# Patient Record
Sex: FEMALE | Race: WHITE | Employment: OTHER | URBAN - METROPOLITAN AREA
[De-identification: names, ages, dates, MRNs, and addresses within clinical notes are randomized per-mention and may not be internally consistent; named-entity substitution may affect disease eponyms.]

---

## 2017-02-10 ENCOUNTER — TELEPHONE (OUTPATIENT)
Dept: INTERNAL MEDICINE | Age: 62
End: 2017-02-10

## 2017-02-10 RX ORDER — CYANOCOBALAMIN 1000 UG/ML
INJECTION INTRAMUSCULAR; SUBCUTANEOUS
Qty: 10 ML | Refills: 0 | Status: SHIPPED | OUTPATIENT
Start: 2017-02-10 | End: 2017-12-02 | Stop reason: SDUPTHER

## 2017-02-21 ENCOUNTER — TELEPHONE (OUTPATIENT)
Dept: INTERNAL MEDICINE | Age: 62
End: 2017-02-21

## 2017-02-22 ENCOUNTER — OFFICE VISIT (OUTPATIENT)
Dept: INTERNAL MEDICINE | Age: 62
End: 2017-02-22

## 2017-02-22 VITALS
DIASTOLIC BLOOD PRESSURE: 82 MMHG | SYSTOLIC BLOOD PRESSURE: 138 MMHG | HEIGHT: 62 IN | BODY MASS INDEX: 25.21 KG/M2 | WEIGHT: 137 LBS

## 2017-02-22 DIAGNOSIS — T14.8XXA PULLED MUSCLE: Primary | ICD-10-CM

## 2017-02-22 DIAGNOSIS — E53.8 B12 DEFICIENCY: ICD-10-CM

## 2017-02-22 DIAGNOSIS — Z23 NEED FOR SHINGLES VACCINE: ICD-10-CM

## 2017-02-22 DIAGNOSIS — Z90.81 H/O SPLENECTOMY: ICD-10-CM

## 2017-02-22 PROCEDURE — 90736 HZV VACCINE LIVE SUBQ: CPT | Performed by: INTERNAL MEDICINE

## 2017-02-22 PROCEDURE — 90471 IMMUNIZATION ADMIN: CPT | Performed by: INTERNAL MEDICINE

## 2017-02-22 PROCEDURE — 99213 OFFICE O/P EST LOW 20 MIN: CPT | Performed by: INTERNAL MEDICINE

## 2017-02-22 RX ORDER — DICLOFENAC SODIUM 75 MG/1
75 TABLET, DELAYED RELEASE ORAL 2 TIMES DAILY
Qty: 30 TABLET | Refills: 1 | Status: SHIPPED | OUTPATIENT
Start: 2017-02-22 | End: 2017-05-26 | Stop reason: CLARIF

## 2017-05-17 ENCOUNTER — TELEPHONE (OUTPATIENT)
Dept: INTERNAL MEDICINE | Age: 62
End: 2017-05-17

## 2017-05-17 DIAGNOSIS — E53.8 B12 DEFICIENCY: ICD-10-CM

## 2017-05-17 DIAGNOSIS — Z00.00 WELL ADULT ON ROUTINE HEALTH CHECK: Primary | ICD-10-CM

## 2017-05-17 DIAGNOSIS — E55.9 VITAMIN D DEFICIENCY: ICD-10-CM

## 2017-05-26 ENCOUNTER — HOSPITAL ENCOUNTER (OUTPATIENT)
Dept: OTHER | Age: 62
Discharge: HOME | End: 2017-05-26
Attending: INTERNAL MEDICINE | Admitting: INTERNAL MEDICINE

## 2017-05-26 ENCOUNTER — OFFICE VISIT (OUTPATIENT)
Dept: INTERNAL MEDICINE | Age: 62
End: 2017-05-26

## 2017-05-26 VITALS
HEIGHT: 62 IN | DIASTOLIC BLOOD PRESSURE: 80 MMHG | BODY MASS INDEX: 24.29 KG/M2 | WEIGHT: 132 LBS | SYSTOLIC BLOOD PRESSURE: 110 MMHG

## 2017-05-26 DIAGNOSIS — R63.4 WEIGHT LOSS: ICD-10-CM

## 2017-05-26 DIAGNOSIS — Z00.00 WELL ADULT EXAM: ICD-10-CM

## 2017-05-26 DIAGNOSIS — F17.200 SMOKING: ICD-10-CM

## 2017-05-26 DIAGNOSIS — E53.8 B12 DEFICIENCY: ICD-10-CM

## 2017-05-26 DIAGNOSIS — Z90.81 H/O SPLENECTOMY: ICD-10-CM

## 2017-05-26 DIAGNOSIS — I10 ESSENTIAL HYPERTENSION: ICD-10-CM

## 2017-05-26 DIAGNOSIS — Z00.00 WELL ADULT EXAM: Primary | ICD-10-CM

## 2017-05-26 DIAGNOSIS — E55.9 VITAMIN D DEFICIENCY: ICD-10-CM

## 2017-05-26 LAB
A/G RATIO: 1.2 (ref 1.1–2.2)
ALBUMIN SERPL-MCNC: 4.2 G/DL (ref 3.4–5)
ALP BLD-CCNC: 163 U/L (ref 40–129)
ALT SERPL-CCNC: 9 U/L (ref 10–40)
ANION GAP SERPL CALCULATED.3IONS-SCNC: 15 MMOL/L (ref 3–16)
AST SERPL-CCNC: 16 U/L (ref 15–37)
BASOPHILS ABSOLUTE: 0.1 K/UL (ref 0–0.2)
BASOPHILS RELATIVE PERCENT: 0.9 %
BILIRUB SERPL-MCNC: 0.4 MG/DL (ref 0–1)
BILIRUBIN URINE: NEGATIVE
BLOOD, URINE: NEGATIVE
BUN BLDV-MCNC: 14 MG/DL (ref 7–20)
CALCIUM SERPL-MCNC: 9.7 MG/DL (ref 8.3–10.6)
CHLORIDE BLD-SCNC: 102 MMOL/L (ref 99–110)
CHOLESTEROL, TOTAL: 190 MG/DL (ref 0–199)
CLARITY: CLEAR
CO2: 25 MMOL/L (ref 21–32)
COLOR: YELLOW
CREAT SERPL-MCNC: 0.8 MG/DL (ref 0.6–1.2)
EOSINOPHILS ABSOLUTE: 0.3 K/UL (ref 0–0.6)
EOSINOPHILS RELATIVE PERCENT: 2.2 %
EPITHELIAL CELLS, UA: 2 /HPF (ref 0–5)
GFR AFRICAN AMERICAN: >60
GFR NON-AFRICAN AMERICAN: >60
GLOBULIN: 3.5 G/DL
GLUCOSE BLD-MCNC: 97 MG/DL (ref 70–99)
GLUCOSE URINE: NEGATIVE MG/DL
HCT VFR BLD CALC: 39.6 % (ref 36–48)
HDLC SERPL-MCNC: 45 MG/DL (ref 40–60)
HEMOGLOBIN: 12.4 G/DL (ref 12–16)
HYALINE CASTS: 2 /LPF (ref 0–8)
KETONES, URINE: NEGATIVE MG/DL
LDL CHOLESTEROL CALCULATED: 118 MG/DL
LEUKOCYTE ESTERASE, URINE: ABNORMAL
LYMPHOCYTES ABSOLUTE: 4.2 K/UL (ref 1–5.1)
LYMPHOCYTES RELATIVE PERCENT: 34.9 %
MCH RBC QN AUTO: 25.3 PG (ref 26–34)
MCHC RBC AUTO-ENTMCNC: 31.2 G/DL (ref 31–36)
MCV RBC AUTO: 81 FL (ref 80–100)
MICROSCOPIC EXAMINATION: YES
MONOCYTES ABSOLUTE: 1.1 K/UL (ref 0–1.3)
MONOCYTES RELATIVE PERCENT: 8.8 %
NEUTROPHILS ABSOLUTE: 6.4 K/UL (ref 1.7–7.7)
NEUTROPHILS RELATIVE PERCENT: 53.2 %
NITRITE, URINE: NEGATIVE
PDW BLD-RTO: 16.4 % (ref 12.4–15.4)
PH UA: 6
PLATELET # BLD: 381 K/UL (ref 135–450)
PMV BLD AUTO: 9.6 FL (ref 5–10.5)
POTASSIUM SERPL-SCNC: 4.5 MMOL/L (ref 3.5–5.1)
PROTEIN UA: NEGATIVE MG/DL
RBC # BLD: 4.89 M/UL (ref 4–5.2)
RBC UA: 2 /HPF (ref 0–4)
SODIUM BLD-SCNC: 142 MMOL/L (ref 136–145)
SPECIFIC GRAVITY UA: 1.01
TOTAL PROTEIN: 7.7 G/DL (ref 6.4–8.2)
TRIGL SERPL-MCNC: 133 MG/DL (ref 0–150)
URINE TYPE: ABNORMAL
UROBILINOGEN, URINE: 0.2 E.U./DL
VLDLC SERPL CALC-MCNC: 27 MG/DL
WBC # BLD: 12 K/UL (ref 4–11)
WBC UA: 3 /HPF (ref 0–5)

## 2017-05-26 PROCEDURE — 99396 PREV VISIT EST AGE 40-64: CPT | Performed by: INTERNAL MEDICINE

## 2017-05-26 PROCEDURE — 93000 ELECTROCARDIOGRAM COMPLETE: CPT | Performed by: INTERNAL MEDICINE

## 2017-05-26 ASSESSMENT — PATIENT HEALTH QUESTIONNAIRE - PHQ9
2. FEELING DOWN, DEPRESSED OR HOPELESS: 0
SUM OF ALL RESPONSES TO PHQ9 QUESTIONS 1 & 2: 0
1. LITTLE INTEREST OR PLEASURE IN DOING THINGS: 0
SUM OF ALL RESPONSES TO PHQ QUESTIONS 1-9: 0

## 2017-05-27 LAB
TSH SERPL DL<=0.05 MIU/L-ACNC: 1.59 UIU/ML (ref 0.27–4.2)
VITAMIN B-12: 1265 PG/ML (ref 211–911)
VITAMIN D 25-HYDROXY: 36.8 NG/ML

## 2017-12-04 ENCOUNTER — TELEPHONE (OUTPATIENT)
Dept: INTERNAL MEDICINE | Age: 62
End: 2017-12-04

## 2017-12-04 RX ORDER — CYANOCOBALAMIN 1000 UG/ML
INJECTION INTRAMUSCULAR; SUBCUTANEOUS
Qty: 3 ML | Refills: 5 | Status: SHIPPED | OUTPATIENT
Start: 2017-12-04 | End: 2018-11-17 | Stop reason: SDUPTHER

## 2017-12-04 RX ORDER — AMOXICILLIN AND CLAVULANATE POTASSIUM 875; 125 MG/1; MG/1
1 TABLET, FILM COATED ORAL 2 TIMES DAILY
Qty: 14 TABLET | Refills: 0 | Status: SHIPPED | OUTPATIENT
Start: 2017-12-04 | End: 2017-12-11

## 2017-12-13 ENCOUNTER — TELEPHONE (OUTPATIENT)
Dept: INTERNAL MEDICINE | Age: 62
End: 2017-12-13

## 2018-01-22 ENCOUNTER — OFFICE VISIT (OUTPATIENT)
Dept: INTERNAL MEDICINE | Age: 63
End: 2018-01-22

## 2018-01-22 VITALS
SYSTOLIC BLOOD PRESSURE: 140 MMHG | DIASTOLIC BLOOD PRESSURE: 98 MMHG | WEIGHT: 132 LBS | HEIGHT: 62 IN | BODY MASS INDEX: 24.29 KG/M2

## 2018-01-22 DIAGNOSIS — F41.0 PANIC ATTACK: Primary | ICD-10-CM

## 2018-01-22 LAB
A/G RATIO: 1.3 (ref 1.1–2.2)
ALBUMIN SERPL-MCNC: 4.2 G/DL (ref 3.4–5)
ALP BLD-CCNC: 144 U/L (ref 40–129)
ALT SERPL-CCNC: 10 U/L (ref 10–40)
ANION GAP SERPL CALCULATED.3IONS-SCNC: 14 MMOL/L (ref 3–16)
AST SERPL-CCNC: 15 U/L (ref 15–37)
BASOPHILS ABSOLUTE: 0.1 K/UL (ref 0–0.2)
BASOPHILS RELATIVE PERCENT: 1.2 %
BILIRUB SERPL-MCNC: <0.2 MG/DL (ref 0–1)
BUN BLDV-MCNC: 14 MG/DL (ref 7–20)
CALCIUM SERPL-MCNC: 9.7 MG/DL (ref 8.3–10.6)
CHLORIDE BLD-SCNC: 100 MMOL/L (ref 99–110)
CO2: 27 MMOL/L (ref 21–32)
CREAT SERPL-MCNC: 0.7 MG/DL (ref 0.6–1.2)
EOSINOPHILS ABSOLUTE: 0.4 K/UL (ref 0–0.6)
EOSINOPHILS RELATIVE PERCENT: 3.1 %
GFR AFRICAN AMERICAN: >60
GFR NON-AFRICAN AMERICAN: >60
GLOBULIN: 3.3 G/DL
GLUCOSE BLD-MCNC: 94 MG/DL (ref 70–99)
HCT VFR BLD CALC: 39.5 % (ref 36–48)
HEMOGLOBIN: 12.5 G/DL (ref 12–16)
LYMPHOCYTES ABSOLUTE: 4.9 K/UL (ref 1–5.1)
LYMPHOCYTES RELATIVE PERCENT: 39 %
MCH RBC QN AUTO: 25.4 PG (ref 26–34)
MCHC RBC AUTO-ENTMCNC: 31.7 G/DL (ref 31–36)
MCV RBC AUTO: 80.2 FL (ref 80–100)
MONOCYTES ABSOLUTE: 1.1 K/UL (ref 0–1.3)
MONOCYTES RELATIVE PERCENT: 8.7 %
NEUTROPHILS ABSOLUTE: 6 K/UL (ref 1.7–7.7)
NEUTROPHILS RELATIVE PERCENT: 48 %
PDW BLD-RTO: 15.9 % (ref 12.4–15.4)
PLATELET # BLD: 406 K/UL (ref 135–450)
PMV BLD AUTO: 9.4 FL (ref 5–10.5)
POTASSIUM SERPL-SCNC: 4.7 MMOL/L (ref 3.5–5.1)
RBC # BLD: 4.93 M/UL (ref 4–5.2)
SEDIMENTATION RATE, ERYTHROCYTE: 21 MM/HR (ref 0–30)
SODIUM BLD-SCNC: 141 MMOL/L (ref 136–145)
TOTAL PROTEIN: 7.5 G/DL (ref 6.4–8.2)
TSH SERPL DL<=0.05 MIU/L-ACNC: 1.73 UIU/ML (ref 0.27–4.2)
WBC # BLD: 12.5 K/UL (ref 4–11)

## 2018-01-22 PROCEDURE — 99213 OFFICE O/P EST LOW 20 MIN: CPT | Performed by: INTERNAL MEDICINE

## 2018-01-22 RX ORDER — ALPRAZOLAM 0.5 MG/1
0.5 TABLET ORAL 3 TIMES DAILY PRN
Qty: 20 TABLET | Refills: 0 | Status: SHIPPED | OUTPATIENT
Start: 2018-01-22 | End: 2018-01-29

## 2018-01-22 ASSESSMENT — ENCOUNTER SYMPTOMS
COUGH: 0
HEARTBURN: 0
DIARRHEA: 0
ABDOMINAL PAIN: 0
VOMITING: 0
SPUTUM PRODUCTION: 0
CONSTIPATION: 0
NAUSEA: 0

## 2018-01-22 NOTE — PROGRESS NOTES
Follow Up Visit  Established Patient Visit    Patient:  Corrie Libman                                               : 1955  Age: 63 y.o. MRN: [id number]  Date : 2018      CHIEF COMPLAINT: Corrie Libman is a 63 y.o. female who presents for : panic attacks    Chief Complaint   Patient presents with    Panic Attack     Patient is a 62YOF with hx of tobacco abuse, splenectomy and HTN who presents with panic attacks. Pt states that these episodes began 2-3 months ago and are increasing in frequency, occurring once a week. Symptoms include R eye blurriness of vision, R sided head pressure, chest palpitations, not being able to move, and feeling of fear. Episodes last for a few minutes and tend to happen when she is in the car or about to go on a highway. She denies any new psychosocial stressors, changes in medication, or changes in caffeine intake. She denies any difficulty sleeping, depression, or anxiety outside of panic attacks. She currently smokes <1/2 PPD. She has had 20 lbs of weight loss since 2016, which the patient attributes to eating more fruits and vegetables and staying active. Pt states that she had similar panic attacks 15-20 years ago and took Xanax for many years. Review of Systems   Constitutional: Negative for chills, fever and malaise/fatigue. HENT: Negative. Respiratory: Negative for cough and sputum production. Cardiovascular: Negative for chest pain, palpitations and leg swelling. Gastrointestinal: Negative for abdominal pain, constipation, diarrhea, heartburn, nausea and vomiting. Genitourinary: Negative. Musculoskeletal: Negative. Skin: Negative. Neurological: Negative for dizziness, tremors, seizures, loss of consciousness and headaches. Psychiatric/Behavioral: Negative for depression, memory loss and suicidal ideas. The patient is not nervous/anxious and does not have insomnia.         Past Medical History:   Diagnosis Date    Asthma     Breast cyst 4/11/2014    H/O splenectomy 8/27/2012    Lumbar disc disease 4/11/2014    Other B-complex deficiencies     Pneumonia 11/15/2014    Thoracic or lumbosacral neuritis or radiculitis, unspecified     Vitamin D deficiency 4/28/2015       Past Surgical History:   Procedure Laterality Date    SPLENECTOMY      H/O    TYMPANOSTOMY TUBE PLACEMENT         Current Outpatient Prescriptions   Medication Sig Dispense Refill    ALPRAZolam (XANAX) 0.5 MG tablet Take 1 tablet by mouth 3 times daily as needed for Sleep or Anxiety for up to 7 days. 20 tablet 0    cyanocobalamin 1000 MCG/ML injection INJECT 1 ML INTO THE MUSCLE EVERY 30 DAYS 3 mL 5    aspirin 81 MG chewable tablet Take 1 tablet by mouth daily. 30 tablet 5    albuterol (PROVENTIL HFA) 108 (90 BASE) MCG/ACT inhaler Inhale 2 puffs into the lungs every 6 hours as needed for Wheezing. 1 Inhaler 3    Cholecalciferol (VITAMIN D) 2000 UNITS CAPS capsule Take 1 capsule by mouth daily. 30 capsule 0     No current facility-administered medications for this visit. Physical Exam   BP (!) 140/98   Ht 5' 2\" (1.575 m)   Wt 132 lb (59.9 kg)   BMI 24.14 kg/m²     Physical Exam   Constitutional: She is oriented to person, place, and time. She appears well-developed and well-nourished. No distress. HENT:   Head: Normocephalic and atraumatic. Eyes: Pupils are equal, round, and reactive to light. Neck: Normal range of motion. No thyromegaly present. Cardiovascular: Normal rate and regular rhythm. Exam reveals no gallop and no friction rub. No murmur heard. Pulmonary/Chest: Effort normal and breath sounds normal. No respiratory distress. She has no wheezes. Neurological: She is alert and oriented to person, place, and time. Skin: Skin is warm and dry. She is not diaphoretic. Psychiatric: She has a normal mood and affect. Her behavior is normal.       Assessment/ plan:     Mingo Fabry was seen today for panic attack.     Diagnoses and all orders

## 2018-08-24 ENCOUNTER — OFFICE VISIT (OUTPATIENT)
Dept: INTERNAL MEDICINE | Age: 63
End: 2018-08-24

## 2018-08-24 VITALS
WEIGHT: 130 LBS | SYSTOLIC BLOOD PRESSURE: 138 MMHG | BODY MASS INDEX: 23.92 KG/M2 | HEIGHT: 62 IN | DIASTOLIC BLOOD PRESSURE: 78 MMHG

## 2018-08-24 DIAGNOSIS — I10 ESSENTIAL HYPERTENSION: ICD-10-CM

## 2018-08-24 DIAGNOSIS — E53.8 B12 DEFICIENCY: ICD-10-CM

## 2018-08-24 DIAGNOSIS — Z00.00 PHYSICAL EXAM: Primary | ICD-10-CM

## 2018-08-24 DIAGNOSIS — M79.671 RIGHT FOOT PAIN: ICD-10-CM

## 2018-08-24 DIAGNOSIS — E55.9 VITAMIN D DEFICIENCY: ICD-10-CM

## 2018-08-24 DIAGNOSIS — Z90.81 H/O SPLENECTOMY: ICD-10-CM

## 2018-08-24 DIAGNOSIS — Z23 NEED FOR SHINGLES VACCINE: ICD-10-CM

## 2018-08-24 DIAGNOSIS — S93.401D SPRAIN OF RIGHT ANKLE, UNSPECIFIED LIGAMENT, SUBSEQUENT ENCOUNTER: ICD-10-CM

## 2018-08-24 PROCEDURE — 90750 HZV VACC RECOMBINANT IM: CPT | Performed by: INTERNAL MEDICINE

## 2018-08-24 PROCEDURE — 93000 ELECTROCARDIOGRAM COMPLETE: CPT | Performed by: INTERNAL MEDICINE

## 2018-08-24 PROCEDURE — 90471 IMMUNIZATION ADMIN: CPT | Performed by: INTERNAL MEDICINE

## 2018-08-24 PROCEDURE — 99396 PREV VISIT EST AGE 40-64: CPT | Performed by: INTERNAL MEDICINE

## 2018-08-24 RX ORDER — DICLOFENAC SODIUM 75 MG/1
75 TABLET, DELAYED RELEASE ORAL 2 TIMES DAILY
Qty: 30 TABLET | Refills: 1 | Status: SHIPPED | OUTPATIENT
Start: 2018-08-24 | End: 2019-03-26 | Stop reason: CLARIF

## 2018-08-24 ASSESSMENT — PATIENT HEALTH QUESTIONNAIRE - PHQ9
1. LITTLE INTEREST OR PLEASURE IN DOING THINGS: 0
SUM OF ALL RESPONSES TO PHQ QUESTIONS 1-9: 0
SUM OF ALL RESPONSES TO PHQ9 QUESTIONS 1 & 2: 0
SUM OF ALL RESPONSES TO PHQ QUESTIONS 1-9: 0
2. FEELING DOWN, DEPRESSED OR HOPELESS: 0

## 2018-08-24 NOTE — PROGRESS NOTES
Annual Wellness Visit     Patient:  Logan Sandoval                                               : 1955  Age: 66 y.o. MRN: [id number]  Date : 2018      CHIEF COMPLAINT: Logan Sandoval is a 66 y.o. female who presents for : Physical exam    1. Physical exam  Lis Trimble feels okay denies any chest pain shortness of breath or any other problems her major problem is that her right ankle and right foot are pending painful for 3 weeks after injuring it she went to a urgent care where x-ray was negative for fracture  - EKG 12 Lead  - diclofenac (VOLTAREN) 75 MG EC tablet; Take 1 tablet by mouth 2 times daily  Dispense: 30 tablet; Refill: 1  - CBC Auto Differential; Future  - Comprehensive Metabolic Panel; Future  - Lipid Panel; Future  - TSH without Reflex; Future  - Urinalysis; Future    2. H/O splenectomy  This problem is stable she's up-to-date on all her immunizations    3. B12 deficiency  No complaints  - VITAMIN B12; Future    4. Vitamin D deficiency    - VITAMIN D 25 HYDROXY; Future    5. Essential hypertension  No complaints blood pressures been controlled    6. Right foot pain  As above  - Aircast Air-Stirrup Ankle Splint    7.  Sprain of right ankle, unspecified ligament, subsequent encounter  As above  - Aircast Air-Stirrup Ankle Splint        Patient Active Problem List    Diagnosis Date Noted    Vitamin D deficiency 2015    Essential hypertension 2014    Lumbar disc disease 2014    Breast cyst 2014    H/O splenectomy 2012    B12 deficiency 2012    Vitamin D deficiency 2010       Constitutional:  Denies fever or chills   Eyes:  Denies change in visual acuity   HENT:  Denies nasal congestion or sore throat   Respiratory:  Denies cough or shortness of breath   Cardiovascular:  Denies chest pain or edema   GI:  Denies abdominal pain, nausea, vomiting, bloody stools or diarrhea   :  Denies dysuria   Musculoskeletal:  Denies back pain or joint pain Results   Component Value Date    ALT 10 01/22/2018    AST 15 01/22/2018    GGT 23 06/01/2012    ALKPHOS 144 (H) 01/22/2018    BILITOT <0.2 01/22/2018       Lipids:   Lab Results   Component Value Date    CHOL 190 05/26/2017    HDL 45 05/26/2017    LDLCALC 118 (H) 05/26/2017    TRIG 133 05/26/2017       INR: No results found for: INR, PROTIME    U/A:  Lab Results   Component Value Date    LABMICR YES 05/26/2017        No results found for: LABA1C     Lab Results   Component Value Date    CREATININE 0.7 01/22/2018       -----------------------------------------------------------------     Assessment/Plan:   1. Physical exam  Check screening labs shingles vaccine given she is also get a flu shot continue diet and exercise  - EKG 12 Lead  - diclofenac (VOLTAREN) 75 MG EC tablet; Take 1 tablet by mouth 2 times daily  Dispense: 30 tablet; Refill: 1  - CBC Auto Differential; Future  - Comprehensive Metabolic Panel; Future  - Lipid Panel; Future  - TSH without Reflex; Future  - Urinalysis; Future    2. H/O splenectomy  Problem is stable continue to monitor    3. B12 deficiency  Check above labs  - VITAMIN B12; Future    4. Vitamin D deficiency  Check above labs  - VITAMIN D 25 HYDROXY; Future    5. Essential hypertension  This problem is stable continue present meds    6. Right foot pain  Probably secondary to sprained ankle will get Aircast and Voltaren if this second better may need a orthopedic consultation  - Aircast Air-Stirrup Ankle Splint    7.  Sprain of right ankle, unspecified ligament, subsequent encounter  As above  - Aircast Air-Stirrup Ankle Splint

## 2018-11-17 ENCOUNTER — OFFICE VISIT (OUTPATIENT)
Dept: INTERNAL MEDICINE CLINIC | Age: 63
End: 2018-11-17
Payer: COMMERCIAL

## 2018-11-17 VITALS
SYSTOLIC BLOOD PRESSURE: 118 MMHG | WEIGHT: 133 LBS | DIASTOLIC BLOOD PRESSURE: 80 MMHG | HEIGHT: 62 IN | BODY MASS INDEX: 24.48 KG/M2

## 2018-11-17 DIAGNOSIS — E53.8 B12 DEFICIENCY: ICD-10-CM

## 2018-11-17 DIAGNOSIS — I10 ESSENTIAL HYPERTENSION: ICD-10-CM

## 2018-11-17 DIAGNOSIS — E55.9 VITAMIN D DEFICIENCY: ICD-10-CM

## 2018-11-17 DIAGNOSIS — H40.052 INCREASED PRESSURE IN THE EYE, LEFT: ICD-10-CM

## 2018-11-17 DIAGNOSIS — F17.200 SMOKER: ICD-10-CM

## 2018-11-17 DIAGNOSIS — G44.89 OTHER HEADACHE SYNDROME: ICD-10-CM

## 2018-11-17 DIAGNOSIS — R51.9 INCREASED SEVERITY OF HEADACHES: Primary | ICD-10-CM

## 2018-11-17 PROCEDURE — 99214 OFFICE O/P EST MOD 30 MIN: CPT | Performed by: INTERNAL MEDICINE

## 2018-11-17 RX ORDER — CYANOCOBALAMIN 1000 UG/ML
INJECTION INTRAMUSCULAR; SUBCUTANEOUS
Qty: 3 ML | Refills: 5 | Status: SHIPPED | OUTPATIENT
Start: 2018-11-17 | End: 2020-02-24 | Stop reason: SDUPTHER

## 2018-11-17 ASSESSMENT — ENCOUNTER SYMPTOMS
WHEEZING: 0
CHEST TIGHTNESS: 0
SHORTNESS OF BREATH: 0
BACK PAIN: 0
COLOR CHANGE: 0
EYE PAIN: 1
COUGH: 0
ABDOMINAL PAIN: 0

## 2018-11-17 ASSESSMENT — PATIENT HEALTH QUESTIONNAIRE - PHQ9
1. LITTLE INTEREST OR PLEASURE IN DOING THINGS: 0
2. FEELING DOWN, DEPRESSED OR HOPELESS: 0
SUM OF ALL RESPONSES TO PHQ QUESTIONS 1-9: 0
SUM OF ALL RESPONSES TO PHQ QUESTIONS 1-9: 0
SUM OF ALL RESPONSES TO PHQ9 QUESTIONS 1 & 2: 0

## 2018-11-21 ENCOUNTER — TELEPHONE (OUTPATIENT)
Dept: INTERNAL MEDICINE CLINIC | Age: 63
End: 2018-11-21

## 2018-11-21 DIAGNOSIS — G44.89 OTHER HEADACHE SYNDROME: ICD-10-CM

## 2018-11-21 DIAGNOSIS — G89.29 CHRONIC INTRACTABLE HEADACHE, UNSPECIFIED HEADACHE TYPE: ICD-10-CM

## 2018-11-21 DIAGNOSIS — H40.052 INCREASED PRESSURE IN THE EYE, LEFT: Primary | ICD-10-CM

## 2018-11-21 DIAGNOSIS — R51.9 CHRONIC INTRACTABLE HEADACHE, UNSPECIFIED HEADACHE TYPE: ICD-10-CM

## 2018-11-30 ENCOUNTER — HOSPITAL ENCOUNTER (OUTPATIENT)
Dept: MRI IMAGING | Age: 63
Discharge: HOME | End: 2018-11-30
Payer: COMMERCIAL

## 2018-11-30 DIAGNOSIS — G89.29 CHRONIC INTRACTABLE HEADACHE, UNSPECIFIED HEADACHE TYPE: ICD-10-CM

## 2018-11-30 DIAGNOSIS — G44.89 OTHER HEADACHE SYNDROME: ICD-10-CM

## 2018-11-30 DIAGNOSIS — R51.9 CHRONIC INTRACTABLE HEADACHE, UNSPECIFIED HEADACHE TYPE: ICD-10-CM

## 2018-11-30 DIAGNOSIS — H40.052 INCREASED PRESSURE IN THE EYE, LEFT: ICD-10-CM

## 2018-11-30 PROCEDURE — 70551 MRI BRAIN STEM W/O DYE: CPT

## 2018-12-04 ENCOUNTER — TELEPHONE (OUTPATIENT)
Dept: INTERNAL MEDICINE CLINIC | Age: 63
End: 2018-12-04

## 2018-12-04 DIAGNOSIS — R90.89 ABNORMAL FINDING ON MRI OF BRAIN: Primary | ICD-10-CM

## 2018-12-04 DIAGNOSIS — H40.051 INCREASED PRESSURE IN THE EYE, RIGHT: ICD-10-CM

## 2018-12-04 PROBLEM — H40.052 INCREASED PRESSURE IN THE EYE, LEFT: Status: RESOLVED | Noted: 2018-11-17 | Resolved: 2018-12-04

## 2018-12-04 NOTE — TELEPHONE ENCOUNTER
Discussed w pt-pain is increasing- needs to get in to see Dr. Fauzia Tariq or one of his colleagues asap and I need to get a message to him or his PA that pt has swelling of the optic nerve sheaths on MRI w empty sella and new onset of pain behind right eye which is escalating and some visual defects  Do they want us to give her some medrol?????   Viki let me know

## 2018-12-04 NOTE — TELEPHONE ENCOUNTER
Call placed to [address] [place] [address], registered patient. Faxed information. [phone] fax #    Urgent message will be sent to [address] [place] [address] for him to decide where to put patient and also I gave 's cell number to his office to give to him to decide if patient should be started on Medrol.

## 2018-12-05 ENCOUNTER — TELEPHONE (OUTPATIENT)
Dept: INTERNAL MEDICINE CLINIC | Age: 63
End: 2018-12-05

## 2018-12-06 RX ORDER — ACETAZOLAMIDE 250 MG/1
500 TABLET ORAL 2 TIMES DAILY
Qty: 40 TABLET | Refills: 0 | Status: SHIPPED | OUTPATIENT
Start: 2018-12-06 | End: 2019-03-26 | Stop reason: CLARIF

## 2019-01-08 DIAGNOSIS — E53.8 B12 DEFICIENCY: ICD-10-CM

## 2019-01-08 DIAGNOSIS — E55.9 VITAMIN D DEFICIENCY: ICD-10-CM

## 2019-01-08 DIAGNOSIS — Z00.00 PHYSICAL EXAM: ICD-10-CM

## 2019-01-08 DIAGNOSIS — R74.9 ABNORMAL SERUM ENZYME LEVEL: Primary | ICD-10-CM

## 2019-01-08 LAB
A/G RATIO: 1.5 (ref 1.1–2.2)
ALBUMIN SERPL-MCNC: 4.4 G/DL (ref 3.4–5)
ALP BLD-CCNC: 212 U/L (ref 40–129)
ALT SERPL-CCNC: 10 U/L (ref 10–40)
ANION GAP SERPL CALCULATED.3IONS-SCNC: 12 MMOL/L (ref 3–16)
AST SERPL-CCNC: 15 U/L (ref 15–37)
BASOPHILS ABSOLUTE: 0.1 K/UL (ref 0–0.2)
BASOPHILS RELATIVE PERCENT: 0.8 %
BILIRUB SERPL-MCNC: 0.3 MG/DL (ref 0–1)
BILIRUBIN URINE: ABNORMAL
BLOOD, URINE: NEGATIVE
BUN BLDV-MCNC: 12 MG/DL (ref 7–20)
CALCIUM SERPL-MCNC: 9.6 MG/DL (ref 8.3–10.6)
CHLORIDE BLD-SCNC: 104 MMOL/L (ref 99–110)
CHOLESTEROL, TOTAL: 195 MG/DL (ref 0–199)
CLARITY: ABNORMAL
CO2: 27 MMOL/L (ref 21–32)
COLOR: ABNORMAL
COMMENT UA: ABNORMAL
CREAT SERPL-MCNC: 0.7 MG/DL (ref 0.6–1.2)
CRYSTALS, UA: ABNORMAL
EOSINOPHILS ABSOLUTE: 0.6 K/UL (ref 0–0.6)
EOSINOPHILS RELATIVE PERCENT: 6.2 %
EPITHELIAL CELLS, UA: 15 /HPF (ref 0–5)
GFR AFRICAN AMERICAN: >60
GFR NON-AFRICAN AMERICAN: >60
GLOBULIN: 2.9 G/DL
GLUCOSE BLD-MCNC: 105 MG/DL (ref 70–99)
GLUCOSE URINE: NEGATIVE MG/DL
HCT VFR BLD CALC: 38.8 % (ref 36–48)
HDLC SERPL-MCNC: 47 MG/DL (ref 40–60)
HEMOGLOBIN: 12.5 G/DL (ref 12–16)
HYALINE CASTS: 9 /LPF (ref 0–8)
KETONES, URINE: NEGATIVE MG/DL
LDL CHOLESTEROL CALCULATED: 135 MG/DL
LEUKOCYTE ESTERASE, URINE: NEGATIVE
LYMPHOCYTES ABSOLUTE: 3.8 K/UL (ref 1–5.1)
LYMPHOCYTES RELATIVE PERCENT: 36.7 %
MCH RBC QN AUTO: 25.8 PG (ref 26–34)
MCHC RBC AUTO-ENTMCNC: 32.1 G/DL (ref 31–36)
MCV RBC AUTO: 80.1 FL (ref 80–100)
MICROSCOPIC EXAMINATION: YES
MONOCYTES ABSOLUTE: 1 K/UL (ref 0–1.3)
MONOCYTES RELATIVE PERCENT: 9.3 %
NEUTROPHILS ABSOLUTE: 4.8 K/UL (ref 1.7–7.7)
NEUTROPHILS RELATIVE PERCENT: 47 %
NITRITE, URINE: NEGATIVE
PDW BLD-RTO: 16.7 % (ref 12.4–15.4)
PH UA: 5.5
PLATELET # BLD: 360 K/UL (ref 135–450)
PMV BLD AUTO: 9.4 FL (ref 5–10.5)
POTASSIUM SERPL-SCNC: 4.6 MMOL/L (ref 3.5–5.1)
PROTEIN UA: NEGATIVE MG/DL
RBC # BLD: 4.85 M/UL (ref 4–5.2)
SODIUM BLD-SCNC: 143 MMOL/L (ref 136–145)
SPECIFIC GRAVITY UA: 1.02
TOTAL PROTEIN: 7.3 G/DL (ref 6.4–8.2)
TRIGL SERPL-MCNC: 64 MG/DL (ref 0–150)
TSH SERPL DL<=0.05 MIU/L-ACNC: 2.92 UIU/ML (ref 0.27–4.2)
URINE TYPE: ABNORMAL
UROBILINOGEN, URINE: 1 E.U./DL
VITAMIN B-12: 972 PG/ML (ref 211–911)
VITAMIN D 25-HYDROXY: 29.3 NG/ML
VLDLC SERPL CALC-MCNC: 13 MG/DL
WBC # BLD: 10.2 K/UL (ref 4–11)
WBC UA: 11 /HPF (ref 0–5)

## 2019-01-18 ENCOUNTER — TELEPHONE (OUTPATIENT)
Dept: INTERNAL MEDICINE CLINIC | Age: 64
End: 2019-01-18

## 2019-03-19 ENCOUNTER — OFFICE VISIT (OUTPATIENT)
Dept: INTERNAL MEDICINE CLINIC | Age: 64
End: 2019-03-19
Payer: COMMERCIAL

## 2019-03-19 VITALS
HEART RATE: 92 BPM | BODY MASS INDEX: 25.42 KG/M2 | WEIGHT: 139 LBS | SYSTOLIC BLOOD PRESSURE: 126 MMHG | OXYGEN SATURATION: 97 % | DIASTOLIC BLOOD PRESSURE: 78 MMHG | TEMPERATURE: 99.7 F

## 2019-03-19 DIAGNOSIS — J10.1 INFLUENZA A: ICD-10-CM

## 2019-03-19 PROCEDURE — 99213 OFFICE O/P EST LOW 20 MIN: CPT | Performed by: NURSE PRACTITIONER

## 2019-03-19 RX ORDER — OSELTAMIVIR PHOSPHATE 75 MG/1
75 CAPSULE ORAL 2 TIMES DAILY
Qty: 10 CAPSULE | Refills: 0 | Status: SHIPPED | OUTPATIENT
Start: 2019-03-19 | End: 2019-03-24

## 2019-03-19 ASSESSMENT — PATIENT HEALTH QUESTIONNAIRE - PHQ9
SUM OF ALL RESPONSES TO PHQ QUESTIONS 1-9: 0
SUM OF ALL RESPONSES TO PHQ9 QUESTIONS 1 & 2: 0
2. FEELING DOWN, DEPRESSED OR HOPELESS: 0
1. LITTLE INTEREST OR PLEASURE IN DOING THINGS: 0
SUM OF ALL RESPONSES TO PHQ QUESTIONS 1-9: 0

## 2019-03-19 ASSESSMENT — ENCOUNTER SYMPTOMS
SHORTNESS OF BREATH: 0
NAUSEA: 0
DIARRHEA: 0
SINUS PAIN: 1
COUGH: 1
RHINORRHEA: 0
SINUS PRESSURE: 1
VOMITING: 0
WHEEZING: 0
SORE THROAT: 1

## 2019-03-20 ENCOUNTER — HOSPITAL ENCOUNTER (EMERGENCY)
Age: 64
Discharge: HOME | End: 2019-03-20
Attending: EMERGENCY MEDICINE
Payer: COMMERCIAL

## 2019-03-20 ENCOUNTER — APPOINTMENT (OUTPATIENT)
Dept: GENERAL RADIOLOGY | Age: 64
End: 2019-03-20
Payer: COMMERCIAL

## 2019-03-20 VITALS
HEART RATE: 70 BPM | OXYGEN SATURATION: 99 % | SYSTOLIC BLOOD PRESSURE: 121 MMHG | DIASTOLIC BLOOD PRESSURE: 64 MMHG | HEIGHT: 63 IN | TEMPERATURE: 100.2 F | WEIGHT: 135 LBS | RESPIRATION RATE: 18 BRPM | BODY MASS INDEX: 23.92 KG/M2

## 2019-03-20 DIAGNOSIS — J11.1 INFLUENZA: Primary | ICD-10-CM

## 2019-03-20 PROCEDURE — 71046 X-RAY EXAM CHEST 2 VIEWS: CPT

## 2019-03-20 PROCEDURE — 99283 EMERGENCY DEPT VISIT LOW MDM: CPT

## 2019-03-20 RX ORDER — GUAIFENESIN, PSEUDOEPHEDRINE HYDROCHLORIDE 600; 60 MG/1; MG/1
1 TABLET, EXTENDED RELEASE ORAL EVERY 12 HOURS PRN
Qty: 20 TABLET | Refills: 0 | Status: SHIPPED | OUTPATIENT
Start: 2019-03-20 | End: 2020-02-24

## 2019-03-20 ASSESSMENT — PAIN DESCRIPTION - FREQUENCY: FREQUENCY: INTERMITTENT

## 2019-03-20 ASSESSMENT — PAIN DESCRIPTION - DESCRIPTORS: DESCRIPTORS: ACHING

## 2019-03-20 ASSESSMENT — PAIN DESCRIPTION - PAIN TYPE: TYPE: ACUTE PAIN

## 2019-03-21 ASSESSMENT — ENCOUNTER SYMPTOMS
EYES NEGATIVE: 1
TROUBLE SWALLOWING: 0
COUGH: 1
GASTROINTESTINAL NEGATIVE: 1
SORE THROAT: 1

## 2019-03-26 ENCOUNTER — OFFICE VISIT (OUTPATIENT)
Dept: INTERNAL MEDICINE CLINIC | Age: 64
End: 2019-03-26
Payer: COMMERCIAL

## 2019-03-26 VITALS
DIASTOLIC BLOOD PRESSURE: 80 MMHG | SYSTOLIC BLOOD PRESSURE: 128 MMHG | BODY MASS INDEX: 24.27 KG/M2 | HEIGHT: 63 IN | WEIGHT: 137 LBS

## 2019-03-26 DIAGNOSIS — Z09 HOSPITAL DISCHARGE FOLLOW-UP: ICD-10-CM

## 2019-03-26 DIAGNOSIS — Z09 HOSPITAL DISCHARGE FOLLOW-UP: Primary | ICD-10-CM

## 2019-03-26 DIAGNOSIS — Z12.31 SCREENING MAMMOGRAM, ENCOUNTER FOR: ICD-10-CM

## 2019-03-26 DIAGNOSIS — J10.1 INFLUENZA A: ICD-10-CM

## 2019-03-26 LAB
A/G RATIO: 1 (ref 1.1–2.2)
ALBUMIN SERPL-MCNC: 3.8 G/DL (ref 3.4–5)
ALP BLD-CCNC: 167 U/L (ref 40–129)
ALT SERPL-CCNC: 11 U/L (ref 10–40)
ANION GAP SERPL CALCULATED.3IONS-SCNC: 12 MMOL/L (ref 3–16)
AST SERPL-CCNC: 18 U/L (ref 15–37)
BASOPHILS ABSOLUTE: 0.1 K/UL (ref 0–0.2)
BASOPHILS RELATIVE PERCENT: 0.6 %
BILIRUB SERPL-MCNC: 0.3 MG/DL (ref 0–1)
BUN BLDV-MCNC: 12 MG/DL (ref 7–20)
CALCIUM SERPL-MCNC: 9.6 MG/DL (ref 8.3–10.6)
CHLORIDE BLD-SCNC: 106 MMOL/L (ref 99–110)
CO2: 26 MMOL/L (ref 21–32)
CREAT SERPL-MCNC: 0.7 MG/DL (ref 0.6–1.2)
EOSINOPHILS ABSOLUTE: 0.3 K/UL (ref 0–0.6)
EOSINOPHILS RELATIVE PERCENT: 2.5 %
GFR AFRICAN AMERICAN: >60
GFR NON-AFRICAN AMERICAN: >60
GLOBULIN: 3.8 G/DL
GLUCOSE BLD-MCNC: 107 MG/DL (ref 70–99)
HCT VFR BLD CALC: 38.2 % (ref 36–48)
HEMOGLOBIN: 11.9 G/DL (ref 12–16)
LYMPHOCYTES ABSOLUTE: 4 K/UL (ref 1–5.1)
LYMPHOCYTES RELATIVE PERCENT: 34.6 %
MCH RBC QN AUTO: 24.8 PG (ref 26–34)
MCHC RBC AUTO-ENTMCNC: 31.1 G/DL (ref 31–36)
MCV RBC AUTO: 79.5 FL (ref 80–100)
MONOCYTES ABSOLUTE: 0.9 K/UL (ref 0–1.3)
MONOCYTES RELATIVE PERCENT: 7.4 %
NEUTROPHILS ABSOLUTE: 6.3 K/UL (ref 1.7–7.7)
NEUTROPHILS RELATIVE PERCENT: 54.9 %
PDW BLD-RTO: 16.1 % (ref 12.4–15.4)
PLATELET # BLD: 358 K/UL (ref 135–450)
PMV BLD AUTO: 10.3 FL (ref 5–10.5)
POTASSIUM SERPL-SCNC: 4.5 MMOL/L (ref 3.5–5.1)
RBC # BLD: 4.8 M/UL (ref 4–5.2)
SODIUM BLD-SCNC: 144 MMOL/L (ref 136–145)
TOTAL PROTEIN: 7.6 G/DL (ref 6.4–8.2)
WBC # BLD: 11.5 K/UL (ref 4–11)

## 2019-03-26 PROCEDURE — 1111F DSCHRG MED/CURRENT MED MERGE: CPT | Performed by: INTERNAL MEDICINE

## 2019-03-26 PROCEDURE — 99213 OFFICE O/P EST LOW 20 MIN: CPT | Performed by: INTERNAL MEDICINE

## 2019-03-26 ASSESSMENT — ENCOUNTER SYMPTOMS
EYES NEGATIVE: 1
ANAL BLEEDING: 0
WHEEZING: 0
DIARRHEA: 1
BLOOD IN STOOL: 0
ABDOMINAL DISTENTION: 1
SHORTNESS OF BREATH: 0
VOMITING: 0
SINUS PRESSURE: 0
CONSTIPATION: 0
RHINORRHEA: 0
ABDOMINAL PAIN: 0
RESPIRATORY NEGATIVE: 1
SINUS PAIN: 0
NAUSEA: 0
CHEST TIGHTNESS: 0

## 2019-03-27 ENCOUNTER — TELEPHONE (OUTPATIENT)
Dept: INTERNAL MEDICINE CLINIC | Age: 64
End: 2019-03-27

## 2019-03-27 DIAGNOSIS — D64.9 ANEMIA, UNSPECIFIED TYPE: Primary | ICD-10-CM

## 2019-03-27 DIAGNOSIS — D64.9 ANEMIA, UNSPECIFIED TYPE: ICD-10-CM

## 2019-03-27 LAB
IRON SATURATION: 12 % (ref 15–50)
IRON: 40 UG/DL (ref 37–145)
TOTAL IRON BINDING CAPACITY: 342 UG/DL (ref 260–445)

## 2019-04-18 PROBLEM — J10.1 INFLUENZA A: Status: RESOLVED | Noted: 2019-03-19 | Resolved: 2019-04-18

## 2019-04-22 ENCOUNTER — TELEPHONE (OUTPATIENT)
Dept: INTERNAL MEDICINE CLINIC | Age: 64
End: 2019-04-22

## 2019-04-23 NOTE — TELEPHONE ENCOUNTER
Medical Records request received  Sent to [facility]     All documents scanned into chart    04/23/19

## 2019-04-25 ENCOUNTER — OFFICE VISIT (OUTPATIENT)
Dept: INTERNAL MEDICINE CLINIC | Age: 64
End: 2019-04-25
Payer: COMMERCIAL

## 2019-04-25 VITALS
SYSTOLIC BLOOD PRESSURE: 138 MMHG | HEIGHT: 63 IN | DIASTOLIC BLOOD PRESSURE: 70 MMHG | WEIGHT: 133 LBS | BODY MASS INDEX: 23.57 KG/M2

## 2019-04-25 DIAGNOSIS — R10.13 DYSPEPSIA: Primary | ICD-10-CM

## 2019-04-25 PROCEDURE — 99213 OFFICE O/P EST LOW 20 MIN: CPT | Performed by: INTERNAL MEDICINE

## 2019-04-25 RX ORDER — OMEPRAZOLE 40 MG/1
40 CAPSULE, DELAYED RELEASE ORAL DAILY
Qty: 30 CAPSULE | Refills: 3 | Status: ON HOLD | OUTPATIENT
Start: 2019-04-25 | End: 2020-06-16 | Stop reason: CLARIF

## 2019-04-25 NOTE — PROGRESS NOTES
Follow Up Visit  Established Patient Visit    Patient:  Mitchell Darnell                                               : 1955  Age: 60 y.o. MRN: [id number]  Date : 2019      CHIEF COMPLAINT: Mitchell Darnell is a 60 y.o. female who presents for :  Follow up    59 yo F with PMH of multiple abd surgeries (appendectomy, splenectomy) who was recently admitted in hospital for small bowl obstruction, resolved with conservative management. Also recently treated for flu now resolved. She has been having poor appetite since the bowl obstruction, but after the flu, the appetite seems to be improving, however, she has been feeling full all the time. She felt like she has eaten a 10 course meal all the time even though she has not been eating much at all. She lost 4 lbs since last visit in March and her dyspepsia symptom has not improved at all. She just saw Dr. Glen Man, Colorectal surgeon, who ordered an abd xray which showed some colonic stool stasis. He was given miralax, but she said that has not helped. She said she has been having regular BM about 3-4 times per week. She does not feel constipated and there was no diarrhea. She is otherwise feeling well except for feeling full all the time. She said her last EGD was years ago, she could not remember exactly which year.      Patient Active Problem List    Diagnosis Date Noted    Increased pressure in the eye, right 2018    Smoker 2018    Other headache syndrome 2018    Essential hypertension 2014    Lumbar disc disease 2014    Breast cyst 2014    H/O splenectomy 2012    B12 deficiency 2012    Vitamin D deficiency 2010       Constitutional:  Denies fever or chills   Eyes:  Denies change in visual acuity   HENT:  Denies nasal congestion or sore throat   Respiratory:  Denies cough or shortness of breath   Cardiovascular:  Denies chest pain or edema   GI:  Denies abdominal pain, nausea, vomiting, Cardiovascular:  Normal rate, normal rhythm, no murmurs, no gallops, no rubs   GI:  Soft, nondistended, normal bowel sounds, nontender, no organomegaly, no mass, no rebound, no guarding   :  No costovertebral angle tenderness   Musculoskeletal:  No edema, no tenderness, no deformities. Back- no tenderness  Integument:  Well hydrated, no rash   Lymphatic:  No lymphadenopathy noted   Neurologic:  Alert & oriented x 3, CN 2-12 normal, normal motor function, normal sensory function, no focal deficits noted   Psychiatric:  Speech and behavior appropriate       Vitals: /70 (Site: Right Upper Arm)   Ht 5' 3\" (1.6 m)   Wt 133 lb (60.3 kg)   BMI 23.56 kg/m²     Body mass index is 23.56 kg/m².   Wt Readings from Last 3 Encounters:   04/25/19 133 lb (60.3 kg)   03/26/19 137 lb (62.1 kg)   03/20/19 135 lb (61.2 kg)         LABS:    CBC:   Lab Results   Component Value Date    WBC 11.5 (H) 03/26/2019    HGB 11.9 (L) 03/26/2019    HCT 38.2 03/26/2019    MCV 79.5 (L) 03/26/2019     03/26/2019           Lab Results   Component Value Date    IRON 40 03/26/2019    TIBC 342 03/26/2019    FOLATE 9.53 06/01/2012    VVASNYMR19 972 (H) 01/08/2019                                                             BMP:    Lab Results   Component Value Date     03/26/2019    K 4.5 03/26/2019     03/26/2019    CO2 26 03/26/2019       LFT's:   Lab Results   Component Value Date    ALT 11 03/26/2019    AST 18 03/26/2019    GGT 23 06/01/2012    ALKPHOS 167 (H) 03/26/2019    BILITOT 0.3 03/26/2019       Lipids:   Lab Results   Component Value Date    CHOL 195 01/08/2019    HDL 47 01/08/2019    LDLCALC 135 (H) 01/08/2019    TRIG 64 01/08/2019       INR: No results found for: INR, PROTIME    U/A:  Lab Results   Component Value Date    LABMICR YES 01/08/2019        No results found for: LABA1C     Lab Results   Component Value Date    CREATININE 0.7 03/26/2019 -----------------------------------------------------------------       Assessment/Plan:   1. Dyspepsia  - call if abd swell up, stool stopped moving, or having abd pain  - trial of PPI, if not better will consider EGD or obstruction work up  - omeprazole (PRILOSEC) 40 MG delayed release capsule; Take 1 capsule by mouth daily  Dispense: 30 capsule;  Refill: 3

## 2019-07-22 ENCOUNTER — TELEPHONE (OUTPATIENT)
Dept: INTERNAL MEDICINE CLINIC | Age: 64
End: 2019-07-22

## 2019-09-20 ENCOUNTER — HOSPITAL ENCOUNTER (OUTPATIENT)
Dept: WOMENS IMAGING | Age: 64
Discharge: HOME | End: 2019-09-20
Payer: COMMERCIAL

## 2019-09-20 DIAGNOSIS — Z12.31 SCREENING MAMMOGRAM, ENCOUNTER FOR: ICD-10-CM

## 2019-09-20 PROCEDURE — 77063 BREAST TOMOSYNTHESIS BI: CPT

## 2019-11-05 ENCOUNTER — OFFICE VISIT (OUTPATIENT)
Dept: INTERNAL MEDICINE CLINIC | Age: 64
End: 2019-11-05
Payer: COMMERCIAL

## 2019-11-05 VITALS
DIASTOLIC BLOOD PRESSURE: 76 MMHG | WEIGHT: 143 LBS | HEIGHT: 63 IN | SYSTOLIC BLOOD PRESSURE: 138 MMHG | BODY MASS INDEX: 25.34 KG/M2

## 2019-11-05 DIAGNOSIS — H40.051 INCREASED PRESSURE IN THE EYE, RIGHT: ICD-10-CM

## 2019-11-05 DIAGNOSIS — I10 ESSENTIAL HYPERTENSION: ICD-10-CM

## 2019-11-05 DIAGNOSIS — E53.8 B12 DEFICIENCY: ICD-10-CM

## 2019-11-05 DIAGNOSIS — Z90.81 H/O SPLENECTOMY: ICD-10-CM

## 2019-11-05 DIAGNOSIS — Z00.00 PE (PHYSICAL EXAM), ANNUAL: Primary | ICD-10-CM

## 2019-11-05 DIAGNOSIS — Z12.11 SCREENING FOR COLON CANCER: ICD-10-CM

## 2019-11-05 DIAGNOSIS — Z23 NEED FOR INFLUENZA VACCINATION: ICD-10-CM

## 2019-11-05 PROCEDURE — 90471 IMMUNIZATION ADMIN: CPT | Performed by: INTERNAL MEDICINE

## 2019-11-05 PROCEDURE — 90686 IIV4 VACC NO PRSV 0.5 ML IM: CPT | Performed by: INTERNAL MEDICINE

## 2019-11-05 PROCEDURE — 93000 ELECTROCARDIOGRAM COMPLETE: CPT | Performed by: INTERNAL MEDICINE

## 2019-11-05 PROCEDURE — 99396 PREV VISIT EST AGE 40-64: CPT | Performed by: INTERNAL MEDICINE

## 2019-11-18 DIAGNOSIS — E53.8 B12 DEFICIENCY: ICD-10-CM

## 2019-11-18 DIAGNOSIS — Z00.00 PE (PHYSICAL EXAM), ANNUAL: ICD-10-CM

## 2019-11-18 LAB
A/G RATIO: 1.7 (ref 1.1–2.2)
ALBUMIN SERPL-MCNC: 4.5 G/DL (ref 3.4–5)
ALP BLD-CCNC: 152 U/L (ref 40–129)
ALT SERPL-CCNC: 7 U/L (ref 10–40)
ANION GAP SERPL CALCULATED.3IONS-SCNC: 10 MMOL/L (ref 3–16)
AST SERPL-CCNC: 14 U/L (ref 15–37)
BACTERIA: ABNORMAL /HPF
BASOPHILS ABSOLUTE: 0.1 K/UL (ref 0–0.2)
BASOPHILS RELATIVE PERCENT: 1.3 %
BILIRUB SERPL-MCNC: 0.3 MG/DL (ref 0–1)
BILIRUBIN URINE: NEGATIVE
BLOOD, URINE: NEGATIVE
BUN BLDV-MCNC: 17 MG/DL (ref 7–20)
CALCIUM SERPL-MCNC: 9.4 MG/DL (ref 8.3–10.6)
CHLORIDE BLD-SCNC: 106 MMOL/L (ref 99–110)
CHOLESTEROL, TOTAL: 173 MG/DL (ref 0–199)
CLARITY: ABNORMAL
CO2: 24 MMOL/L (ref 21–32)
COLOR: YELLOW
CREAT SERPL-MCNC: 0.8 MG/DL (ref 0.6–1.2)
CRYSTALS, UA: ABNORMAL /HPF
EOSINOPHILS ABSOLUTE: 0.5 K/UL (ref 0–0.6)
EOSINOPHILS RELATIVE PERCENT: 4.6 %
EPITHELIAL CELLS, UA: ABNORMAL /HPF
GFR AFRICAN AMERICAN: >60
GFR NON-AFRICAN AMERICAN: >60
GLOBULIN: 2.7 G/DL
GLUCOSE BLD-MCNC: 93 MG/DL (ref 70–99)
GLUCOSE URINE: NEGATIVE MG/DL
HCT VFR BLD CALC: 38.4 % (ref 36–48)
HDLC SERPL-MCNC: 42 MG/DL (ref 40–60)
HEMOGLOBIN: 12.3 G/DL (ref 12–16)
KETONES, URINE: NEGATIVE MG/DL
LDL CHOLESTEROL CALCULATED: 113 MG/DL
LEUKOCYTE ESTERASE, URINE: NEGATIVE
LYMPHOCYTES ABSOLUTE: 3.4 K/UL (ref 1–5.1)
LYMPHOCYTES RELATIVE PERCENT: 33.9 %
MCH RBC QN AUTO: 25.3 PG (ref 26–34)
MCHC RBC AUTO-ENTMCNC: 32 G/DL (ref 31–36)
MCV RBC AUTO: 79.3 FL (ref 80–100)
MICROSCOPIC EXAMINATION: YES
MONOCYTES ABSOLUTE: 0.9 K/UL (ref 0–1.3)
MONOCYTES RELATIVE PERCENT: 9.2 %
MUCUS: ABNORMAL /LPF
NEUTROPHILS ABSOLUTE: 5 K/UL (ref 1.7–7.7)
NEUTROPHILS RELATIVE PERCENT: 51 %
NITRITE, URINE: NEGATIVE
PDW BLD-RTO: 16.3 % (ref 12.4–15.4)
PH UA: 5.5 (ref 5–8)
PLATELET # BLD: 370 K/UL (ref 135–450)
PMV BLD AUTO: 9.7 FL (ref 5–10.5)
POTASSIUM SERPL-SCNC: 4.7 MMOL/L (ref 3.5–5.1)
PROTEIN UA: 30 MG/DL
RBC # BLD: 4.84 M/UL (ref 4–5.2)
SODIUM BLD-SCNC: 140 MMOL/L (ref 136–145)
SPECIFIC GRAVITY UA: >=1.03 (ref 1–1.03)
TOTAL PROTEIN: 7.2 G/DL (ref 6.4–8.2)
TRIGL SERPL-MCNC: 90 MG/DL (ref 0–150)
TSH SERPL DL<=0.05 MIU/L-ACNC: 2.15 UIU/ML (ref 0.27–4.2)
URINE TYPE: ABNORMAL
UROBILINOGEN, URINE: 0.2 E.U./DL
VITAMIN B-12: 516 PG/ML (ref 211–911)
VLDLC SERPL CALC-MCNC: 18 MG/DL
WBC # BLD: 9.9 K/UL (ref 4–11)
WBC UA: ABNORMAL /HPF (ref 0–5)

## 2020-02-24 ENCOUNTER — OFFICE VISIT (OUTPATIENT)
Dept: INTERNAL MEDICINE CLINIC | Age: 65
End: 2020-02-24
Payer: COMMERCIAL

## 2020-02-24 VITALS
BODY MASS INDEX: 26.05 KG/M2 | SYSTOLIC BLOOD PRESSURE: 138 MMHG | OXYGEN SATURATION: 100 % | RESPIRATION RATE: 14 BRPM | DIASTOLIC BLOOD PRESSURE: 82 MMHG | TEMPERATURE: 98.2 F | HEART RATE: 75 BPM | WEIGHT: 147 LBS | HEIGHT: 63 IN

## 2020-02-24 PROCEDURE — 99213 OFFICE O/P EST LOW 20 MIN: CPT | Performed by: INTERNAL MEDICINE

## 2020-02-24 RX ORDER — SYRINGE W-NEEDLE,DISPOSAB,3 ML 25GX5/8"
1 SYRINGE, EMPTY DISPOSABLE MISCELLANEOUS
Qty: 12 EACH | Refills: 0 | Status: SHIPPED | OUTPATIENT
Start: 2020-02-24 | End: 2021-12-20 | Stop reason: CLARIF

## 2020-02-24 RX ORDER — CYANOCOBALAMIN 1000 UG/ML
INJECTION INTRAMUSCULAR; SUBCUTANEOUS
Qty: 10 ML | Refills: 1 | Status: SHIPPED | OUTPATIENT
Start: 2020-02-24 | End: 2021-06-16

## 2020-02-24 RX ORDER — PREDNISONE 20 MG/1
40 TABLET ORAL DAILY
Qty: 10 TABLET | Refills: 0 | Status: SHIPPED | OUTPATIENT
Start: 2020-02-24 | End: 2020-02-29

## 2020-02-24 RX ORDER — AMOXICILLIN AND CLAVULANATE POTASSIUM 875; 125 MG/1; MG/1
1 TABLET, FILM COATED ORAL 2 TIMES DAILY
COMMUNITY
Start: 2020-02-22 | End: 2020-03-04

## 2020-02-24 RX ORDER — PROMETHAZINE HYDROCHLORIDE AND CODEINE PHOSPHATE 6.25; 1 MG/5ML; MG/5ML
5 SYRUP ORAL EVERY 4 HOURS PRN
Qty: 180 ML | Refills: 0 | Status: SHIPPED | OUTPATIENT
Start: 2020-02-24 | End: 2020-02-27

## 2020-02-24 SDOH — ECONOMIC STABILITY: FOOD INSECURITY: WITHIN THE PAST 12 MONTHS, YOU WORRIED THAT YOUR FOOD WOULD RUN OUT BEFORE YOU GOT MONEY TO BUY MORE.: NEVER TRUE

## 2020-02-24 SDOH — ECONOMIC STABILITY: TRANSPORTATION INSECURITY
IN THE PAST 12 MONTHS, HAS THE LACK OF TRANSPORTATION KEPT YOU FROM MEDICAL APPOINTMENTS OR FROM GETTING MEDICATIONS?: NO

## 2020-02-24 SDOH — ECONOMIC STABILITY: INCOME INSECURITY: HOW HARD IS IT FOR YOU TO PAY FOR THE VERY BASICS LIKE FOOD, HOUSING, MEDICAL CARE, AND HEATING?: NOT HARD AT ALL

## 2020-02-24 SDOH — ECONOMIC STABILITY: FOOD INSECURITY: WITHIN THE PAST 12 MONTHS, THE FOOD YOU BOUGHT JUST DIDN'T LAST AND YOU DIDN'T HAVE MONEY TO GET MORE.: NEVER TRUE

## 2020-02-24 SDOH — ECONOMIC STABILITY: TRANSPORTATION INSECURITY
IN THE PAST 12 MONTHS, HAS LACK OF TRANSPORTATION KEPT YOU FROM MEETINGS, WORK, OR FROM GETTING THINGS NEEDED FOR DAILY LIVING?: NO

## 2020-02-24 ASSESSMENT — ENCOUNTER SYMPTOMS
BACK PAIN: 0
NAUSEA: 0
WHEEZING: 0
CHEST TIGHTNESS: 0
COUGH: 1
SHORTNESS OF BREATH: 0
SINUS PRESSURE: 1
EYE REDNESS: 0
SORE THROAT: 1
ABDOMINAL PAIN: 0

## 2020-02-24 ASSESSMENT — PATIENT HEALTH QUESTIONNAIRE - PHQ9
SUM OF ALL RESPONSES TO PHQ QUESTIONS 1-9: 0
SUM OF ALL RESPONSES TO PHQ QUESTIONS 1-9: 0
1. LITTLE INTEREST OR PLEASURE IN DOING THINGS: 0
2. FEELING DOWN, DEPRESSED OR HOPELESS: 0
SUM OF ALL RESPONSES TO PHQ9 QUESTIONS 1 & 2: 0

## 2020-02-24 NOTE — PROGRESS NOTES
Subjective:      Patient ID: Dedra Singh is a 63 y.o. female    Chief Complaint   Patient presents with    Otalgia     started 2/22/20    Pharyngitis    Cough       Cough   This is a new problem. The current episode started in the past 7 days. The cough is productive of sputum. Associated symptoms include ear congestion, ear pain, a fever, nasal congestion and a sore throat. Pertinent negatives include no chest pain, chills, eye redness, headaches, rash, shortness of breath, weight loss or wheezing. The symptoms are aggravated by cold air. Treatments tried: She had a negative strep test and flu test and was started on Augmentin by Urgent Care. The treatment provided mild relief. Her past medical history is significant for asthma and pneumonia. There is no history of COPD. Current Outpatient Medications on File Prior to Visit   Medication Sig Dispense Refill    amoxicillin-clavulanate (AUGMENTIN) 875-125 MG per tablet Take 1 tablet by mouth 2 times daily      omeprazole (PRILOSEC) 40 MG delayed release capsule Take 1 capsule by mouth daily 30 capsule 3    aspirin 81 MG chewable tablet Take 1 tablet by mouth daily. 30 tablet 5    Cholecalciferol (VITAMIN D) 2000 UNITS CAPS capsule Take 1 capsule by mouth daily. 30 capsule 0     No current facility-administered medications on file prior to visit. Allergies   Allergen Reactions    Erythromycin     Sulfa Antibiotics        Review of Systems   Constitutional: Positive for fever. Negative for chills, fatigue, unexpected weight change and weight loss. HENT: Positive for ear pain, sinus pressure and sore throat. Negative for congestion and hearing loss. Eyes: Negative for redness and visual disturbance. Respiratory: Positive for cough. Negative for chest tightness, shortness of breath and wheezing. Cardiovascular: Negative for chest pain and leg swelling. Gastrointestinal: Negative for abdominal pain and nausea.    Endocrine: Negative for cold intolerance, heat intolerance, polydipsia and polyuria. Genitourinary: Negative for dysuria and frequency. Musculoskeletal: Negative for arthralgias, back pain and neck pain. Skin: Negative for rash and wound. Neurological: Negative for dizziness, weakness and headaches. Hematological: Negative for adenopathy. Does not bruise/bleed easily. Psychiatric/Behavioral: Negative for sleep disturbance. The patient is not nervous/anxious. Objective:   Physical Exam  Constitutional:       Appearance: Normal appearance. HENT:      Right Ear: Tympanic membrane normal.      Left Ear: Tympanic membrane normal.      Nose: Congestion and rhinorrhea present. Mouth/Throat:      Pharynx: Posterior oropharyngeal erythema present. Eyes:      Conjunctiva/sclera: Conjunctivae normal.      Pupils: Pupils are equal, round, and reactive to light. Cardiovascular:      Rate and Rhythm: Normal rate and regular rhythm. Pulmonary:      Effort: Pulmonary effort is normal.      Breath sounds: Normal breath sounds. No wheezing or rales. Lymphadenopathy:      Cervical: No cervical adenopathy. Neurological:      Mental Status: She is alert. Assessment and plan       1. URI, acute  URI. Probably viral.  Continue Augmentin. Add cough medicine and steroid. - promethazine-codeine (PHENERGAN WITH CODEINE) 6.25-10 MG/5ML syrup; Take 5 mLs by mouth every 4 hours as needed for Cough for up to 3 days. Dispense: 180 mL; Refill: 0  Prednisone 20 mg tablets 2 tablets daily for 5 days. 2. B12 deficiency  Stable. Refill B12 and syringes. - cyanocobalamin 1000 MCG/ML injection; INJECT 1 ML INTO THE MUSCLE EVERY 30 DAYS  Dispense: 10 mL; Refill: 1  - Syringe/Needle, Disp, (SYRINGE 3CC/25GX1\") 25G X 1\" 3 ML MISC; 1 each by Does not apply route every 30 days  Dispense: 12 each;  Refill: 0

## 2020-02-26 ENCOUNTER — TELEPHONE (OUTPATIENT)
Dept: INTERNAL MEDICINE CLINIC | Age: 65
End: 2020-02-26

## 2020-02-26 RX ORDER — FLUCONAZOLE 100 MG/1
100 TABLET ORAL DAILY
Qty: 7 TABLET | Refills: 0 | Status: SHIPPED | OUTPATIENT
Start: 2020-02-26 | End: 2020-03-04

## 2020-02-26 NOTE — TELEPHONE ENCOUNTER
Pt states she seen Dr. Zach Hanna on Monday. Dr. Zach Hanna prescribed her medication and now she has a yeast infection. Pt requesting medication to be called in for treatment

## 2020-02-26 NOTE — TELEPHONE ENCOUNTER
Medicine sent to her [employer].       Orders Placed This Encounter   Medications    fluconazole (DIFLUCAN) 100 MG tablet     Sig: Take 1 tablet by mouth daily for 7 days     Dispense:  7 tablet     Refill:  0

## 2020-03-13 ENCOUNTER — TELEPHONE (OUTPATIENT)
Dept: INTERNAL MEDICINE CLINIC | Age: 65
End: 2020-03-13

## 2020-03-24 ENCOUNTER — TELEPHONE (OUTPATIENT)
Dept: INTERNAL MEDICINE CLINIC | Age: 65
End: 2020-03-24

## 2020-03-24 NOTE — TELEPHONE ENCOUNTER
The patient is currently off till 4/1 and she is checking to see if the  wants her to be off longer due to her health conditions. She is a  at [employer].  Please advise

## 2020-06-16 ENCOUNTER — HOSPITAL ENCOUNTER (INPATIENT)
Age: 65
LOS: 2 days | Discharge: HOME | DRG: 690 | End: 2020-06-18
Attending: EMERGENCY MEDICINE | Admitting: INTERNAL MEDICINE
Payer: COMMERCIAL

## 2020-06-16 ENCOUNTER — APPOINTMENT (OUTPATIENT)
Dept: CT IMAGING | Age: 65
DRG: 690 | End: 2020-06-16
Payer: COMMERCIAL

## 2020-06-16 PROBLEM — N30.90 CYSTITIS: Status: ACTIVE | Noted: 2020-06-16

## 2020-06-16 PROBLEM — N39.0 UTI (URINARY TRACT INFECTION): Status: ACTIVE | Noted: 2020-06-16

## 2020-06-16 LAB
ALBUMIN SERPL-MCNC: 4.3 G/DL (ref 3.4–5)
ALP BLD-CCNC: 181 U/L (ref 40–129)
ALT SERPL-CCNC: 11 U/L (ref 10–40)
AMORPHOUS: ABNORMAL /HPF
ANION GAP SERPL CALCULATED.3IONS-SCNC: 12 MMOL/L (ref 3–16)
AST SERPL-CCNC: 21 U/L (ref 15–37)
BACTERIA: ABNORMAL /HPF
BASOPHILS ABSOLUTE: 0.1 K/UL (ref 0–0.2)
BASOPHILS RELATIVE PERCENT: 0.7 %
BILIRUB SERPL-MCNC: 0.3 MG/DL (ref 0–1)
BILIRUBIN DIRECT: <0.2 MG/DL (ref 0–0.3)
BILIRUBIN URINE: NEGATIVE
BILIRUBIN, INDIRECT: ABNORMAL MG/DL (ref 0–1)
BLOOD, URINE: ABNORMAL
BUN BLDV-MCNC: 10 MG/DL (ref 7–20)
CALCIUM SERPL-MCNC: 9.9 MG/DL (ref 8.3–10.6)
CHLORIDE BLD-SCNC: 104 MMOL/L (ref 99–110)
CLARITY: CLEAR
CO2: 24 MMOL/L (ref 21–32)
COLOR: YELLOW
CREAT SERPL-MCNC: 0.7 MG/DL (ref 0.6–1.2)
EOSINOPHILS ABSOLUTE: 0.1 K/UL (ref 0–0.6)
EOSINOPHILS RELATIVE PERCENT: 0.6 %
EPITHELIAL CELLS, UA: ABNORMAL /HPF (ref 0–5)
GFR AFRICAN AMERICAN: >60
GFR NON-AFRICAN AMERICAN: >60
GLUCOSE BLD-MCNC: 128 MG/DL (ref 70–99)
GLUCOSE URINE: NEGATIVE MG/DL
HCT VFR BLD CALC: 40.8 % (ref 36–48)
HEMOGLOBIN: 12.6 G/DL (ref 12–16)
KETONES, URINE: NEGATIVE MG/DL
LEUKOCYTE ESTERASE, URINE: ABNORMAL
LIPASE: 39 U/L (ref 13–60)
LYMPHOCYTES ABSOLUTE: 3.4 K/UL (ref 1–5.1)
LYMPHOCYTES RELATIVE PERCENT: 17.2 %
MCH RBC QN AUTO: 25.5 PG (ref 26–34)
MCHC RBC AUTO-ENTMCNC: 30.8 G/DL (ref 31–36)
MCV RBC AUTO: 82.9 FL (ref 80–100)
MICROSCOPIC EXAMINATION: YES
MONOCYTES ABSOLUTE: 1.7 K/UL (ref 0–1.3)
MONOCYTES RELATIVE PERCENT: 8.4 %
NEUTROPHILS ABSOLUTE: 14.6 K/UL (ref 1.7–7.7)
NEUTROPHILS RELATIVE PERCENT: 73.1 %
NITRITE, URINE: NEGATIVE
PDW BLD-RTO: 16 % (ref 12.4–15.4)
PH UA: 6 (ref 5–8)
PLATELET # BLD: 353 K/UL (ref 135–450)
PMV BLD AUTO: 8.8 FL (ref 5–10.5)
POTASSIUM REFLEX MAGNESIUM: 3.9 MMOL/L (ref 3.5–5.1)
PROTEIN UA: 100 MG/DL
RBC # BLD: 4.92 M/UL (ref 4–5.2)
RBC UA: ABNORMAL /HPF (ref 0–4)
REASON FOR REJECTION: NORMAL
REJECTED TEST: NORMAL
SODIUM BLD-SCNC: 140 MMOL/L (ref 136–145)
SPECIFIC GRAVITY UA: >=1.03 (ref 1–1.03)
TOTAL PROTEIN: 8.4 G/DL (ref 6.4–8.2)
URINE TYPE: ABNORMAL
UROBILINOGEN, URINE: 0.2 E.U./DL
WBC # BLD: 20 K/UL (ref 4–11)
WBC UA: ABNORMAL /HPF (ref 0–5)

## 2020-06-16 PROCEDURE — 2580000003 HC RX 258: Performed by: STUDENT IN AN ORGANIZED HEALTH CARE EDUCATION/TRAINING PROGRAM

## 2020-06-16 PROCEDURE — 6360000002 HC RX W HCPCS: Performed by: STUDENT IN AN ORGANIZED HEALTH CARE EDUCATION/TRAINING PROGRAM

## 2020-06-16 PROCEDURE — 81001 URINALYSIS AUTO W/SCOPE: CPT

## 2020-06-16 PROCEDURE — 87086 URINE CULTURE/COLONY COUNT: CPT

## 2020-06-16 PROCEDURE — 6370000000 HC RX 637 (ALT 250 FOR IP): Performed by: STUDENT IN AN ORGANIZED HEALTH CARE EDUCATION/TRAINING PROGRAM

## 2020-06-16 PROCEDURE — 80076 HEPATIC FUNCTION PANEL: CPT

## 2020-06-16 PROCEDURE — 96376 TX/PRO/DX INJ SAME DRUG ADON: CPT

## 2020-06-16 PROCEDURE — 85025 COMPLETE CBC W/AUTO DIFF WBC: CPT

## 2020-06-16 PROCEDURE — 96375 TX/PRO/DX INJ NEW DRUG ADDON: CPT

## 2020-06-16 PROCEDURE — 6360000004 HC RX CONTRAST MEDICATION: Performed by: EMERGENCY MEDICINE

## 2020-06-16 PROCEDURE — 99285 EMERGENCY DEPT VISIT HI MDM: CPT

## 2020-06-16 PROCEDURE — 83690 ASSAY OF LIPASE: CPT

## 2020-06-16 PROCEDURE — 96365 THER/PROPH/DIAG IV INF INIT: CPT

## 2020-06-16 PROCEDURE — 80048 BASIC METABOLIC PNL TOTAL CA: CPT

## 2020-06-16 PROCEDURE — 74177 CT ABD & PELVIS W/CONTRAST: CPT

## 2020-06-16 PROCEDURE — 36415 COLL VENOUS BLD VENIPUNCTURE: CPT

## 2020-06-16 PROCEDURE — 1200000000 HC SEMI PRIVATE

## 2020-06-16 RX ORDER — ONDANSETRON 2 MG/ML
4 INJECTION INTRAMUSCULAR; INTRAVENOUS ONCE
Status: COMPLETED | OUTPATIENT
Start: 2020-06-16 | End: 2020-06-16

## 2020-06-16 RX ORDER — ACETAMINOPHEN 650 MG/1
650 SUPPOSITORY RECTAL EVERY 6 HOURS PRN
Status: DISCONTINUED | OUTPATIENT
Start: 2020-06-16 | End: 2020-06-18 | Stop reason: HOSPADM

## 2020-06-16 RX ORDER — SODIUM CHLORIDE 0.9 % (FLUSH) 0.9 %
10 SYRINGE (ML) INJECTION PRN
Status: CANCELLED | OUTPATIENT
Start: 2020-06-16

## 2020-06-16 RX ORDER — ACETAMINOPHEN 325 MG/1
650 TABLET ORAL EVERY 6 HOURS PRN
Status: CANCELLED | OUTPATIENT
Start: 2020-06-16

## 2020-06-16 RX ORDER — PROMETHAZINE HYDROCHLORIDE 12.5 MG/1
12.5 TABLET ORAL EVERY 6 HOURS PRN
Status: DISCONTINUED | OUTPATIENT
Start: 2020-06-16 | End: 2020-06-18 | Stop reason: HOSPADM

## 2020-06-16 RX ORDER — PROMETHAZINE HYDROCHLORIDE 25 MG/1
12.5 TABLET ORAL EVERY 6 HOURS PRN
Status: CANCELLED | OUTPATIENT
Start: 2020-06-16

## 2020-06-16 RX ORDER — ONDANSETRON 2 MG/ML
4 INJECTION INTRAMUSCULAR; INTRAVENOUS EVERY 6 HOURS PRN
Status: DISCONTINUED | OUTPATIENT
Start: 2020-06-16 | End: 2020-06-18 | Stop reason: HOSPADM

## 2020-06-16 RX ORDER — SODIUM CHLORIDE 0.9 % (FLUSH) 0.9 %
10 SYRINGE (ML) INJECTION EVERY 12 HOURS SCHEDULED
Status: CANCELLED | OUTPATIENT
Start: 2020-06-16

## 2020-06-16 RX ORDER — 0.9 % SODIUM CHLORIDE 0.9 %
1000 INTRAVENOUS SOLUTION INTRAVENOUS ONCE
Status: COMPLETED | OUTPATIENT
Start: 2020-06-16 | End: 2020-06-16

## 2020-06-16 RX ORDER — SODIUM CHLORIDE 0.9 % (FLUSH) 0.9 %
10 SYRINGE (ML) INJECTION EVERY 12 HOURS SCHEDULED
Status: DISCONTINUED | OUTPATIENT
Start: 2020-06-16 | End: 2020-06-18 | Stop reason: HOSPADM

## 2020-06-16 RX ORDER — POLYETHYLENE GLYCOL 3350 17 G/17G
17 POWDER, FOR SOLUTION ORAL DAILY PRN
Status: DISCONTINUED | OUTPATIENT
Start: 2020-06-16 | End: 2020-06-18 | Stop reason: HOSPADM

## 2020-06-16 RX ORDER — OXYCODONE HYDROCHLORIDE AND ACETAMINOPHEN 5; 325 MG/1; MG/1
1 TABLET ORAL EVERY 4 HOURS PRN
Status: DISCONTINUED | OUTPATIENT
Start: 2020-06-16 | End: 2020-06-17

## 2020-06-16 RX ORDER — PANTOPRAZOLE SODIUM 40 MG/1
40 TABLET, DELAYED RELEASE ORAL
Status: CANCELLED | OUTPATIENT
Start: 2020-06-17

## 2020-06-16 RX ORDER — POLYETHYLENE GLYCOL 3350 17 G/17G
17 POWDER, FOR SOLUTION ORAL DAILY PRN
Status: CANCELLED | OUTPATIENT
Start: 2020-06-16

## 2020-06-16 RX ORDER — ACETAMINOPHEN 325 MG/1
650 TABLET ORAL EVERY 6 HOURS PRN
Status: DISCONTINUED | OUTPATIENT
Start: 2020-06-16 | End: 2020-06-18 | Stop reason: HOSPADM

## 2020-06-16 RX ORDER — MORPHINE SULFATE 4 MG/ML
4 INJECTION, SOLUTION INTRAMUSCULAR; INTRAVENOUS ONCE
Status: COMPLETED | OUTPATIENT
Start: 2020-06-16 | End: 2020-06-16

## 2020-06-16 RX ORDER — MULTIVIT-MIN/IRON/FOLIC ACID/K 18-600-40
1 CAPSULE ORAL DAILY
Status: CANCELLED | OUTPATIENT
Start: 2020-06-16

## 2020-06-16 RX ORDER — MORPHINE SULFATE 2 MG/ML
2 INJECTION, SOLUTION INTRAMUSCULAR; INTRAVENOUS EVERY 4 HOURS PRN
Status: CANCELLED | OUTPATIENT
Start: 2020-06-16

## 2020-06-16 RX ORDER — VITAMIN B COMPLEX
2000 TABLET ORAL DAILY
Status: DISCONTINUED | OUTPATIENT
Start: 2020-06-17 | End: 2020-06-18 | Stop reason: HOSPADM

## 2020-06-16 RX ORDER — SODIUM CHLORIDE 0.9 % (FLUSH) 0.9 %
10 SYRINGE (ML) INJECTION PRN
Status: DISCONTINUED | OUTPATIENT
Start: 2020-06-16 | End: 2020-06-18 | Stop reason: HOSPADM

## 2020-06-16 RX ORDER — SODIUM CHLORIDE 9 MG/ML
INJECTION, SOLUTION INTRAVENOUS CONTINUOUS
Status: DISCONTINUED | OUTPATIENT
Start: 2020-06-16 | End: 2020-06-18 | Stop reason: HOSPADM

## 2020-06-16 RX ORDER — SODIUM CHLORIDE 9 MG/ML
INJECTION, SOLUTION INTRAVENOUS CONTINUOUS
Status: CANCELLED | OUTPATIENT
Start: 2020-06-16

## 2020-06-16 RX ORDER — ONDANSETRON 2 MG/ML
4 INJECTION INTRAMUSCULAR; INTRAVENOUS EVERY 6 HOURS PRN
Status: CANCELLED | OUTPATIENT
Start: 2020-06-16

## 2020-06-16 RX ORDER — ASPIRIN 81 MG/1
81 TABLET, CHEWABLE ORAL DAILY
Status: CANCELLED | OUTPATIENT
Start: 2020-06-16

## 2020-06-16 RX ORDER — ACETAMINOPHEN 650 MG/1
650 SUPPOSITORY RECTAL EVERY 6 HOURS PRN
Status: CANCELLED | OUTPATIENT
Start: 2020-06-16

## 2020-06-16 RX ADMIN — CEFTRIAXONE 1 G: 1 INJECTION, POWDER, FOR SOLUTION INTRAMUSCULAR; INTRAVENOUS at 18:54

## 2020-06-16 RX ADMIN — OXYCODONE HYDROCHLORIDE AND ACETAMINOPHEN 1 TABLET: 5; 325 TABLET ORAL at 22:05

## 2020-06-16 RX ADMIN — ONDANSETRON 4 MG: 2 INJECTION INTRAMUSCULAR; INTRAVENOUS at 17:12

## 2020-06-16 RX ADMIN — Medication 10 ML: at 22:10

## 2020-06-16 RX ADMIN — SODIUM CHLORIDE 1000 ML: 9 INJECTION, SOLUTION INTRAVENOUS at 17:12

## 2020-06-16 RX ADMIN — IOPAMIDOL 80 ML: 755 INJECTION, SOLUTION INTRAVENOUS at 18:20

## 2020-06-16 RX ADMIN — SODIUM CHLORIDE: 9 INJECTION, SOLUTION INTRAVENOUS at 22:09

## 2020-06-16 RX ADMIN — MORPHINE SULFATE 4 MG: 4 INJECTION INTRAVENOUS at 17:12

## 2020-06-16 RX ADMIN — MORPHINE SULFATE 4 MG: 4 INJECTION INTRAVENOUS at 18:54

## 2020-06-16 ASSESSMENT — PAIN SCALES - GENERAL
PAINLEVEL_OUTOF10: 5
PAINLEVEL_OUTOF10: 7
PAINLEVEL_OUTOF10: 2
PAINLEVEL_OUTOF10: 10
PAINLEVEL_OUTOF10: 5
PAINLEVEL_OUTOF10: 10

## 2020-06-16 ASSESSMENT — PAIN - FUNCTIONAL ASSESSMENT: PAIN_FUNCTIONAL_ASSESSMENT: ACTIVITIES ARE NOT PREVENTED

## 2020-06-16 ASSESSMENT — ENCOUNTER SYMPTOMS
SINUS PRESSURE: 0
PHOTOPHOBIA: 0
BACK PAIN: 1
VOMITING: 0
NAUSEA: 1
ABDOMINAL PAIN: 0
ABDOMINAL DISTENTION: 1
COUGH: 0
NAUSEA: 0
CONSTIPATION: 0
SINUS PAIN: 0
RHINORRHEA: 0
SORE THROAT: 0
SHORTNESS OF BREATH: 0
COLOR CHANGE: 0
ABDOMINAL PAIN: 1
CHEST TIGHTNESS: 0
BLOOD IN STOOL: 0
DIARRHEA: 0

## 2020-06-16 ASSESSMENT — PAIN DESCRIPTION - ORIENTATION: ORIENTATION: LOWER

## 2020-06-16 ASSESSMENT — PAIN DESCRIPTION - ONSET: ONSET: ON-GOING

## 2020-06-16 ASSESSMENT — PAIN DESCRIPTION - DESCRIPTORS: DESCRIPTORS: DULL

## 2020-06-16 ASSESSMENT — PAIN DESCRIPTION - PAIN TYPE
TYPE: ACUTE PAIN
TYPE: ACUTE PAIN

## 2020-06-16 ASSESSMENT — PAIN DESCRIPTION - PROGRESSION
CLINICAL_PROGRESSION: GRADUALLY IMPROVING
CLINICAL_PROGRESSION: GRADUALLY WORSENING

## 2020-06-16 ASSESSMENT — PAIN DESCRIPTION - FREQUENCY: FREQUENCY: CONTINUOUS

## 2020-06-16 ASSESSMENT — PAIN DESCRIPTION - LOCATION
LOCATION: ABDOMEN
LOCATION: ABDOMEN;FLANK;BACK

## 2020-06-16 NOTE — ED PROVIDER NOTES
[address] [facility] [address] ENCOUNTER          PHYSICIAN ASSISTANT NOTE     Date of evaluation: 6/16/2020    ADDENDUM:      Care of this patient was assumed from my colleague, Dr. Jermain Coy. The patient was seen for Abdominal Pain; Flank Pain; Back Pain; and Urinary Frequency  . The patient's initial evaluation and plan have been discussed with the prior provider who initially evaluated the patient. Nursing Notes, Past Medical Hx, Past Surgical Hx, Social Hx, Allergies, and Family Hx were all reviewed. Diagnostic Results     EKG   Please see the previous provider's note for any ECG interpretation. RADIOLOGY:  CT ABDOMEN PELVIS WO CONTRAST Additional Contrast? Radiologist Recommendation   Final Result   1. No significant change from the study of the prior day. 2. Contrast noted within the bilateral renal collecting systems, ureters and bladder. 3. No discrete nephrolithiasis, although the presence of contrast in the collecting system is a limiting factor. See the prior days report for those findings. CT ABDOMEN PELVIS W IV CONTRAST Additional Contrast? None   Final Result   1. There is abnormal enhancement of the right ureter. There is abnormal enhancement of the left mid ureter and left distal ureter. There is some abnormal urinary bladder wall enhancement. Findings can be seen with a cystitis-ureteritis. LABS:   Results for orders placed or performed during the hospital encounter of 06/16/20   Culture, Blood 1   Result Value Ref Range    Blood Culture, Routine No Growth after 4 days of incubation. Culture, Blood 2   Result Value Ref Range    Culture, Blood 2 No Growth after 4 days of incubation.     Culture, Urine   Result Value Ref Range    Urine Culture, Routine No growth at 18 to 36 hours    Basic Metabolic Panel w/ Reflex to MG   Result Value Ref Range    Sodium 140 136 - 145 mmol/L    Potassium reflex Magnesium 3.9 3.5 - 5.1 mmol/L    Chloride 104 99 - 110 mmol/L    CO2 24 21 - 32 mmol/L    Anion Gap 12 3 - 16    Glucose 128 (H) 70 - 99 mg/dL    BUN 10 7 - 20 mg/dL    CREATININE 0.7 0.6 - 1.2 mg/dL    GFR Non-African American >60 >60    GFR African American >60 >60    Calcium 9.9 8.3 - 10.6 mg/dL   Hepatic Function Panel   Result Value Ref Range    Total Protein 8.4 (H) 6.4 - 8.2 g/dL    Alb 4.3 3.4 - 5.0 g/dL    Alkaline Phosphatase 181 (H) 40 - 129 U/L    ALT 11 10 - 40 U/L    AST 21 15 - 37 U/L    Total Bilirubin 0.3 0.0 - 1.0 mg/dL    Bilirubin, Direct <0.2 0.0 - 0.3 mg/dL    Bilirubin, Indirect see below 0.0 - 1.0 mg/dL   Lipase   Result Value Ref Range    Lipase 39.0 13.0 - 60.0 U/L   Urinalysis, reflex to microscopic   Result Value Ref Range    Color, UA Yellow Straw/Yellow    Clarity, UA Clear Clear    Glucose, Ur Negative Negative mg/dL    Bilirubin Urine Negative Negative    Ketones, Urine Negative Negative mg/dL    Specific Gravity, UA >=1.030 1.005 - 1.030    Blood, Urine LARGE (A) Negative    pH, UA 6.0 5.0 - 8.0    Protein,  (A) Negative mg/dL    Urobilinogen, Urine 0.2 <2.0 E.U./dL    Nitrite, Urine Negative Negative    Leukocyte Esterase, Urine MODERATE (A) Negative    Microscopic Examination YES     Urine Type NotGiven    Microscopic Urinalysis   Result Value Ref Range    WBC, UA 21-50 (A) 0 - 5 /HPF    RBC, UA 21-50 (A) 0 - 4 /HPF    Epithelial Cells, UA 0-1 0 - 5 /HPF    Bacteria, UA 1+ (A) None Seen /HPF    Amorphous, UA Rare /HPF   SPECIMEN REJECTION   Result Value Ref Range    Rejected Test cbcwd     Reason for Rejection see below    CBC Auto Differential   Result Value Ref Range    WBC 20.0 (H) 4.0 - 11.0 K/uL    RBC 4.92 4.00 - 5.20 M/uL    Hemoglobin 12.6 12.0 - 16.0 g/dL    Hematocrit 40.8 36.0 - 48.0 %    MCV 82.9 80.0 - 100.0 fL    MCH 25.5 (L) 26.0 - 34.0 pg    MCHC 30.8 (L) 31.0 - 36.0 g/dL    RDW 16.0 (H) 12.4 - 15.4 %    Platelets 594 763 - 874 K/uL    MPV 8.8 5.0 - 10.5 fL    Neutrophils % 73.1 %    Lymphocytes % 17.2 %

## 2020-06-16 NOTE — ED PROVIDER NOTES
ED Attending Attestation Note     Date of evaluation: 6/16/2020    This patient was seen by the resident. I have seen and examined the patient, agree with the workup, evaluation, management and diagnosis. The care plan has been discussed. My assessment reveals a 81-year-old female presenting today with right-sided abdominal pain, right flank pain, and urinary frequency and dysuria. She was seen by her gynecologist, who recommended presentation to the emergency department to rule out an infected kidney stone. On exam, she is tachycardic but otherwise hemodynamically stable. She has abdominal bloating but no peritoneal signs, rebound, or guarding. She does have tenderness to palpation in the right side of her abdomen as well as right CVA tenderness. Heart is tachycardic but otherwise regular rhythm. Lungs are clear to auscultation bilaterally. At this time, we initially started with basic lab work. We proceeded with CT abdomen pelvis with IV contrast to evaluate for kidney stone, kidney abscess, appendicitis, TOA, etc.  She was given pain control. Patient was in agreement this plan and all questions were answered.      Celsa Crawford MD  06/16/20 1800

## 2020-06-16 NOTE — ED TRIAGE NOTES
Pt arrives from Dr. Toni Conner office due to possible kidney stone or kidney infection. Patient is c/o right sided abdominal pain, flank pain, and back pain. Patient states that her urine is a light pink color when she wipes.

## 2020-06-17 ENCOUNTER — APPOINTMENT (OUTPATIENT)
Dept: CT IMAGING | Age: 65
DRG: 690 | End: 2020-06-17
Payer: COMMERCIAL

## 2020-06-17 LAB
ANION GAP SERPL CALCULATED.3IONS-SCNC: 10 MMOL/L (ref 3–16)
BASOPHILS ABSOLUTE: 0.2 K/UL (ref 0–0.2)
BASOPHILS RELATIVE PERCENT: 1.2 %
BUN BLDV-MCNC: 8 MG/DL (ref 7–20)
CALCIUM SERPL-MCNC: 8.6 MG/DL (ref 8.3–10.6)
CHLORIDE BLD-SCNC: 110 MMOL/L (ref 99–110)
CO2: 23 MMOL/L (ref 21–32)
CREAT SERPL-MCNC: 0.6 MG/DL (ref 0.6–1.2)
EOSINOPHILS ABSOLUTE: 0.3 K/UL (ref 0–0.6)
EOSINOPHILS RELATIVE PERCENT: 1.8 %
GFR AFRICAN AMERICAN: >60
GFR NON-AFRICAN AMERICAN: >60
GLUCOSE BLD-MCNC: 137 MG/DL (ref 70–99)
HCT VFR BLD CALC: 34.3 % (ref 36–48)
HEMOGLOBIN: 10.7 G/DL (ref 12–16)
LYMPHOCYTES ABSOLUTE: 4.5 K/UL (ref 1–5.1)
LYMPHOCYTES RELATIVE PERCENT: 30.7 %
MCH RBC QN AUTO: 25.5 PG (ref 26–34)
MCHC RBC AUTO-ENTMCNC: 31.3 G/DL (ref 31–36)
MCV RBC AUTO: 81.5 FL (ref 80–100)
MONOCYTES ABSOLUTE: 1.4 K/UL (ref 0–1.3)
MONOCYTES RELATIVE PERCENT: 9.7 %
NEUTROPHILS ABSOLUTE: 8.3 K/UL (ref 1.7–7.7)
NEUTROPHILS RELATIVE PERCENT: 56.6 %
PDW BLD-RTO: 15.8 % (ref 12.4–15.4)
PLATELET # BLD: 310 K/UL (ref 135–450)
PMV BLD AUTO: 9.1 FL (ref 5–10.5)
POTASSIUM REFLEX MAGNESIUM: 3.8 MMOL/L (ref 3.5–5.1)
RBC # BLD: 4.21 M/UL (ref 4–5.2)
SODIUM BLD-SCNC: 143 MMOL/L (ref 136–145)
URINE CULTURE, ROUTINE: NORMAL
WBC # BLD: 14.7 K/UL (ref 4–11)

## 2020-06-17 PROCEDURE — 51798 US URINE CAPACITY MEASURE: CPT

## 2020-06-17 PROCEDURE — 6360000002 HC RX W HCPCS: Performed by: STUDENT IN AN ORGANIZED HEALTH CARE EDUCATION/TRAINING PROGRAM

## 2020-06-17 PROCEDURE — 80048 BASIC METABOLIC PNL TOTAL CA: CPT

## 2020-06-17 PROCEDURE — 85025 COMPLETE CBC W/AUTO DIFF WBC: CPT

## 2020-06-17 PROCEDURE — 99222 1ST HOSP IP/OBS MODERATE 55: CPT | Performed by: INTERNAL MEDICINE

## 2020-06-17 PROCEDURE — 36415 COLL VENOUS BLD VENIPUNCTURE: CPT

## 2020-06-17 PROCEDURE — 74176 CT ABD & PELVIS W/O CONTRAST: CPT

## 2020-06-17 PROCEDURE — 87040 BLOOD CULTURE FOR BACTERIA: CPT

## 2020-06-17 PROCEDURE — 6370000000 HC RX 637 (ALT 250 FOR IP): Performed by: STUDENT IN AN ORGANIZED HEALTH CARE EDUCATION/TRAINING PROGRAM

## 2020-06-17 PROCEDURE — 2580000003 HC RX 258: Performed by: STUDENT IN AN ORGANIZED HEALTH CARE EDUCATION/TRAINING PROGRAM

## 2020-06-17 PROCEDURE — 1200000000 HC SEMI PRIVATE

## 2020-06-17 RX ORDER — OXYCODONE HYDROCHLORIDE 5 MG/1
2.5 TABLET ORAL EVERY 6 HOURS PRN
Status: DISCONTINUED | OUTPATIENT
Start: 2020-06-17 | End: 2020-06-18

## 2020-06-17 RX ORDER — OXYCODONE HYDROCHLORIDE 5 MG/1
5 TABLET ORAL EVERY 6 HOURS PRN
Status: DISCONTINUED | OUTPATIENT
Start: 2020-06-17 | End: 2020-06-18

## 2020-06-17 RX ADMIN — SODIUM CHLORIDE: 9 INJECTION, SOLUTION INTRAVENOUS at 18:48

## 2020-06-17 RX ADMIN — CEFTRIAXONE 1 G: 1 INJECTION, POWDER, FOR SOLUTION INTRAMUSCULAR; INTRAVENOUS at 18:47

## 2020-06-17 RX ADMIN — OXYCODONE 5 MG: 5 TABLET ORAL at 19:50

## 2020-06-17 RX ADMIN — OXYCODONE HYDROCHLORIDE AND ACETAMINOPHEN 1 TABLET: 5; 325 TABLET ORAL at 08:47

## 2020-06-17 RX ADMIN — SODIUM CHLORIDE: 9 INJECTION, SOLUTION INTRAVENOUS at 08:52

## 2020-06-17 RX ADMIN — PROMETHAZINE HYDROCHLORIDE 12.5 MG: 12.5 TABLET ORAL at 08:48

## 2020-06-17 RX ADMIN — MELATONIN 2000 UNITS: at 08:48

## 2020-06-17 RX ADMIN — ONDANSETRON 4 MG: 2 INJECTION INTRAMUSCULAR; INTRAVENOUS at 01:50

## 2020-06-17 RX ADMIN — ENOXAPARIN SODIUM 40 MG: 40 INJECTION SUBCUTANEOUS at 08:48

## 2020-06-17 RX ADMIN — PROMETHAZINE HYDROCHLORIDE 12.5 MG: 12.5 TABLET ORAL at 19:50

## 2020-06-17 ASSESSMENT — PAIN DESCRIPTION - FREQUENCY
FREQUENCY: CONTINUOUS

## 2020-06-17 ASSESSMENT — PAIN - FUNCTIONAL ASSESSMENT
PAIN_FUNCTIONAL_ASSESSMENT: ACTIVITIES ARE NOT PREVENTED

## 2020-06-17 ASSESSMENT — PAIN DESCRIPTION - ORIENTATION
ORIENTATION: LOWER
ORIENTATION: RIGHT
ORIENTATION: LOWER
ORIENTATION: LOWER

## 2020-06-17 ASSESSMENT — PAIN DESCRIPTION - PROGRESSION
CLINICAL_PROGRESSION: NOT CHANGED
CLINICAL_PROGRESSION: GRADUALLY IMPROVING
CLINICAL_PROGRESSION: NOT CHANGED
CLINICAL_PROGRESSION: NOT CHANGED

## 2020-06-17 ASSESSMENT — PAIN SCALES - GENERAL
PAINLEVEL_OUTOF10: 4
PAINLEVEL_OUTOF10: 5
PAINLEVEL_OUTOF10: 0
PAINLEVEL_OUTOF10: 7
PAINLEVEL_OUTOF10: 3
PAINLEVEL_OUTOF10: 0
PAINLEVEL_OUTOF10: 0

## 2020-06-17 ASSESSMENT — PAIN DESCRIPTION - DESCRIPTORS
DESCRIPTORS: ACHING

## 2020-06-17 ASSESSMENT — PAIN DESCRIPTION - DIRECTION: RADIATING_TOWARDS: MID LOWER ABDOMEN

## 2020-06-17 ASSESSMENT — PAIN DESCRIPTION - ONSET
ONSET: ON-GOING
ONSET: GRADUAL
ONSET: ON-GOING
ONSET: GRADUAL

## 2020-06-17 ASSESSMENT — PAIN DESCRIPTION - PAIN TYPE
TYPE: ACUTE PAIN

## 2020-06-17 ASSESSMENT — PAIN DESCRIPTION - LOCATION
LOCATION: ABDOMEN
LOCATION: FLANK

## 2020-06-17 NOTE — PROGRESS NOTES
Received from ED per stretcher. Awake and alert. Ambulated to bed with steady gait with supervision. VSS. Abd soft. Oriented to room. Will continue to monitor.

## 2020-06-17 NOTE — PROGRESS NOTES
Pt A&Ox 4. Pt ambulates with a steady gait independently in room. Pt complains of pain in the abdomen. PRN medication administered per order. Pt instructed to call for assistance. Will continue to monitor.

## 2020-06-17 NOTE — CONSULTS
LABGLOM >60 06/17/2020     PT/INR:  No results found for: PROTIME, INR  PTT:  No results found for: APTT[APTT    Urinalysis: Lg blood, mod leuks, 21-50 WBC    Urine Culture: pending         Antibiotic Therapy:  Rocephin. Imaging: CT abd/pelvis with contrast 6/16/20  1. There is abnormal enhancement of the right ureter. There is abnormal enhancement of the left mid ureter and left distal ureter. There is some abnormal urinary bladder wall enhancement. Findings can be seen with a cystitis-ureteritis. Impression/Plan: 64 yo F with h/o stones, admitted with UTI and ureteritis  -afebrile  -Cr WNL  -WBC @ 14.7 from 20  -continue abx  -NCCT today to r/o stones  -no plans for cysto today.  May consider after infection clears   -okay for diet today    KHARI Cazares - CNP

## 2020-06-17 NOTE — ED NOTES
Report called to CHERYL Vanegas for ED12 going to [phone]. All questions answered. Patient will be transported on ED stretcher with ED staff escort. Patient room is being cleaned at the time of report, CHERYL Vanegas will call when room is ready.  Tuyet Butler RN  06/16/20 2022

## 2020-06-17 NOTE — PROGRESS NOTES
Recent Labs     06/16/20  1807 06/17/20  0334   WBC 20.0* 14.7*   HGB 12.6 10.7*   HCT 40.8 34.3*    310   MCV 82.9 81.5     Renal:    Recent Labs     06/16/20  1706 06/17/20  0334    143   K 3.9 3.8    110   CO2 24 23   BUN 10 8   CREATININE 0.7 0.6   GLUCOSE 128* 137*   CALCIUM 9.9 8.6   ANIONGAP 12 10     Hepatic:   Recent Labs     06/16/20  1706   AST 21   ALT 11   BILITOT 0.3   BILIDIR <0.2   PROT 8.4*   LABALBU 4.3   ALKPHOS 181*     Troponin: No results for input(s): TROPONINI in the last 72 hours. BNP: No results for input(s): BNP in the last 72 hours. Lipids: No results for input(s): CHOL, HDL in the last 72 hours. Invalid input(s): LDLCALCU, TRIGLYCERIDE  ABGs:  No results for input(s): PHART, OKY0XVL, PO2ART, CMC1NTL, BEART, THGBART, F1ASTEUX, UXG1WMQ in the last 72 hours. INR: No results for input(s): INR in the last 72 hours. Lactate: No results for input(s): LACTATE in the last 72 hours. Cultures:  -----------------------------------------------------------------  RAD:   CT ABDOMEN PELVIS W IV CONTRAST Additional Contrast? None   Final Result   1. There is abnormal enhancement of the right ureter. There is abnormal enhancement of the left mid ureter and left distal ureter. There is some abnormal urinary bladder wall enhancement. Findings can be seen with a cystitis-ureteritis. Assessment/Plan:     63 F with prior history of uncomplicated UTIs presents with dysuria, nausea and low back pain, admitted for treatment of cystitis and right ureteritis. Currently hemodynamically stable, afebrile    Cystitis and right ureteritis  Has leukocytosis and UA suggestive of UTI.  Has lower back pain, will treat as pyelonephritis  - Continue ceftriaxone (day 1), f/u cx and sensitivities  - If she spikes fevers, may need to consider broadening coverage to vancomycin and meropenem and/or repeat imaging based on clinical exam  - PRN phenergan for nausea, will restart diet  -

## 2020-06-17 NOTE — H&P
mouth daily. 1/4/13   Melani Rendon MD       Allergies:  Erythromycin and Sulfa antibiotics    Social History:   TOBACCO:   reports that she has been smoking cigarettes. She has a 15.00 pack-year smoking history. She has never used smokeless tobacco.     ETOH:   reports no history of alcohol use. Patient currently lives at home. Family History:       Problem Relation Age of Onset    Stroke Father     Heart Attack Father 76    Cancer Mother         lymphoma    Heart Attack Mother 61    Stroke Maternal Grandmother        ROS:   Review of Systems   Constitutional: Positive for appetite change and chills. Negative for fever. HENT: Negative for congestion, rhinorrhea, sinus pressure, sinus pain, sneezing and sore throat. Eyes: Negative for photophobia and visual disturbance. Respiratory: Negative for cough, chest tightness and shortness of breath. Cardiovascular: Negative for chest pain, palpitations and leg swelling. Gastrointestinal: Positive for abdominal distention, abdominal pain and nausea. Negative for blood in stool, constipation, diarrhea and vomiting. Genitourinary: Positive for dysuria, flank pain, frequency and pelvic pain. Negative for hematuria and urgency. Musculoskeletal: Positive for back pain. Negative for neck pain and neck stiffness. Skin: Negative for color change, pallor and rash. Neurological: Negative for dizziness, light-headedness, numbness and headaches. All other systems reviewed and are negative. PHYSICAL EXAM:  /68   Pulse 89   Temp 98.5 °F (36.9 °C) (Oral)   Resp 18   SpO2 100%   No results for input(s): POCGLU in the last 72 hours. Physical Exam  Constitutional:       General: She is not in acute distress. Appearance: She is normal weight. She is not ill-appearing, toxic-appearing or diaphoretic. HENT:      Head: Normocephalic and atraumatic. Eyes:      General: No scleral icterus. Right eye: No discharge.          Left eye: No seen with a cystitis-ureteritis. Assessment & Plan:      Acute Cystitis and Ureteritis - UA mod LE, 21-50 WBC, 1+ bacteria. CT abd/pelvis with enhancement of right and left ureters and bladder wall enhancement. Afebrile, leukocytosis of 20. No CVA tenderness but very TTP along right flank and suprapubic areas. - Rocephin  -  mL/hr  - follow up urine cultures, blood culture  - urology consulted, appreciate recs  - percocet PRN for pain    Vitamin B12 and Vit D Deficiency   - continue home meds    Code Status: FULL CODE  F/E/N: General Diet,  mL/hr   GI / DVT Prophylaxis:  Lovenox   Disposition:  GMF      I will discuss the patient with Dr. Jessica Nunez.     Holly Mcmullen MD,   Internal Medicine Resident, PGY-2

## 2020-06-17 NOTE — CARE COORDINATION
Case Management Assessment           Initial Evaluation                Date / Time of Evaluation: 6/17/2020 2:26 PM                 Assessment Completed by: Jacqueline Dan    Patient Name: Nicolette Corley     YOB: 1955  Diagnosis: Cystitis [N30.90]  Cystitis [N30.90]  UTI (urinary tract infection) [N39.0]     Date / Time: 6/16/2020  4:45 PM    Patient Admission Status: Inpatient    If patient is discharged prior to next notation, then this note serves as note for discharge by case management. Current PCP: Maciej Sunshine MD  Clinic Patient: No    Chart Reviewed: Yes  Patient/ Family Interviewed: Yes    Initial assessment completed at bedside with: patient    Hospitalization in the last 30 days: No    Emergency Contacts:  Extended Emergency Contact Information  Primary Emergency Contact: Fletcher Beasley [address] [place] [address] Phone: [phone]  Relation: Child  Secondary Emergency Contact: Tuyet Wise  [place] Phone: [phone]  Relation: Child    Advance Directives:   Code Status: Full [address]: No      Copy present: No     In paper Chart: No    Scanned into EMR No    Financial  Payor: Suad Fuchs / Plan: Idris Dumont PPO / Product Type: *No Product type* /     Pre-cert required for SNF: Yes    Pharmacy    [address] - [place], [address] [place] [address] [address] [place] [address]  [address]  Phone: [phone] Fax: [phone]      Potential assistance Purchasing Medications: Potential Assistance Purchasing Medications: No  Does Patient want to participate in local refill/ meds to beds program?: No    Meds To Beds General Rules:  1. Can ONLY be done Monday- Friday between 8:30am-5pm  2. Prescription(s) must be in pharmacy by 3pm to be filled same day  3. Copy of patient's insurance/ prescription drug card and patient face sheet must be sent along with the prescription(s)  4.  Cost of Rx Indicated    Freedom of choice list was provided with basic dialogue that supports the patient's individualized plan of care/goals and shares the quality data associated with the providers. Not Indicated    Care Transition patient:  Yes    Irvin Morrell RN  [facility]  Case Management Department  Ph: [phone]

## 2020-06-17 NOTE — PLAN OF CARE
Problem: Falls - Risk of:  Goal: Will remain free from falls  Description: Will remain free from falls  Outcome: Ongoing  Note: At medium risk for falls per [facility] FOR [facility] JITENDRA scale. Oriented x 4. Ambulates to bathroom with steady gait. Needed items in reach. Bed in low locked position. Will continue to monitor and plan of care. Problem: Pain:  Goal: Control of acute pain  Description: Control of acute pain  Outcome: Ongoing  Note: C/o dull pain in lower abd over pelvis. Requested pain pill and given Percocet last evening with satisfactory reduction of pain per pt. Will continue to monitor and plan of care. Problem: Urinary Elimination:  Goal: Signs and symptoms of infection will decrease  Description: Signs and symptoms of infection will decrease  Outcome: Ongoing  Note: C/o lower abd pain over pelvis and  R flank pain. Also c/o pain on urination. Voided clear yellow urine. Urine culture obtained and sent to lab. IVFs as ordered. Urology to see in AM. Made NPO at 2400. Will continue to monitor and plan of care.

## 2020-06-18 VITALS
DIASTOLIC BLOOD PRESSURE: 72 MMHG | OXYGEN SATURATION: 93 % | RESPIRATION RATE: 15 BRPM | WEIGHT: 142.9 LBS | HEIGHT: 63 IN | BODY MASS INDEX: 25.32 KG/M2 | TEMPERATURE: 97.8 F | SYSTOLIC BLOOD PRESSURE: 143 MMHG | HEART RATE: 70 BPM

## 2020-06-18 LAB
ANION GAP SERPL CALCULATED.3IONS-SCNC: 9 MMOL/L (ref 3–16)
BASOPHILS ABSOLUTE: 0.1 K/UL (ref 0–0.2)
BASOPHILS RELATIVE PERCENT: 1.1 %
BUN BLDV-MCNC: 7 MG/DL (ref 7–20)
CALCIUM SERPL-MCNC: 9.1 MG/DL (ref 8.3–10.6)
CHLORIDE BLD-SCNC: 108 MMOL/L (ref 99–110)
CO2: 24 MMOL/L (ref 21–32)
CREAT SERPL-MCNC: 0.7 MG/DL (ref 0.6–1.2)
EOSINOPHILS ABSOLUTE: 0.4 K/UL (ref 0–0.6)
EOSINOPHILS RELATIVE PERCENT: 3.5 %
GFR AFRICAN AMERICAN: >60
GFR NON-AFRICAN AMERICAN: >60
GLUCOSE BLD-MCNC: 111 MG/DL (ref 70–99)
HCT VFR BLD CALC: 33.7 % (ref 36–48)
HEMOGLOBIN: 10.6 G/DL (ref 12–16)
LYMPHOCYTES ABSOLUTE: 3.5 K/UL (ref 1–5.1)
LYMPHOCYTES RELATIVE PERCENT: 31.1 %
MCH RBC QN AUTO: 25.5 PG (ref 26–34)
MCHC RBC AUTO-ENTMCNC: 31.4 G/DL (ref 31–36)
MCV RBC AUTO: 81.2 FL (ref 80–100)
MONOCYTES ABSOLUTE: 1.1 K/UL (ref 0–1.3)
MONOCYTES RELATIVE PERCENT: 9.7 %
NEUTROPHILS ABSOLUTE: 6.1 K/UL (ref 1.7–7.7)
NEUTROPHILS RELATIVE PERCENT: 54.6 %
PDW BLD-RTO: 15.5 % (ref 12.4–15.4)
PLATELET # BLD: 324 K/UL (ref 135–450)
PMV BLD AUTO: 9.2 FL (ref 5–10.5)
POTASSIUM REFLEX MAGNESIUM: 4 MMOL/L (ref 3.5–5.1)
RBC # BLD: 4.16 M/UL (ref 4–5.2)
REASON FOR REJECTION: NORMAL
REJECTED TEST: NORMAL
SODIUM BLD-SCNC: 141 MMOL/L (ref 136–145)
WBC # BLD: 11.2 K/UL (ref 4–11)

## 2020-06-18 PROCEDURE — 6370000000 HC RX 637 (ALT 250 FOR IP): Performed by: STUDENT IN AN ORGANIZED HEALTH CARE EDUCATION/TRAINING PROGRAM

## 2020-06-18 PROCEDURE — 2580000003 HC RX 258: Performed by: STUDENT IN AN ORGANIZED HEALTH CARE EDUCATION/TRAINING PROGRAM

## 2020-06-18 PROCEDURE — 85025 COMPLETE CBC W/AUTO DIFF WBC: CPT

## 2020-06-18 PROCEDURE — 6360000002 HC RX W HCPCS: Performed by: STUDENT IN AN ORGANIZED HEALTH CARE EDUCATION/TRAINING PROGRAM

## 2020-06-18 PROCEDURE — 99238 HOSP IP/OBS DSCHRG MGMT 30/<: CPT | Performed by: INTERNAL MEDICINE

## 2020-06-18 PROCEDURE — 36415 COLL VENOUS BLD VENIPUNCTURE: CPT

## 2020-06-18 PROCEDURE — 80048 BASIC METABOLIC PNL TOTAL CA: CPT

## 2020-06-18 RX ORDER — OXYCODONE HYDROCHLORIDE AND ACETAMINOPHEN 5; 325 MG/1; MG/1
1 TABLET ORAL EVERY 6 HOURS PRN
Qty: 28 TABLET | Refills: 0 | Status: SHIPPED | OUTPATIENT
Start: 2020-06-18 | End: 2020-06-25

## 2020-06-18 RX ORDER — CIPROFLOXACIN 500 MG/1
500 TABLET, FILM COATED ORAL 2 TIMES DAILY
Qty: 18 TABLET | Refills: 0 | Status: SHIPPED | OUTPATIENT
Start: 2020-06-18 | End: 2020-06-27

## 2020-06-18 RX ORDER — OXYCODONE HYDROCHLORIDE AND ACETAMINOPHEN 5; 325 MG/1; MG/1
1 TABLET ORAL EVERY 6 HOURS PRN
Status: DISCONTINUED | OUTPATIENT
Start: 2020-06-18 | End: 2020-06-18 | Stop reason: HOSPADM

## 2020-06-18 RX ADMIN — PROMETHAZINE HYDROCHLORIDE 12.5 MG: 12.5 TABLET ORAL at 08:03

## 2020-06-18 RX ADMIN — ACETAMINOPHEN 650 MG: 325 TABLET ORAL at 10:49

## 2020-06-18 RX ADMIN — MELATONIN 2000 UNITS: at 08:03

## 2020-06-18 RX ADMIN — OXYCODONE 5 MG: 5 TABLET ORAL at 08:03

## 2020-06-18 RX ADMIN — ENOXAPARIN SODIUM 40 MG: 40 INJECTION SUBCUTANEOUS at 08:03

## 2020-06-18 RX ADMIN — SODIUM CHLORIDE: 9 INJECTION, SOLUTION INTRAVENOUS at 06:07

## 2020-06-18 ASSESSMENT — PAIN DESCRIPTION - PROGRESSION
CLINICAL_PROGRESSION: NOT CHANGED

## 2020-06-18 ASSESSMENT — PAIN DESCRIPTION - ONSET
ONSET: ON-GOING

## 2020-06-18 ASSESSMENT — PAIN DESCRIPTION - DESCRIPTORS
DESCRIPTORS: ACHING

## 2020-06-18 ASSESSMENT — PAIN - FUNCTIONAL ASSESSMENT
PAIN_FUNCTIONAL_ASSESSMENT: ACTIVITIES ARE NOT PREVENTED

## 2020-06-18 ASSESSMENT — PAIN SCALES - GENERAL
PAINLEVEL_OUTOF10: 3
PAINLEVEL_OUTOF10: 2
PAINLEVEL_OUTOF10: 8
PAINLEVEL_OUTOF10: 3
PAINLEVEL_OUTOF10: 3

## 2020-06-18 ASSESSMENT — PAIN DESCRIPTION - FREQUENCY
FREQUENCY: CONTINUOUS

## 2020-06-18 ASSESSMENT — PAIN DESCRIPTION - PAIN TYPE
TYPE: ACUTE PAIN

## 2020-06-18 ASSESSMENT — PAIN DESCRIPTION - ORIENTATION
ORIENTATION: LOWER

## 2020-06-18 ASSESSMENT — PAIN DESCRIPTION - LOCATION
LOCATION: ABDOMEN

## 2020-06-18 NOTE — DISCHARGE SUMMARY
respiratory distress. Breath sounds: Normal breath sounds. No wheezing, rhonchi or rales. Abdominal:      General: Bowel sounds are normal. There is no distension. Palpations: Abdomen is soft. Tenderness: There is abdominal tenderness. There is no guarding or rebound. Comments: No CVA tenderness noted  Has right lower back and suprapubic tenderness   Musculoskeletal:      Right lower leg: No edema. Left lower leg: No edema. Consults: urology  Significant Diagnostic Studies:    CT abdomen pelvis w IV contrast 6/16/20  There is abnormal enhancement of the right ureter. There is abnormal enhancement of the left mid ureter and left distal ureter. There is some abnormal urinary bladder wall enhancement. Findings can be seen with a cystitis-ureteritis. CT abdomen pelvis wo contrast 6/17/20  1.  No significant change from the study of the prior day. 2. Contrast noted within the bilateral renal collecting systems, ureters and bladder. 3. No discrete nephrolithiasis, although the presence of contrast in the collecting system is a limiting factor. See the prior days report for those findings.      Treatments: IV hydration, antibiotics  Disposition: home  Discharged Condition: Stable  Follow Up: Primary Care Physician in one week    DISCHARGE MEDICATION:     Medication List      ASK your doctor about these medications    cyanocobalamin 1000 MCG/ML injection  INJECT 1 ML INTO THE MUSCLE EVERY 30 DAYS     SYRINGE 3CC/25GX1\" 25G X 1\" 3 ML Misc  1 each by Does not apply route every 30 days     vitamin D 50 MCG (2000 UT) Caps capsule          Activity: activity as tolerated  Diet: regular diet    Time Spent on discharge is more than 30 minutes    Signed:  Jaleesa Charles MD, PGY-1  6/18/2020   Patient had pyelonephritis without sepsis

## 2020-06-18 NOTE — PLAN OF CARE
Problem: Falls - Risk of:  Goal: Will remain free from falls  Description: Will remain free from falls  6/18/2020 0206 by Loly Ohara RN  Outcome: Ongoing  Note: Pt up ad laverne and steady on feet. Witnessed up and steady by this RN. Pt instructed to call this RN for needs. Pt verbalized understanding of all instructions. Will continue to monitor safety. Problem: Pain:  Goal: Pain level will decrease  Description: Pain level will decrease  6/17/2020 1337 by Allie Phoenix RN  Outcome: Ongoing  Note: Pt c/o pain in the suprapubic area. PRN Percocet administered per MAR. Will continue to monitor. Problem: Pain:  Goal: Control of acute pain  Description: Control of acute pain  Outcome: Ongoing  Note: Pt given oxycodone per orders for R flank pain. Pt sleeping afterward. No complaints since. Pt requested phenergan with pain medication d/t causes nausea. Pt instructed to call for needs and discomfort. Pt verbalized understanding of all instructions. Will continue to monitor comfort.

## 2020-06-18 NOTE — DISCHARGE INSTR - COC
Continuity of Care Form    Patient Name: Brad Lamb   :  1955  MRN:  [id number]    Admit date:  2020  Discharge date:  ***    Code Status Order: Full Code   Advance Directives:   [facility] Documentation     Date/Time Healthcare Directive Type of Healthcare Directive Copy in [facility] [address] Agent's Name Healthcare Agent's Phone Number    20 2114  No, patient does not have an advance directive for healthcare treatment -- -- -- -- --          Admitting Physician:  Conor Dickson MD  PCP: Conor Dickson MD    Discharging Nurse: [facility] Unit/Room#: 6397/8839-73  Discharging Unit Phone Number: ***    Emergency Contact:   Extended Emergency Contact Information  Primary Emergency Contact: Fletcher Cunningham [address] [place] [address] Phone: [phone]  Relation: Child  Secondary Emergency Contact: Tuyet Wise  [place] Phone: [phone]  Relation: Child    Past Surgical History:  Past Surgical History:   Procedure Laterality Date    SPLENECTOMY      H/O    TYMPANOSTOMY TUBE PLACEMENT         Immunization History:   Immunization History   Administered Date(s) Administered    Influenza Vaccine, unspecified formulation 10/22/2017    Influenza Whole 10/10/2008    Influenza, Quadv, IM, PF (6 mo and older Fluzone, Flulaval, Fluarix, and 3 yrs and older Afluria) 2019    Pneumococcal Conjugate 13-valent (Uoimxbe46) 2016    Pneumococcal Polysaccharide (Loopfobif83) 2015    Zoster Live (Zostavax) 2017    Zoster Recombinant (Shingrix) 2018       Active Problems:  Patient Active Problem List   Diagnosis Code    Vitamin D deficiency E55.9    B12 deficiency E53.8    H/O splenectomy Z90.81    Lumbar disc disease M51.9    Breast cyst N60.09    Essential hypertension I10    Smoker F17.200    Other headache syndrome G44.89    Increased pressure in the eye, right H40.051    Cystitis N30.90    UTI (urinary tract infection) N39.0    Nephrolithiasis N20.0       Isolation/Infection:   Isolation          No Isolation        Patient Infection Status     None to display          Nurse Assessment:  Last Vital Signs: BP (!) 143/72   Pulse 70   Temp 97.8 °F (36.6 °C) (Oral)   Resp 15   Ht 5' 3\" (1.6 m)   Wt 142 lb 14.4 oz (64.8 kg)   SpO2 93%   BMI 25.31 kg/m²     Last documented pain score (0-10 scale): Pain Level: 3  Last Weight:   Wt Readings from Last 1 Encounters:   20 142 lb 14.4 oz (64.8 kg)     Mental Status:  {IP PT MENTAL STATUS:}    IV Access:  { YAMILE IV ACCESS:471683053}    Nursing Mobility/ADLs:  Walking   {P DME EYVD:337282928}  Transfer  {P DME VTAV:044107919}  Bathing  {P DME YRXA:307080119}  Dressing  {P DME ROL}  Toileting  {P DME ZACO:175859276}  Feeding  {[facility] DME ZYNP:987388267}  Med Admin  {[facility] DME EAKU:720984803}  Med Delivery   { YAMILE MED Delivery:456934307}    Wound Care Documentation and Therapy:        Elimination:  Continence:   · Bowel: {YES / PW:29320}  · Bladder: {YES / BK:87439}  Urinary Catheter: {Urinary Catheter:556557853}   Colostomy/Ileostomy/Ileal Conduit: {YES / PM:73318}       Date of Last BM: ***    Intake/Output Summary (Last 24 hours) at 2020 1514  Last data filed at 2020 1400  Gross per 24 hour   Intake 2810 ml   Output 3175 ml   Net -365 ml     I/O last 3 completed shifts: In: 3170 [P.O.:1020;  I.V.:2100; IV Piggyback:50]  Out: 9191 [Urine:3175]    Safety Concerns:     [address] Safety Concerns:474004646}    Impairments/Disabilities:      [address] Impairments/Disabilities:449982720}    Nutrition Therapy:  Current Nutrition Therapy:   [address] [employer] Diet List:670562746}    Routes of Feeding: {CHP DME Other Feedings:817380387}  Liquids: {Slp liquid thickness:65808}  Daily Fluid Restriction: {CHP DME Yes amt example:148018137}  Last Modified Barium Swallow with Video (Video Swallowing Test): {Done Not Done

## 2020-06-18 NOTE — CARE COORDINATION
Case Management Assessment            Discharge Note                    Date / Time of Note: 6/18/2020 12:58 PM                  Discharge Note Completed by: Jaylen Edge    Patient Name: Emilie Rudolph   YOB: 1955  Diagnosis: Cystitis [N30.90]  Cystitis [N30.90]  UTI (urinary tract infection) [N39.0]   Date / Time: 6/16/2020  4:45 PM    Current PCP: Cr Gamble MD  Clinic patient: No    Hospitalization in the last 30 days: No    Advance Directives:  Code Status: Full Code  [place] DNR form completed and on chart: No    Financial:  Payor: Shannon Garcia / Plan: Willeen Dancer PPO / Product Type: *No Product type* /      Pharmacy:    [facility] [id number] - [place], [address] [place] [address] [address] [place] [address]  [address]  Phone: [phone] Fax: [phone]      Assistance purchasing medications?: Potential Assistance Purchasing Medications: No  Assistance provided by Case Management: None at this time    Does patient want to participate in local refill/ meds to beds program?: No    Meds To Beds General Rules:  1. Can ONLY be done Monday- Friday between 8:30am-5pm  2. Prescription(s) must be in pharmacy by 3pm to be filled same day  3. Copy of patient's insurance/ prescription drug card and patient face sheet must be sent along with the prescription(s)  4. Cost of Rx cannot be added to hospital bill. If financial assistance is needed, please contact unit  or ;  or  CANNOT provide pharmacy voucher for patients co-pays  5.  Patients can then  the prescription on their way out of the hospital at discharge, or pharmacy can deliver to the bedside if staff is available. (payment due at time of pick-up or delivery - cash, check, or card accepted)     Able to afford home medications/ co-pay costs: Yes    ADLS:  Current PT AM-PAC Score:   /24  Current OT AM-PAC Score:   /24      DISCHARGE Disposition: Home- No Services Needed    LOC at discharge: Not Applicable  YAMILE Completed: No    Notification completed in HENS/PAS?:  Not Applicable    IMM Completed:   Not Indicated    Transportation:  Transportation PLAN for discharge: family   Mode of Transport: Private Car  Reason for medical transport: Not Applicable  Name of [facility]: Not Applicable  Time of Transport: when family available    Transport form completed: No    Home Care:  [facility] ordered at discharge: No  [address]: Not Applicable  Orders faxed: No    Durable Medical Equipment:  DME Provider: NA  Equipment obtained during hospitalization: NA    Home Oxygen and Respiratory Equipment:  Oxygen needed at discharge?: No    Dialysis:  Dialysis patient: No    Dialysis Center:  Not Applicable    Hospice Services:  Location: Not Applicable  Agency: Not Applicable    Consents signed: No    Referrals [facility] at [facility] for outpatient continued care:  Not Applicable    Additional CM Notes:   CM confirmed  D/c home w/  PO atbx  And  Out patient follow up w/ PCP and  Urology : possible cysto out pt  After  Round of  atbx  Completed  :    Rx;    these medications from any pharmacy with your printed prescription   1 ciprofloxacin   2 oxyCODONE-acetaminophen      The Plan for Transition of Care is related to the following treatment goals of Cystitis [N30.90]  Cystitis [N30.90]  UTI (urinary tract infection) [N39.0]    The Patient and/or patient representative Antonino Mccartney and her family were provided with a choice of provider and agrees with the discharge plan Yes    Freedom of choice list was provided with basic dialogue that supports the patient's individualized plan of care/goals and shares the quality data associated with the providers.  Yes    Care Transitions patient: No    Alex Martin RN  [email or web address]  Case Management Department  Ph: [phone]

## 2020-06-18 NOTE — PROGRESS NOTES
Urology Attending Progress Note      Subjective: c/o bladder pain. Vitals:  BP (!) 158/78   Pulse 70   Temp 98.2 °F (36.8 °C) (Oral)   Resp 16   Ht 5' 3\" (1.6 m)   Wt 142 lb 14.4 oz (64.8 kg)   SpO2 95%   BMI 25.31 kg/m²   Temp  Av.8 °F (36.6 °C)  Min: 97.1 °F (36.2 °C)  Max: 98.4 °F (36.9 °C)    Intake/Output Summary (Last 24 hours) at 2020 1010  Last data filed at 2020 0755  Gross per 24 hour   Intake 2750 ml   Output 2775 ml   Net -25 ml       Exam: abd soft. Voiding clear urine     Labs:  WBC:    Lab Results   Component Value Date    WBC 14.7 2020     Hemoglobin/Hematocrit:    Lab Results   Component Value Date    HGB 10.7 2020    HCT 34.3 2020     BMP:    Lab Results   Component Value Date     2020    K 4.0 2020     2020    CO2 24 2020    BUN 7 2020    LABALBU 4.3 2020    CREATININE 0.7 2020    CALCIUM 9.1 2020    GFRAA >60 2020    GFRAA >60 2013    LABGLOM >60 2020     PT/INR:  No results found for: PROTIME, INR  PTT:  No results found for: APTT[APTT        Urine Culture:  NGTD    Blood Culture:  NGTD    Antibiotic Therapy:  Rocephin. Imaging: CT abd/pelvis w/o 20  1.  No significant change from the study of the prior day. 2. Contrast noted within the bilateral renal collecting systems, ureters and bladder. 3. No discrete nephrolithiasis, although the presence of contrast in the collecting system is a limiting factor. See the prior days report for those findings. CT abd/pelvis w/ 20  CT abd/pelvis with contrast 20  1. There is abnormal enhancement of the right ureter. There is abnormal enhancement of the left mid ureter and left distal ureter. There is some abnormal urinary bladder wall enhancement.  Findings can be seen with a cystitis-ureteritis.         Impression/Plan: 64 yo F with h/o stones, admitted with UTI and possible pyelo  -afebrile  -Cr WNL  -continue abx  -may consider cysto, bilateral retrogrades as outpatient after a course of abx     Iliana Neves, APRN - CNP

## 2020-06-21 LAB
BLOOD CULTURE, ROUTINE: NORMAL
CULTURE, BLOOD 2: NORMAL

## 2020-06-22 ENCOUNTER — TELEPHONE (OUTPATIENT)
Dept: INTERNAL MEDICINE CLINIC | Age: 65
End: 2020-06-22

## 2020-06-23 ENCOUNTER — OFFICE VISIT (OUTPATIENT)
Dept: INTERNAL MEDICINE CLINIC | Age: 65
End: 2020-06-23
Payer: COMMERCIAL

## 2020-06-23 VITALS
BODY MASS INDEX: 24.63 KG/M2 | TEMPERATURE: 98 F | SYSTOLIC BLOOD PRESSURE: 150 MMHG | HEIGHT: 63 IN | WEIGHT: 139 LBS | DIASTOLIC BLOOD PRESSURE: 89 MMHG

## 2020-06-23 PROBLEM — A04.72 C. DIFFICILE DIARRHEA: Status: RESOLVED | Noted: 2020-06-23 | Resolved: 2020-06-23

## 2020-06-23 PROBLEM — A04.72 C. DIFFICILE DIARRHEA: Status: ACTIVE | Noted: 2020-06-23

## 2020-06-23 LAB
BILIRUBIN, POC: ABNORMAL
BLOOD URINE, POC: ABNORMAL
CLARITY, POC: CLEAR
COLOR, POC: YELLOW
GLUCOSE URINE, POC: ABNORMAL
KETONES, POC: ABNORMAL
LEUKOCYTE EST, POC: ABNORMAL
NITRITE, POC: ABNORMAL
PH, POC: 5
PROTEIN, POC: ABNORMAL
SPECIFIC GRAVITY, POC: 1.03
UROBILINOGEN, POC: 2

## 2020-06-23 PROCEDURE — 81002 URINALYSIS NONAUTO W/O SCOPE: CPT | Performed by: INTERNAL MEDICINE

## 2020-06-23 PROCEDURE — 99213 OFFICE O/P EST LOW 20 MIN: CPT | Performed by: INTERNAL MEDICINE

## 2020-06-23 PROCEDURE — 1111F DSCHRG MED/CURRENT MED MERGE: CPT | Performed by: INTERNAL MEDICINE

## 2020-06-23 RX ORDER — DICLOFENAC SODIUM 75 MG/1
75 TABLET, DELAYED RELEASE ORAL 2 TIMES DAILY
Qty: 30 TABLET | Refills: 0 | Status: SHIPPED | OUTPATIENT
Start: 2020-06-23 | End: 2020-11-24

## 2020-06-29 ENCOUNTER — TELEPHONE (OUTPATIENT)
Dept: INTERNAL MEDICINE CLINIC | Age: 65
End: 2020-06-29

## 2020-06-29 RX ORDER — PHENAZOPYRIDINE HYDROCHLORIDE 200 MG/1
200 TABLET, FILM COATED ORAL 3 TIMES DAILY PRN
Qty: 21 TABLET | Refills: 0 | Status: SHIPPED | OUTPATIENT
Start: 2020-06-29 | End: 2020-07-06

## 2020-06-29 NOTE — TELEPHONE ENCOUNTER
Patient states she is still having pelvic pain and would like to speak with Dr. Cristina Moreno as soon as possible. Please advise.

## 2020-07-16 PROBLEM — N39.0 UTI (URINARY TRACT INFECTION): Status: RESOLVED | Noted: 2020-06-16 | Resolved: 2020-07-16

## 2020-11-24 ENCOUNTER — OFFICE VISIT (OUTPATIENT)
Dept: INTERNAL MEDICINE CLINIC | Age: 65
End: 2020-11-24
Payer: COMMERCIAL

## 2020-11-24 VITALS
BODY MASS INDEX: 24.45 KG/M2 | HEIGHT: 63 IN | TEMPERATURE: 97.3 F | SYSTOLIC BLOOD PRESSURE: 140 MMHG | DIASTOLIC BLOOD PRESSURE: 80 MMHG | WEIGHT: 138 LBS

## 2020-11-24 PROCEDURE — 90715 TDAP VACCINE 7 YRS/> IM: CPT | Performed by: INTERNAL MEDICINE

## 2020-11-24 PROCEDURE — 90471 IMMUNIZATION ADMIN: CPT | Performed by: INTERNAL MEDICINE

## 2020-11-24 PROCEDURE — 99396 PREV VISIT EST AGE 40-64: CPT | Performed by: INTERNAL MEDICINE

## 2020-11-24 ASSESSMENT — PATIENT HEALTH QUESTIONNAIRE - PHQ9
1. LITTLE INTEREST OR PLEASURE IN DOING THINGS: 0
2. FEELING DOWN, DEPRESSED OR HOPELESS: 0
SUM OF ALL RESPONSES TO PHQ QUESTIONS 1-9: 0
SUM OF ALL RESPONSES TO PHQ9 QUESTIONS 1 & 2: 0
SUM OF ALL RESPONSES TO PHQ QUESTIONS 1-9: 0
SUM OF ALL RESPONSES TO PHQ QUESTIONS 1-9: 0

## 2020-11-24 NOTE — PROGRESS NOTES
Diagnosis Date    Asthma     Breast cyst 2014    H/O splenectomy 2012    Lumbar disc disease 2014    Other B-complex deficiencies     Pneumonia 11/15/2014    Thoracic or lumbosacral neuritis or radiculitis, unspecified     Vitamin D deficiency 2015       Past Surgical History:        Procedure Laterality Date    SPLENECTOMY      H/O    TYMPANOSTOMY TUBE PLACEMENT         Family History:  Family History   Problem Relation Age of Onset    Stroke Father     Heart Attack Father 76    Cancer Mother         lymphoma    Heart Attack Mother 61    Stroke Maternal Grandmother        Social History:  Social History     Socioeconomic History    Marital status:      Spouse name: None    Number of children: 3    Years of education: None    Highest education level: None   Occupational History    None   Social Needs    Financial resource strain: Not hard at all   [place] insecurity     Worry: Never true     Inability: Never true    Transportation needs     Medical: No     Non-medical: No   Tobacco Use    Smoking status: Current Every Day Smoker     Packs/day: 0.50     Years: 30.00     Pack years: 15.00     Types: Cigarettes     Last attempt to quit: 2016     Years since quittin.9    Smokeless tobacco: Never Used   Substance and Sexual Activity    Alcohol use: No     Alcohol/week: 0.0 standard drinks    Drug use: No    Sexual activity: None   Lifestyle    Physical activity     Days per week: None     Minutes per session: None    Stress: None   Relationships    Social connections     Talks on phone: None     Gets together: None     Attends [employer]ism service: None     Active member of club or organization: None     Attends meetings of clubs or organizations: None     Relationship status: None    Intimate partner violence     Fear of current or ex partner: None     Emotionally abused: None     Physically abused: None     Forced sexual activity: None   Other Topics Concern    None   Social History Narrative    None         Allergies:  Erythromycin and Sulfa antibiotics    Current Medications:    Prior to Admission medications    Medication Sig Start Date End Date Taking? Authorizing Provider   cyanocobalamin 1000 MCG/ML injection INJECT 1 ML INTO THE MUSCLE EVERY 30 DAYS 2/24/20  Yes Krishan Hurtado MD   Cholecalciferol (VITAMIN D) 2000 UNITS CAPS capsule Take 1 capsule by mouth daily. 1/4/13  Yes Rubens Bloom MD   Syringe/Needle, Disp, (SYRINGE 3CC/25GX1\") 25G X 1\" 3 ML MISC 1 each by Does not apply route every 30 days 2/24/20   Krishan Hurtado MD           Physical Exam:      Constitutional:  Well developed, well nourished, no acute distress, non-toxic appearance   Eyes:  PERRL, conjunctiva normal   HENT:  Atraumatic, external ears normal, nose normal, oropharynx moist, no pharyngeal exudates. Neck- normal range of motion, no tenderness, supple   Respiratory:  No respiratory distress, normal breath sounds, no rales, no wheezing   Cardiovascular:  Normal rate, normal rhythm, no murmurs, no gallops, no rubs   GI:  Soft, nondistended, normal bowel sounds, nontender, no organomegaly, no mass, no rebound, no guarding   :  No costovertebral angle tenderness   Musculoskeletal:  No edema, no tenderness, no deformities. Back- no tenderness  Integument:  Well hydrated, no rash   Lymphatic:  No lymphadenopathy noted   Neurologic:  Alert & oriented x 3, CN 2-12 normal, normal motor function, normal sensory function, no focal deficits noted   Psychiatric:  Speech and behavior appropriate       Vitals: BP (!) 140/80   Temp 97.3 °F (36.3 °C) (Temporal)   Ht 5' 3\" (1.6 m)   Wt 138 lb (62.6 kg)   BMI 24.45 kg/m²     Body mass index is 24.45 kg/m².   Wt Readings from Last 3 Encounters:   11/24/20 138 lb (62.6 kg)   06/23/20 139 lb (63 kg)   06/17/20 142 lb 14.4 oz (64.8 kg)         LABS:    CBC:   Lab Results   Component Value Date    WBC 11.2 (H) 06/18/2020    HGB 10.6 (L) 06/18/2020    HCT 33.7 (L) 06/18/2020    MCV 81.2 06/18/2020     06/18/2020           Lab Results   Component Value Date    IRON 40 03/26/2019    TIBC 342 03/26/2019    FOLATE 9.53 06/01/2012    HLWABDUQ87 516 11/18/2019                                                             BMP:    Lab Results   Component Value Date     06/18/2020    K 4.0 06/18/2020     06/18/2020    CO2 24 06/18/2020       LFT's:   Lab Results   Component Value Date    ALT 11 06/16/2020    AST 21 06/16/2020    GGT 23 06/01/2012    ALKPHOS 181 (H) 06/16/2020    BILITOT 0.3 06/16/2020       Lipids:   Lab Results   Component Value Date    CHOL 173 11/18/2019    HDL 42 11/18/2019    LDLCALC 113 (H) 11/18/2019    TRIG 90 11/18/2019       INR: No results found for: INR, PROTIME    U/A:  Lab Results   Component Value Date    LABMICR YES 06/16/2020        No results found for: LABA1C     Lab Results   Component Value Date    CREATININE 0.7 06/18/2020       -----------------------------------------------------------------     Assessment/Plan:   1. PE (physical exam), annual  Check screening labs Tdap given up-to-date on all her other immunizations continue diet and exercise  - CBC Auto Differential; Future  - Comprehensive Metabolic Panel; Future  - Lipid Panel; Future  - TSH without Reflex; Future  - Urinalysis; Future  - VITAMIN D 25 HYDROXY; Future    2. Vitamin D deficiency  Check above labs    3. Essential hypertension  Pressure borderline elevated she is check blood pressure 3 times at home if it remains elevated will place on a diuretic    4. B12 deficiency  Above labs    5. H/O splenectomy  This problem is stable continue to monitor    6.  Nephrolithiasis  Problem is stable continue increase fluid uptake

## 2021-06-12 DIAGNOSIS — E53.8 B12 DEFICIENCY: ICD-10-CM

## 2021-06-16 RX ORDER — CYANOCOBALAMIN 1000 UG/ML
INJECTION INTRAMUSCULAR; SUBCUTANEOUS
Qty: 10 ML | Refills: 3 | Status: SHIPPED | OUTPATIENT
Start: 2021-06-16

## 2021-09-17 ENCOUNTER — TELEPHONE (OUTPATIENT)
Dept: INTERNAL MEDICINE CLINIC | Age: 66
End: 2021-09-17

## 2021-09-17 RX ORDER — DOXYCYCLINE HYCLATE 100 MG
100 TABLET ORAL 2 TIMES DAILY
Qty: 14 TABLET | Refills: 0 | Status: SHIPPED | OUTPATIENT
Start: 2021-09-17 | End: 2021-09-24

## 2021-09-17 NOTE — TELEPHONE ENCOUNTER
----- Message from Dayanna Merida sent at 9/16/2021  9:50 AM EDT -----  Subject: Appointment Request    Reason for Call: Urgent Cough Cold    QUESTIONS  Type of Appointment? Established Patient  Reason for appointment request? No appointments available during search  Additional Information for Provider? Patient is calling with   cough/cold/chills symptoms. No appointments available. Please call Guilherme Ano  ---------------------------------------------------------------------------  --------------  Giselle Meebo INFO  What is the best way for the office to contact you? OK to leave message on   voicemail  Preferred Call Back Phone Number? [phone]  ---------------------------------------------------------------------------  --------------  SCRIPT ANSWERS  Relationship to Patient? Self  Are you currently unable to finish sentences due to any difficulty   breathing? No  Are you unable to swallow liquids? No  Are you having fevers (100.4 or greater), chills, or sweats? Yes   Have you been diagnosed with, awaiting test results for, or told that you   are suspected of having COVID-19 (Coronavirus)? (If patient has tested   negative or was tested as a requirement for work, school, or travel and   not based on symptoms, answer no)? No  Within the past two weeks have you developed any of the following symptoms   (answer no if symptoms have been present longer than 2 weeks or began   more than 2 weeks ago)? Fever or Chills, Cough, Shortness of breath or   difficulty breathing, Loss of taste or smell, Sore throat, Nasal   congestion, Sneezing or runny nose, Fatigue or generalized body aches   (answer no if pain is specific to a body part e.g. back pain), Diarrhea,   Headache?  Yes

## 2021-10-19 ENCOUNTER — NURSE TRIAGE (OUTPATIENT)
Dept: OTHER | Facility: CLINIC | Age: 66
End: 2021-10-19

## 2021-10-19 NOTE — TELEPHONE ENCOUNTER
Received call from Eh Osullivan at [facility] with Red Flag Complaint. Brief description of triage:  Patient calling for concerns for ongoing chest pain since Wednesday. Triage indicates for patient to go to [address] [place], [place] [address]r [facility] now (or to Office with PCP Approval):  2nd level triage completed with Dr Leonela Grande via Ed Hussein MA and Charo Wilkes; provider recommends patient be seen in office tomorrow. Writer provided warm transfer to [facility] for appointment scheduling. Attention Provider: Thank you for allowing me to participate in the care of your patient. The patient was connected to triage in response to information provided to the ECC/PSC. Please do not respond through this encounter as the response is not directed to a shared pool. Reason for Disposition   Chest pain lasting longer than 5 minutes and occurred in last 3 days (72 hours) (Exception: feels exactly the same as previously diagnosed heartburn and has accompanying sour taste in mouth)    Answer Assessment - Initial Assessment Questions  1. LOCATION: \"Where does it hurt? \"        Above left breast    2. RADIATION: \"Does the pain go anywhere else? \" (e.g., into neck, jaw, arms, back)      Over into right side and across back    3. ONSET: \"When did the chest pain begin? \" (Minutes, hours or days)       Last Wednesday    4. PATTERN \"Does the pain come and go, or has it been constant since it started? \"  \"Does it get worse with exertion? \"       Comes and goes with intensity, not worse with exertion, hurts when laying down towards the end of the day    5. DURATION: \"How long does it last\" (e.g., seconds, minutes, hours)      Ongoing    6. SEVERITY: \"How bad is the pain? \"  (e.g., Scale 1-10; mild, moderate, or severe)     - MILD (1-3): doesn't interfere with normal activities      - MODERATE (4-7): interferes with normal activities or awakens from sleep     - SEVERE (8-10): excruciating pain, unable to do any normal activities        Yes, 7/10,

## 2021-10-20 ENCOUNTER — OFFICE VISIT (OUTPATIENT)
Dept: INTERNAL MEDICINE CLINIC | Age: 66
End: 2021-10-20
Payer: COMMERCIAL

## 2021-10-20 VITALS
TEMPERATURE: 98.1 F | SYSTOLIC BLOOD PRESSURE: 140 MMHG | HEART RATE: 79 BPM | OXYGEN SATURATION: 97 % | BODY MASS INDEX: 24.13 KG/M2 | HEIGHT: 63 IN | WEIGHT: 136.2 LBS | DIASTOLIC BLOOD PRESSURE: 78 MMHG

## 2021-10-20 DIAGNOSIS — R07.9 CHEST PAIN, UNSPECIFIED TYPE: Primary | ICD-10-CM

## 2021-10-20 PROCEDURE — 93000 ELECTROCARDIOGRAM COMPLETE: CPT | Performed by: HOSPITALIST

## 2021-10-20 PROCEDURE — 99214 OFFICE O/P EST MOD 30 MIN: CPT | Performed by: HOSPITALIST

## 2021-10-20 RX ORDER — DOBUTAMINE HYDROCHLORIDE 200 MG/100ML
5 INJECTION INTRAVENOUS CONTINUOUS
Status: CANCELLED | OUTPATIENT
Start: 2021-10-20

## 2021-10-20 SDOH — ECONOMIC STABILITY: FOOD INSECURITY: WITHIN THE PAST 12 MONTHS, THE FOOD YOU BOUGHT JUST DIDN'T LAST AND YOU DIDN'T HAVE MONEY TO GET MORE.: NEVER TRUE

## 2021-10-20 SDOH — ECONOMIC STABILITY: FOOD INSECURITY: WITHIN THE PAST 12 MONTHS, YOU WORRIED THAT YOUR FOOD WOULD RUN OUT BEFORE YOU GOT MONEY TO BUY MORE.: NEVER TRUE

## 2021-10-20 ASSESSMENT — PATIENT HEALTH QUESTIONNAIRE - PHQ9
1. LITTLE INTEREST OR PLEASURE IN DOING THINGS: 0
SUM OF ALL RESPONSES TO PHQ QUESTIONS 1-9: 0
SUM OF ALL RESPONSES TO PHQ QUESTIONS 1-9: 0
2. FEELING DOWN, DEPRESSED OR HOPELESS: 0
SUM OF ALL RESPONSES TO PHQ9 QUESTIONS 1 & 2: 0
SUM OF ALL RESPONSES TO PHQ QUESTIONS 1-9: 0

## 2021-10-20 ASSESSMENT — ENCOUNTER SYMPTOMS
SHORTNESS OF BREATH: 0
TROUBLE SWALLOWING: 0
VOMITING: 0
WHEEZING: 0
COUGH: 0
BACK PAIN: 0
SINUS PRESSURE: 0
SORE THROAT: 0
VOICE CHANGE: 0
ABDOMINAL DISTENTION: 0
BLOOD IN STOOL: 0
CHEST TIGHTNESS: 0
DIARRHEA: 0
ABDOMINAL PAIN: 0
NAUSEA: 0
CONSTIPATION: 0
SINUS PAIN: 0

## 2021-10-20 ASSESSMENT — SOCIAL DETERMINANTS OF HEALTH (SDOH): HOW HARD IS IT FOR YOU TO PAY FOR THE VERY BASICS LIKE FOOD, HOUSING, MEDICAL CARE, AND HEATING?: NOT HARD AT ALL

## 2021-10-20 NOTE — PROGRESS NOTES
[facility]  Acute visit   10/20/2021    Juno Oakley (:  1955) is a 61 y.o. female, here for evaluation of the following medical concerns:    Chief Complaint   Patient presents with    Chest Pain     pulled muscle, been going on for 1 week         HPI  Since last Wednesday the patient has had up to 10/10 dull left sided chest pain wrapping around to the axilla and into the the scapula. This is not noticeable during the day. Currently it is 1/10. It is only worrisome at night. No prior injury but the patient thought it was a pulled muscle. Positional change does not affect it either. Attempts at massaging the area are ineffective. She has not tried OTC medications. No associated dyspnea, diaphoresis, nausea, or vomiting. She smokes 1/2 ppd for the last 30 years. ROS  Review of Systems   Constitutional: Negative for activity change, appetite change, chills, diaphoresis, fatigue, fever and unexpected weight change. HENT: Negative for sinus pressure, sinus pain, sore throat, trouble swallowing and voice change. Eyes: Positive for visual disturbance (REcent cataract surgery. \"Adjusting to L eye.\"  ). Respiratory: Negative for cough, chest tightness, shortness of breath and wheezing. Cardiovascular: Positive for chest pain. Negative for palpitations and leg swelling. Gastrointestinal: Negative for abdominal distention, abdominal pain, blood in stool, constipation, diarrhea, nausea and vomiting. Endocrine: Negative for polydipsia and polyphagia. Genitourinary: Negative for decreased urine volume, difficulty urinating, dysuria and urgency. Musculoskeletal: Negative for back pain, gait problem, joint swelling and myalgias. Neurological: Negative for dizziness, seizures, syncope, light-headedness and headaches. Psychiatric/Behavioral: Negative for agitation, behavioral problems, confusion and suicidal ideas.        HISTORIES  Current Outpatient Medications on File Prior to Visit   Medication Sig Dispense Refill    cyanocobalamin 1000 MCG/ML injection INJECT 1 ML INTO THE MUSCLE EVERY 30 DAYS 10 mL 3    Syringe/Needle, Disp, (SYRINGE 3CC/25GX1\") 25G X 1\" 3 ML MISC 1 each by Does not apply route every 30 days 12 each 0    Cholecalciferol (VITAMIN D) 2000 UNITS CAPS capsule Take 1 capsule by mouth daily. 30 capsule 0     No current facility-administered medications on file prior to visit.       Allergies   Allergen Reactions    Erythromycin     Sulfa Antibiotics      Past Medical History:   Diagnosis Date    Asthma     Breast cyst 2014    H/O splenectomy 2012    Lumbar disc disease 2014    Other B-complex deficiencies     Pneumonia 11/15/2014    Thoracic or lumbosacral neuritis or radiculitis, unspecified     Vitamin D deficiency 2015     Patient Active Problem List   Diagnosis    Vitamin D deficiency    B12 deficiency    H/O splenectomy    Lumbar disc disease    Breast cyst    Essential hypertension    Smoker    Other headache syndrome    Increased pressure in the eye, right    Cystitis    Nephrolithiasis     Past Surgical History:   Procedure Laterality Date    SPLENECTOMY      H/O    TYMPANOSTOMY TUBE PLACEMENT       Social History     Socioeconomic History    Marital status:      Spouse name: Not on file    Number of children: 3    Years of education: Not on file    Highest education level: Not on file   Occupational History    Not on file   Tobacco Use    Smoking status: Current Every Day Smoker     Packs/day: 0.50     Years: 30.00     Pack years: 15.00     Types: Cigarettes     Last attempt to quit: 2016     Years since quittin.8    Smokeless tobacco: Never Used   Substance and Sexual Activity    Alcohol use: No     Alcohol/week: 0.0 standard drinks    Drug use: No    Sexual activity: Not on file   Other Topics Concern    Not on file   Social History Narrative    Not on file Social Determinants of Health     Financial Resource Strain: Low Risk     Difficulty of Paying Living Expenses: Not hard at all   Food Insecurity: No Food Insecurity    Worried About Running Out of Food in the Last Year: Never true    Yaakov of Food in the Last Year: Never true   Transportation Needs:     Lack of Transportation (Medical):  Lack of Transportation (Non-Medical):    Physical Activity:     Days of Exercise per Week:     Minutes of Exercise per Session:    Stress:     Feeling of Stress :    Social Connections:     Frequency of Communication with Friends and Family:     Frequency of Social Gatherings with Friends and Family:     Attends [employer]ology Services:     Active Member of Clubs or Organizations:     Attends Club or Organization Meetings:     Marital Status:    Intimate Partner Violence:     Fear of Current or Ex-Partner:     Emotionally Abused:     Physically Abused:     Sexually Abused:       Family History   Problem Relation Age of Onset    Stroke Father     Heart Attack Father 76    Cancer Mother         lymphoma    Heart Attack Mother 61    Stroke Maternal Grandmother        PE  Vitals:    10/20/21 1517   BP: (!) 140/78   Pulse: 79   Temp: 98.1 °F (36.7 °C)   SpO2: 97%   Weight: 136 lb 3.2 oz (61.8 kg)   Height: 5' 3\" (1.6 m)     Estimated body mass index is 24.13 kg/m² as calculated from the following:    Height as of this encounter: 5' 3\" (1.6 m). Weight as of this encounter: 136 lb 3.2 oz (61.8 kg). Physical Exam    ASSESSMENT/ PLAN:  1. Chest pain, unspecified type  - EKG 12 Lead  - CT CHEST PULMONARY EMBOLISM W CONTRAST; Future  - Echo Pharmacological Stress Test; Future  - Cardiac Stress Test- W Pharm; Future       Orders Placed This Encounter   Procedures    EKG 12 Lead      No orders of the defined types were placed in this encounter. There are no discontinued medications. No follow-ups on file.     James Draper MD    This dictation was generated by voice recognition computer software. Although all attempts are made to edit the dictation for accuracy, there may be errors in the transcription that are not intended.

## 2021-11-02 ENCOUNTER — HOSPITAL ENCOUNTER (OUTPATIENT)
Dept: NON INVASIVE DIAGNOSTICS | Age: 66
Discharge: HOME | End: 2021-11-02
Payer: COMMERCIAL

## 2021-11-02 DIAGNOSIS — R07.9 CHEST PAIN, UNSPECIFIED TYPE: ICD-10-CM

## 2021-11-02 PROCEDURE — 93017 CV STRESS TEST TRACING ONLY: CPT

## 2021-11-02 PROCEDURE — 93320 DOPPLER ECHO COMPLETE: CPT

## 2021-11-02 PROCEDURE — 93350 STRESS TTE ONLY: CPT

## 2021-11-02 PROCEDURE — 78452 HT MUSCLE IMAGE SPECT MULT: CPT

## 2021-12-15 ENCOUNTER — TELEPHONE (OUTPATIENT)
Dept: INTERNAL MEDICINE CLINIC | Age: 66
End: 2021-12-15

## 2021-12-15 DIAGNOSIS — Z00.00 ANNUAL PHYSICAL EXAM: ICD-10-CM

## 2021-12-15 DIAGNOSIS — R53.83 FATIGUE, UNSPECIFIED TYPE: ICD-10-CM

## 2021-12-15 DIAGNOSIS — E55.9 VITAMIN D DEFICIENCY: ICD-10-CM

## 2021-12-15 DIAGNOSIS — E53.8 B12 DEFICIENCY: Primary | ICD-10-CM

## 2021-12-15 DIAGNOSIS — I10 ESSENTIAL HYPERTENSION: ICD-10-CM

## 2021-12-15 NOTE — TELEPHONE ENCOUNTER
----- Message from Primo Ferro sent at 12/15/2021 12:59 PM EST -----  Subject: Message to Provider    QUESTIONS  Information for Provider? Patient calling to request bloodwork orders. Wants to have them completed before her appt on 12/20.  ---------------------------------------------------------------------------  --------------  CALL BACK INFO  What is the best way for the office to contact you? OK to leave message on   voicemail  Preferred Call Back Phone Number? [phone]  ---------------------------------------------------------------------------  --------------  SCRIPT ANSWERS  Relationship to Patient?  Self

## 2021-12-20 ENCOUNTER — OFFICE VISIT (OUTPATIENT)
Dept: INTERNAL MEDICINE CLINIC | Age: 66
End: 2021-12-20
Payer: COMMERCIAL

## 2021-12-20 VITALS
HEIGHT: 63 IN | WEIGHT: 133 LBS | HEART RATE: 77 BPM | BODY MASS INDEX: 23.57 KG/M2 | DIASTOLIC BLOOD PRESSURE: 80 MMHG | SYSTOLIC BLOOD PRESSURE: 130 MMHG

## 2021-12-20 DIAGNOSIS — E55.9 VITAMIN D DEFICIENCY: ICD-10-CM

## 2021-12-20 DIAGNOSIS — F17.200 SMOKER: ICD-10-CM

## 2021-12-20 DIAGNOSIS — Z00.00 PE (PHYSICAL EXAM), ANNUAL: Primary | ICD-10-CM

## 2021-12-20 DIAGNOSIS — Z23 NEED FOR INFLUENZA VACCINATION: ICD-10-CM

## 2021-12-20 DIAGNOSIS — Z23 NEED FOR PNEUMOCOCCAL VACCINATION: ICD-10-CM

## 2021-12-20 DIAGNOSIS — Z00.00 ENCOUNTER FOR WELL ADULT EXAM WITHOUT ABNORMAL FINDINGS: ICD-10-CM

## 2021-12-20 DIAGNOSIS — I10 ESSENTIAL HYPERTENSION: ICD-10-CM

## 2021-12-20 DIAGNOSIS — E53.8 B12 DEFICIENCY: ICD-10-CM

## 2021-12-20 PROBLEM — N30.90 CYSTITIS: Status: RESOLVED | Noted: 2020-06-16 | Resolved: 2021-12-20

## 2021-12-20 PROCEDURE — 90472 IMMUNIZATION ADMIN EACH ADD: CPT | Performed by: INTERNAL MEDICINE

## 2021-12-20 PROCEDURE — 90674 CCIIV4 VAC NO PRSV 0.5 ML IM: CPT | Performed by: INTERNAL MEDICINE

## 2021-12-20 PROCEDURE — 90732 PPSV23 VACC 2 YRS+ SUBQ/IM: CPT | Performed by: INTERNAL MEDICINE

## 2021-12-20 PROCEDURE — 90471 IMMUNIZATION ADMIN: CPT | Performed by: INTERNAL MEDICINE

## 2021-12-20 PROCEDURE — 99396 PREV VISIT EST AGE 40-64: CPT | Performed by: INTERNAL MEDICINE

## 2021-12-20 NOTE — PROGRESS NOTES
Annual Wellness Visit     Patient:  Lisa Mortensen                                               : 1955  Age: 61 y.o. MRN: [id number]  Date : 2021      CHIEF COMPLAINT: Lisa Mortensen is a 61 y.o. female who presents for : Physical exam    1. B12 deficiency  History of B12 deficiency on supplements  - VITAMIN B12; Future    2. Essential hypertension  Blood pressure well controlled    3. Smoker  Continues to smoke but has about a 15-pack-year smoking history smokes only 1 or 2 cigarettes a day strongly encouraged to DC    4. Vitamin D deficiency    - VITAMIN D 25 HYDROXY; Future    5. PE (physical exam), annual  Generally feels okay denies any chest pain shortness of breath or any other problems states that she had all her injections prior to her splenectomy which was done electively for splenomegaly  - CBC Auto Differential; Future  - Comprehensive Metabolic Panel; Future  - Lipid Panel; Future  - TSH without Reflex; Future  - Urinalysis;  Future  - VITAMIN B12; Future        Patient Active Problem List    Diagnosis Date Noted    Nephrolithiasis     Increased pressure in the eye, right 2018    Smoker 2018    Other headache syndrome 2018    Essential hypertension 2014    Lumbar disc disease 2014    Breast cyst 2014    H/O splenectomy 2012    B12 deficiency 2012    Vitamin D deficiency 2010       Constitutional:  Denies fever or chills   Eyes:  Denies change in visual acuity   HENT:  Denies nasal congestion or sore throat   Respiratory:  Denies cough or shortness of breath   Cardiovascular:  Denies chest pain or edema   GI:  Denies abdominal pain, nausea, vomiting, bloody stools or diarrhea   :  Denies dysuria   Musculoskeletal:  Denies back pain or joint pain   Integument:  Denies rash   Neurologic:  Denies headache, focal weakness or sensory changes   Endocrine:  Denies polyuria or polydipsia   Lymphatic:  Denies swollen glands Psychiatric:  Denies depression or anxiety     Past Medical History:        Diagnosis Date    Asthma     Breast cyst 2014    H/O splenectomy 2012    Lumbar disc disease 2014    Other B-complex deficiencies     Pneumonia 11/15/2014    Thoracic or lumbosacral neuritis or radiculitis, unspecified     Vitamin D deficiency 2015       Past Surgical History:        Procedure Laterality Date    SPLENECTOMY      H/O    TYMPANOSTOMY TUBE PLACEMENT         Family History:  Family History   Problem Relation Age of Onset    Stroke Father     Heart Attack Father 76    Cancer Mother         lymphoma    Heart Attack Mother 61    Stroke Maternal Grandmother        Social History:  Social History     Socioeconomic History    Marital status:      Spouse name: None    Number of children: 3    Years of education: None    Highest education level: None   Occupational History    None   Tobacco Use    Smoking status: Current Every Day Smoker     Packs/day: 0.50     Years: 30.00     Pack years: 15.00     Types: Cigarettes     Last attempt to quit: 2016     Years since quittin.9    Smokeless tobacco: Never Used   Substance and Sexual Activity    Alcohol use: No     Alcohol/week: 0.0 standard drinks    Drug use: No    Sexual activity: None   Other Topics Concern    None   Social History Narrative    None     Social Determinants of Health     Financial Resource Strain: Low Risk     Difficulty of Paying Living Expenses: Not hard at all   Food Insecurity: No Food Insecurity    Worried About Running Out of Food in the Last Year: Never true    Yaakov of Food in the Last Year: Never true   Transportation Needs:     Lack of Transportation (Medical): Not on file    Lack of Transportation (Non-Medical):  Not on file   Physical Activity:     Days of Exercise per Week: Not on file    Minutes of Exercise per Session: Not on file   Stress:     Feeling of Stress : Not on file   Social Connections:     Frequency of Communication with Friends and Family: Not on file    Frequency of Social Gatherings with Friends and Family: Not on file    Attends Mu-ism Services: Not on file    Active Member of Clubs or Organizations: Not on file    Attends Club or Organization Meetings: Not on file    Marital Status: Not on file   Intimate Partner Violence:     Fear of Current or Ex-Partner: Not on file    Emotionally Abused: Not on file    Physically Abused: Not on file    Sexually Abused: Not on file   Housing Stability:     Unable to Pay for Housing in the Last Year: Not on file    Number of [place] in the Last Year: Not on file    Unstable Housing in the Last Year: Not on file         Allergies:  Erythromycin and Sulfa antibiotics    Current Medications:    Prior to Admission medications    Medication Sig Start Date End Date Taking? Authorizing Provider   cyanocobalamin 1000 MCG/ML injection INJECT 1 ML INTO THE MUSCLE EVERY 30 DAYS 6/16/21  Yes Reji Skinner MD   Cholecalciferol (VITAMIN D) 2000 UNITS CAPS capsule Take 1 capsule by mouth daily. 1/4/13  Yes Reji Skinner MD           Physical Exam:      Constitutional:  Well developed, well nourished, no acute distress, non-toxic appearance   Eyes:  PERRL, conjunctiva normal   HENT:  Atraumatic, external ears normal, nose normal, oropharynx moist, no pharyngeal exudates. Neck- normal range of motion, no tenderness, supple   Respiratory:  No respiratory distress, normal breath sounds, no rales, no wheezing   Cardiovascular:  Normal rate, normal rhythm, no murmurs, no gallops, no rubs   GI:  Soft, nondistended, normal bowel sounds, nontender, no organomegaly, no mass, no rebound, no guarding   :  No costovertebral angle tenderness   Musculoskeletal:  No edema, no tenderness, no deformities.  Back- no tenderness  Integument:  Well hydrated, no rash   Lymphatic:  No lymphadenopathy noted   Neurologic:  Alert & oriented x 3, CN 2-12 normal, normal motor function, normal sensory function, no focal deficits noted   Psychiatric:  Speech and behavior appropriate       Vitals: /80   Pulse 77   Ht 5' 3\" (1.6 m)   Wt 133 lb (60.3 kg)   BMI 23.56 kg/m²     Body mass index is 23.56 kg/m². Wt Readings from Last 3 Encounters:   12/20/21 133 lb (60.3 kg)   10/20/21 136 lb 3.2 oz (61.8 kg)   11/24/20 138 lb (62.6 kg)         LABS:    CBC:   Lab Results   Component Value Date    WBC 11.2 (H) 12/12/2020    HGB 12.5 12/12/2020    HCT 39.5 12/12/2020    MCV 78.4 (L) 12/12/2020     (H) 12/12/2020           Lab Results   Component Value Date    IRON 40 03/26/2019    TIBC 342 03/26/2019    FOLATE 9.53 06/01/2012    [id number] 516 11/18/2019                                                             BMP:    Lab Results   Component Value Date     12/12/2020    K 4.3 12/12/2020     12/12/2020    CO2 28 12/12/2020       LFT's:   Lab Results   Component Value Date    ALT 9 (L) 12/12/2020    AST 14 12/12/2020    GGT 23 06/01/2012    ALKPHOS 152 (H) 12/12/2020    BILITOT 0.4 12/12/2020       Lipids:   Lab Results   Component Value Date    CHOL 180 12/12/2020    HDL 38 (L) 12/12/2020    LDLCALC 110 12/12/2020    TRIG 160 (H) 12/12/2020       INR: No results found for: INR, PROTIME    U/A:  Lab Results   Component Value Date    LABMICR YES 06/16/2020        No results found for: LABA1C     Lab Results   Component Value Date    CREATININE 0.84 12/12/2020       -----------------------------------------------------------------     Assessment/Plan:   1. B12 deficiency  This problem is stable check above labs continue present meds  - VITAMIN B12; Future    2. Essential hypertension  Problem is stable continue present meds    3. Smoker  Strongly encouraged to DC smoking    4. Vitamin D deficiency  Check above labs  - VITAMIN D 25 HYDROXY; Future    5.  PE (physical exam), annual  Check screening labs continue diet and exercise she is to get a repeat Pneumovax and will check her old records to confirm that she has had Haemophilus and meningitis shot possible  - CBC Auto Differential; Future  - Comprehensive Metabolic Panel; Future  - Lipid Panel; Future  - TSH without Reflex; Future  - Urinalysis; Future  - VITAMIN B12; Future         Well Adult Note  Name: Kieran Jeffery Date: 2021   MRN: [id number] Sex: Female   Age: 61 y.o. Ethnicity: Non- / Non    : 1955 Race: White (non-)      Rosibel Henson is here for well adult exam.  History:        Review of Systems    Allergies   Allergen Reactions    Erythromycin     Sulfa Antibiotics        Prior to Visit Medications    Medication Sig Taking? Authorizing Provider   cyanocobalamin 1000 MCG/ML injection INJECT 1 ML INTO THE MUSCLE EVERY 30 DAYS Yes Jen Escobar MD   Cholecalciferol (VITAMIN D) 2000 UNITS CAPS capsule Take 1 capsule by mouth daily.  Yes Jen Escobar MD       Past Medical History:   Diagnosis Date    Asthma     Breast cyst 2014    H/O splenectomy 2012    Lumbar disc disease 2014    Other B-complex deficiencies     Pneumonia 11/15/2014    Thoracic or lumbosacral neuritis or radiculitis, unspecified     Vitamin D deficiency 2015       Past Surgical History:   Procedure Laterality Date    SPLENECTOMY      H/O    TYMPANOSTOMY TUBE PLACEMENT         Family History   Problem Relation Age of Onset    Stroke Father     Heart Attack Father 76    Cancer Mother         lymphoma    Heart Attack Mother 61    Stroke Maternal Grandmother        Social History     Tobacco Use    Smoking status: Current Every Day Smoker     Packs/day: 0.50     Years: 30.00     Pack years: 15.00     Types: Cigarettes     Last attempt to quit: 2016     Years since quittin.9    Smokeless tobacco: Never Used   Substance Use Topics    Alcohol use: No     Alcohol/week: 0.0 standard drinks    Drug use: No       Objective   /80   Pulse 77 Ht 5' 3\" (1.6 m)   Wt 133 lb (60.3 kg)   BMI 23.56 kg/m²   Wt Readings from Last 3 Encounters:   12/20/21 133 lb (60.3 kg)   10/20/21 136 lb 3.2 oz (61.8 kg)   11/24/20 138 lb (62.6 kg)     There were no vitals filed for this visit. Physical Exam      Assessment   Plan   1. PE (physical exam), annual  -     CBC Auto Differential; Future  -     Comprehensive Metabolic Panel; Future  -     Lipid Panel; Future  -     TSH without Reflex; Future  -     Urinalysis; Future  -     VITAMIN B12; Future  2. B12 deficiency  -     VITAMIN B12; Future  3. Essential hypertension  4. Smoker  5. Vitamin D deficiency  -     VITAMIN D 25 HYDROXY; Future  6.  Encounter for well adult exam without abnormal findings         Personalized Preventive Plan   Current Health Maintenance Status  Immunization History   Administered Date(s) Administered    COVID-19, Pfizer, PF, 30mcg/0.3mL 05/22/2021, 06/12/2021    Influenza Vaccine, unspecified formulation 10/22/2017    Influenza Whole 10/10/2008    Influenza, Quadv, IM, (6 mo and older Fluzone, Flulaval, Fluarix and 3 yrs and older Afluria) 11/15/2020    Influenza, Quadv, IM, PF (6 mo and older Fluzone, Flulaval, Fluarix, and 3 yrs and older Afluria) 11/05/2019    Pneumococcal Conjugate 13-valent ([id number]) 03/25/2016    Pneumococcal Polysaccharide ([id number]) 04/28/2015    Tdap (Boostrix, Adacel) 11/24/2020    Zoster Live (Zostavax) 02/22/2017    Zoster Recombinant (Shingrix) 08/24/2018        Health Maintenance   Topic Date Due    Meningococcal (ACWY) vaccine (1 - Risk start 2-23 months series) Never done    Hib vaccine (1 of 1 - Risk 1-dose series) Never done    Meningococcal B vaccine (1 of 4 - Increased Risk Bexsero 2-dose series) Never done    Shingles Vaccine (3 of 3) 10/19/2018    Pneumococcal 0-64 years Vaccine (3 of 4 - PPSV23) 04/28/2020    Flu vaccine (1) 09/01/2021    COVID-19 Vaccine (3 - Booster for Pfizer series) 12/12/2021    Potassium monitoring 12/12/2021    Creatinine monitoring  12/12/2021    Breast cancer screen  12/12/2022    Colon cancer screen colonoscopy  12/12/2024    Lipid screen  12/12/2025    DTaP/Tdap/Td vaccine (2 - Td or Tdap) 11/24/2030    Hepatitis C screen  Addressed    HIV screen  Addressed    Hepatitis A vaccine  Aged Out    Hepatitis B vaccine  Aged Out     Recommendations for brettapproved Due: see orders and patient instructions/AVS.    No follow-ups on file.

## 2022-02-09 ENCOUNTER — OFFICE VISIT (OUTPATIENT)
Dept: INTERNAL MEDICINE CLINIC | Age: 67
End: 2022-02-09
Payer: COMMERCIAL

## 2022-02-09 ENCOUNTER — TELEPHONE (OUTPATIENT)
Dept: INTERNAL MEDICINE CLINIC | Age: 67
End: 2022-02-09

## 2022-02-09 VITALS
DIASTOLIC BLOOD PRESSURE: 85 MMHG | HEART RATE: 83 BPM | HEIGHT: 63 IN | WEIGHT: 132 LBS | BODY MASS INDEX: 23.39 KG/M2 | SYSTOLIC BLOOD PRESSURE: 161 MMHG

## 2022-02-09 DIAGNOSIS — R21 SKIN ERUPTION: Primary | ICD-10-CM

## 2022-02-09 DIAGNOSIS — R21 SKIN ERUPTION: ICD-10-CM

## 2022-02-09 LAB
A/G RATIO: 1.1 (ref 1.1–2.2)
ALBUMIN SERPL-MCNC: 4.1 G/DL (ref 3.4–5)
ALP BLD-CCNC: 199 U/L (ref 40–129)
ALT SERPL-CCNC: 8 U/L (ref 10–40)
ANION GAP SERPL CALCULATED.3IONS-SCNC: 11 MMOL/L (ref 3–16)
AST SERPL-CCNC: 15 U/L (ref 15–37)
BASOPHILS ABSOLUTE: 0.1 K/UL (ref 0–0.2)
BASOPHILS RELATIVE PERCENT: 0.7 %
BILIRUB SERPL-MCNC: <0.2 MG/DL (ref 0–1)
BUN BLDV-MCNC: 13 MG/DL (ref 7–20)
CALCIUM SERPL-MCNC: 9.7 MG/DL (ref 8.3–10.6)
CHLORIDE BLD-SCNC: 103 MMOL/L (ref 99–110)
CO2: 25 MMOL/L (ref 21–32)
CREAT SERPL-MCNC: 1 MG/DL (ref 0.6–1.2)
EOSINOPHILS ABSOLUTE: 0.2 K/UL (ref 0–0.6)
EOSINOPHILS RELATIVE PERCENT: 1.6 %
GFR AFRICAN AMERICAN: >60
GFR NON-AFRICAN AMERICAN: 56
GLUCOSE BLD-MCNC: 102 MG/DL (ref 70–99)
HCT VFR BLD CALC: 38.1 % (ref 36–48)
HEMOGLOBIN: 12.2 G/DL (ref 12–16)
LYMPHOCYTES ABSOLUTE: 4.8 K/UL (ref 1–5.1)
LYMPHOCYTES RELATIVE PERCENT: 37.6 %
MCH RBC QN AUTO: 25 PG (ref 26–34)
MCHC RBC AUTO-ENTMCNC: 32 G/DL (ref 31–36)
MCV RBC AUTO: 78 FL (ref 80–100)
MONOCYTES ABSOLUTE: 1.2 K/UL (ref 0–1.3)
MONOCYTES RELATIVE PERCENT: 9 %
NEUTROPHILS ABSOLUTE: 6.6 K/UL (ref 1.7–7.7)
NEUTROPHILS RELATIVE PERCENT: 51.1 %
PDW BLD-RTO: 16.6 % (ref 12.4–15.4)
PLATELET # BLD: 367 K/UL (ref 135–450)
PMV BLD AUTO: 9.6 FL (ref 5–10.5)
POTASSIUM SERPL-SCNC: 4.8 MMOL/L (ref 3.5–5.1)
RBC # BLD: 4.88 M/UL (ref 4–5.2)
SEDIMENTATION RATE, ERYTHROCYTE: 74 MM/HR (ref 0–30)
SODIUM BLD-SCNC: 139 MMOL/L (ref 136–145)
TOTAL PROTEIN: 7.7 G/DL (ref 6.4–8.2)
WBC # BLD: 12.9 K/UL (ref 4–11)

## 2022-02-09 PROCEDURE — 99214 OFFICE O/P EST MOD 30 MIN: CPT | Performed by: INTERNAL MEDICINE

## 2022-02-09 RX ORDER — METHYLPREDNISOLONE 4 MG/1
TABLET ORAL
Qty: 1 KIT | Refills: 0 | Status: SHIPPED | OUTPATIENT
Start: 2022-02-09 | End: 2022-02-15

## 2022-02-09 NOTE — TELEPHONE ENCOUNTER
Patient has rash covering arms and legs. Started end of last week and has been spreading. Somewhat itchy. Wants to be seen or have medication called in.     Please call and advise    Pharmacy:  [id number] - [place], [address] [facility]

## 2022-02-09 NOTE — PROGRESS NOTES
Established Patient Visit    Patient:  Elissa Starr                                               : 1955  Age: 74 y.o. MRN: [id number]  Date : 2022      CHIEF COMPLAINT: Elissa Starr is a 74 y.o. female who presents for :    1. Skin eruption  Eruptions all over her body, first erupyion noted on Thursday on her left UE. Itchy and annoying. No pain, no drainage. Never had this before    No new meds. No new laundry, soap, drier sheet, skin care products, no new pets, no new carpets, no new sheets. AC has been on since Dec, no humidifier, no new filters. F/C, CP, SOB, N/V, eating okay, BM okay, no urinary sx    Hx of asthma. Current smoker     - Medrol dosepack  - SEDIMENTATION RATE; Future  - COMPREHENSIVE METABOLIC PANEL;  Future  - CBC Auto Differential; Future    Patient Active Problem List    Diagnosis Date Noted    Nephrolithiasis     Increased pressure in the eye, right 2018    Smoker 2018    Other headache syndrome 2018    Essential hypertension 2014    Lumbar disc disease 2014    Breast cyst 2014    H/O splenectomy 2012    B12 deficiency 2012    Vitamin D deficiency 2010     Constitutional:  Denies fever or chills   HENT:  Denies nasal congestion or sore throat   Respiratory:  Denies cough or shortness of breath   Cardiovascular:  Denies chest pain or edema   GI:  Denies abdominal pain, nausea, vomiting, bloody stools or diarrhea   :  Denies dysuria   Integument:  +rash  Lymphatic:  Denies swollen glands   Psychiatric:  Denies depression or anxiety     Past Medical History:        Diagnosis Date    Asthma     Breast cyst 2014    H/O splenectomy 2012    Lumbar disc disease 2014    Other B-complex deficiencies     Pneumonia 11/15/2014    Thoracic or lumbosacral neuritis or radiculitis, unspecified     Vitamin D deficiency 2015     Past Surgical History:        Procedure Laterality Date    SPLENECTOMY      H/O    TYMPANOSTOMY TUBE PLACEMENT       Allergies:  Erythromycin and Sulfa antibiotics    Current Medications:    Prior to Admission medications    Medication Sig Start Date End Date Taking? Authorizing Provider   cyanocobalamin 1000 MCG/ML injection INJECT 1 ML INTO THE MUSCLE EVERY 30 DAYS 6/16/21  Yes Denver Mannan, MD   Cholecalciferol (VITAMIN D) 2000 UNITS CAPS capsule Take 1 capsule by mouth daily. 1/4/13  Yes Denver Mannan, MD       Physical Exam:      Constitutional:  Well developed, well nourished, no acute distress, non-toxic appearance   Respiratory:  No respiratory distress, normal breath sounds, no rales, no wheezing   Cardiovascular:  Normal rate, normal rhythm, no murmurs, no gallops, no rubs   GI:  Soft, nondistended, normal bowel sounds, nontender  Musculoskeletal:  No edema, no tenderness, no deformities. Back- no tenderness  Integument:  Well hydrated, Eruptions present all over the body, predominantly in her extremities, migrating to the body. Eruptions are red, appears dry, non tender, non exudative   Lymphatic:  No lymphadenopathy noted   Neurologic:  Alert & oriented x 3, CN 2-12 normal, normal motor function, normal sensory function, no focal deficits noted     Vitals: BP (!) 161/85   Pulse 83   Ht 5' 3\" (1.6 m)   Wt 132 lb (59.9 kg)   BMI 23.38 kg/m²     Body mass index is 23.38 kg/m².   Wt Readings from Last 3 Encounters:   02/09/22 132 lb (59.9 kg)   12/20/21 133 lb (60.3 kg)   10/20/21 136 lb 3.2 oz (61.8 kg)     LABS:    CBC:   Lab Results   Component Value Date    WBC 11.2 (H) 12/12/2020    HGB 12.5 12/12/2020    HCT 39.5 12/12/2020    MCV 78.4 (L) 12/12/2020     (H) 12/12/2020      Lab Results   Component Value Date    IRON 40 03/26/2019    TIBC 342 03/26/2019    FOLATE 9.53 06/01/2012    [id number] 516 11/18/2019                                                         BMP:    Lab Results   Component Value Date     12/12/2020    K 4.3 12/12/2020    CL 106 12/12/2020    CO2 28 12/12/2020     LFT's:   Lab Results   Component Value Date    ALT 9 (L) 12/12/2020    AST 14 12/12/2020    GGT 23 06/01/2012    ALKPHOS 152 (H) 12/12/2020    BILITOT 0.4 12/12/2020     Lipids:   Lab Results   Component Value Date    CHOL 180 12/12/2020    HDL 38 (L) 12/12/2020    LDLCALC 110 12/12/2020    TRIG 160 (H) 12/12/2020       INR: No results found for: INR, PROTIME    U/A:  Lab Results   Component Value Date    LABMICR YES 06/16/2020      No results found for: LABA1C     Lab Results   Component Value Date    CREATININE 0.84 12/12/2020     -----------------------------------------------------------------    Assessment/Plan:   1. Skin eruption  This is likely a viral exanthem. Recommended to keep skin hydrated. Will draw labs as below. Recommended to reach out to clinic if symptoms does not worse. - Medrol rika  - SEDIMENTATION RATE; Future  - COMPREHENSIVE METABOLIC PANEL;  Future  - CBC Auto Differential; Future

## 2022-02-10 ENCOUNTER — TELEPHONE (OUTPATIENT)
Dept: INTERNAL MEDICINE CLINIC | Age: 67
End: 2022-02-10

## 2022-02-16 ENCOUNTER — OFFICE VISIT (OUTPATIENT)
Dept: INTERNAL MEDICINE CLINIC | Age: 67
End: 2022-02-16
Payer: MEDICARE

## 2022-02-16 VITALS
DIASTOLIC BLOOD PRESSURE: 86 MMHG | TEMPERATURE: 98.2 F | HEART RATE: 93 BPM | BODY MASS INDEX: 23.74 KG/M2 | SYSTOLIC BLOOD PRESSURE: 143 MMHG | WEIGHT: 134 LBS

## 2022-02-16 DIAGNOSIS — R71.8 RBC MICROCYTOSIS: ICD-10-CM

## 2022-02-16 DIAGNOSIS — R21 SKIN ERUPTION: ICD-10-CM

## 2022-02-16 DIAGNOSIS — R21 SKIN ERUPTION: Primary | ICD-10-CM

## 2022-02-16 LAB
BASOPHILS ABSOLUTE: 0.1 K/UL (ref 0–0.2)
BASOPHILS RELATIVE PERCENT: 0.8 %
C-REACTIVE PROTEIN: 10.6 MG/L (ref 0–5.1)
EOSINOPHILS ABSOLUTE: 0.3 K/UL (ref 0–0.6)
EOSINOPHILS RELATIVE PERCENT: 2.2 %
HCT VFR BLD CALC: 39.1 % (ref 36–48)
HEMOGLOBIN: 12.5 G/DL (ref 12–16)
IRON SATURATION: 14 % (ref 15–50)
IRON: 49 UG/DL (ref 37–145)
LYMPHOCYTES ABSOLUTE: 4.5 K/UL (ref 1–5.1)
LYMPHOCYTES RELATIVE PERCENT: 31.4 %
MCH RBC QN AUTO: 24.7 PG (ref 26–34)
MCHC RBC AUTO-ENTMCNC: 31.9 G/DL (ref 31–36)
MCV RBC AUTO: 77.6 FL (ref 80–100)
MONOCYTES ABSOLUTE: 1.2 K/UL (ref 0–1.3)
MONOCYTES RELATIVE PERCENT: 8.6 %
NEUTROPHILS ABSOLUTE: 8.2 K/UL (ref 1.7–7.7)
NEUTROPHILS RELATIVE PERCENT: 57 %
PDW BLD-RTO: 17.1 % (ref 12.4–15.4)
PLATELET # BLD: 392 K/UL (ref 135–450)
PMV BLD AUTO: 9.3 FL (ref 5–10.5)
RBC # BLD: 5.05 M/UL (ref 4–5.2)
RHEUMATOID FACTOR: 12 IU/ML
TOTAL IRON BINDING CAPACITY: 360 UG/DL (ref 260–445)
WBC # BLD: 14.3 K/UL (ref 4–11)

## 2022-02-16 PROCEDURE — 99213 OFFICE O/P EST LOW 20 MIN: CPT | Performed by: INTERNAL MEDICINE

## 2022-02-16 RX ORDER — PREDNISONE 1 MG/1
10 TABLET ORAL DAILY
Qty: 100 TABLET | Refills: 0 | Status: SHIPPED | OUTPATIENT
Start: 2022-02-16 | End: 2022-05-27

## 2022-02-16 RX ORDER — PREDNISONE 1 MG/1
5 TABLET ORAL DAILY
Qty: 100 TABLET | Refills: 0 | Status: SHIPPED | OUTPATIENT
Start: 2022-02-16 | End: 2022-02-16

## 2022-02-16 NOTE — PROGRESS NOTES
Follow Up Visit  Established Patient Visit    Patient:  Swapna Sheridan                                               : 1955  Age: 74 y.o. MRN: [id number]  Date : 2022    CHIEF COMPLAINT: Swapna Sheridan is a 74 y.o. female who presents for : rash follow up      1. Skin eruption  Was here for skin eruption, sent medrol dosepak. Lab work with elevated CRP.     Rash still present  Itching a little less   Pt has been hydrating her skin well over the past week     Patient Active Problem List    Diagnosis Date Noted    RBC microcytosis 2022    Nephrolithiasis     Increased pressure in the eye, right 2018    Smoker 2018    Other headache syndrome 2018    Essential hypertension 2014    Lumbar disc disease 2014    Breast cyst 2014    H/O splenectomy 2012    B12 deficiency 2012    Vitamin D deficiency 2010       Constitutional:  Denies fever or chills   Respiratory:  Denies cough or shortness of breath   Cardiovascular:  Denies chest pain or edema   GI:  Denies abdominal pain, nausea, vomiting, bloody stools or diarrhea   :  Denies dysuria   Musculoskeletal:  Denies back pain or joint pain   Integument: Rash as above  Neurologic:  Denies headache, focal weakness or sensory changes   Endocrine:  Denies polyuria or polydipsia   Psychiatric:  Denies depression or anxiety     Past Medical History:        Diagnosis Date    Asthma     Breast cyst 2014    H/O splenectomy 2012    Lumbar disc disease 2014    Other B-complex deficiencies     Pneumonia 11/15/2014    Thoracic or lumbosacral neuritis or radiculitis, unspecified     Vitamin D deficiency 2015     Past Surgical History:        Procedure Laterality Date    SPLENECTOMY      H/O    TYMPANOSTOMY TUBE PLACEMENT       Allergies:  Erythromycin and Sulfa antibiotics    Current Medications:    Prior to Admission medications    Medication Sig Start Date End Date Taking? Authorizing Provider   predniSONE (DELTASONE) 5 MG tablet Take 1 tablet by mouth daily for 100 doses 2/16/22 5/27/22 Yes Marla Carranza MD   cyanocobalamin 1000 MCG/ML injection INJECT 1 ML INTO THE MUSCLE EVERY 30 DAYS 6/16/21   Marla Carranza MD   Cholecalciferol (VITAMIN D) 2000 UNITS CAPS capsule Take 1 capsule by mouth daily. 1/4/13   Marla Carranza MD     Physical Exam:      Constitutional:  Well developed, well nourished, no acute distress, non-toxic appearance   Respiratory:  No respiratory distress, normal breath sounds, no rales, no wheezing   Cardiovascular:  Normal rate, normal rhythm, no murmurs, no gallops, no rubs   GI:  Soft, nondistended, normal bowel sounds, nontender  Musculoskeletal:  No edema, no tenderness, no deformities. Back- no tenderness  Integument: Rash present. Multiple erythematous, scaly, macule and papules  Lymphatic:  No lymphadenopathy noted   Neurologic:  Alert & oriented x 3, normal motor function, normal sensory function, no focal deficits noted   Psychiatric:  Speech and behavior appropriate     Vitals: BP (!) 143/86   Pulse 93   Temp 98.2 °F (36.8 °C) (Temporal)   Wt 134 lb (60.8 kg)   BMI 23.74 kg/m²     Body mass index is 23.74 kg/m².   Wt Readings from Last 3 Encounters:   02/16/22 134 lb (60.8 kg)   02/09/22 132 lb (59.9 kg)   12/20/21 133 lb (60.3 kg)         LABS:    CBC:   Lab Results   Component Value Date    WBC 12.9 (H) 02/09/2022    HGB 12.2 02/09/2022    HCT 38.1 02/09/2022    MCV 78.0 (L) 02/09/2022     02/09/2022           Lab Results   Component Value Date    IRON 40 03/26/2019    TIBC 342 03/26/2019    FOLATE 9.53 06/01/2012    [id number] 516 11/18/2019                                                             BMP:    Lab Results   Component Value Date     02/09/2022    K 4.8 02/09/2022     02/09/2022    CO2 25 02/09/2022       LFT's:   Lab Results   Component Value Date    ALT 8 (L) 02/09/2022    AST 15 02/09/2022    GGT 23 06/01/2012 ALKPHOS 199 (H) 02/09/2022    BILITOT <0.2 02/09/2022       Lipids:   Lab Results   Component Value Date    CHOL 180 12/12/2020    HDL 38 (L) 12/12/2020    LDLCALC 110 12/12/2020    TRIG 160 (H) 12/12/2020       INR: No results found for: INR, PROTIME    U/A:  Lab Results   Component Value Date    LABMICR YES 06/16/2020        No results found for: LABA1C     Lab Results   Component Value Date    CREATININE 1.0 02/09/2022     -----------------------------------------------------------------     Assessment/Plan:   1. 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 Will refer to derm for skin bx. Will get autoimmune labs and monitor    - predniSONE (DELTASONE) 5 MG tablet; Take 1 tablet by mouth daily for 100 doses  Dispense: 100 tablet; Refill: 0  - External Referral To Dermatology  - CBC with Auto Differential; Future  - C-Reactive Protein; Future  - RHEUMATOID FACTOR; Future  - ISAEL Reflex to Antibody Cascade; Future    2. RBC microcytosis  No anemia  - Iron and TIBC;  Future

## 2022-02-16 NOTE — TELEPHONE ENCOUNTER
Pharmacy called into the office to request a call back to verify the dosage for the following medication. They received one scrip for 1 dose for 100 doses then received 2 dose per 100 doses. They need clarification. predniSONE (DELTASONE) 5 MG tablet         Please advise.

## 2022-02-17 LAB — ANTI-NUCLEAR ANTIBODY (ANA): NEGATIVE

## 2022-07-16 ENCOUNTER — APPOINTMENT (OUTPATIENT)
Dept: GENERAL RADIOLOGY | Age: 67
End: 2022-07-16
Payer: MEDICARE

## 2022-07-16 ENCOUNTER — HOSPITAL ENCOUNTER (EMERGENCY)
Age: 67
Discharge: HOME | End: 2022-07-16
Attending: EMERGENCY MEDICINE
Payer: MEDICARE

## 2022-07-16 VITALS
HEIGHT: 63 IN | SYSTOLIC BLOOD PRESSURE: 150 MMHG | OXYGEN SATURATION: 100 % | HEART RATE: 84 BPM | BODY MASS INDEX: 23.21 KG/M2 | DIASTOLIC BLOOD PRESSURE: 71 MMHG | RESPIRATION RATE: 16 BRPM | TEMPERATURE: 98.2 F | WEIGHT: 131 LBS

## 2022-07-16 DIAGNOSIS — U07.1 COVID: Primary | ICD-10-CM

## 2022-07-16 LAB
ANION GAP SERPL CALCULATED.3IONS-SCNC: 9 MMOL/L (ref 3–16)
BASOPHILS ABSOLUTE: 0.1 K/UL (ref 0–0.2)
BASOPHILS RELATIVE PERCENT: 1.4 %
BILIRUBIN URINE: NEGATIVE
BLOOD, URINE: NEGATIVE
BUN BLDV-MCNC: 17 MG/DL (ref 7–20)
CALCIUM SERPL-MCNC: 9 MG/DL (ref 8.3–10.6)
CHLORIDE BLD-SCNC: 103 MMOL/L (ref 99–110)
CLARITY: CLEAR
CO2: 25 MMOL/L (ref 21–32)
COLOR: YELLOW
CREAT SERPL-MCNC: 0.9 MG/DL (ref 0.6–1.2)
EOSINOPHILS ABSOLUTE: 0 K/UL (ref 0–0.6)
EOSINOPHILS RELATIVE PERCENT: 0 %
GFR AFRICAN AMERICAN: >60
GFR NON-AFRICAN AMERICAN: >60
GLUCOSE BLD-MCNC: 114 MG/DL (ref 70–99)
GLUCOSE URINE: NEGATIVE MG/DL
HCT VFR BLD CALC: 38.1 % (ref 36–48)
HEMOGLOBIN: 12.2 G/DL (ref 12–16)
INFLUENZA A: NOT DETECTED
INFLUENZA B: NOT DETECTED
KETONES, URINE: NEGATIVE MG/DL
LEUKOCYTE ESTERASE, URINE: NEGATIVE
LYMPHOCYTES ABSOLUTE: 2.2 K/UL (ref 1–5.1)
LYMPHOCYTES RELATIVE PERCENT: 33.6 %
MCH RBC QN AUTO: 25 PG (ref 26–34)
MCHC RBC AUTO-ENTMCNC: 32.2 G/DL (ref 31–36)
MCV RBC AUTO: 77.7 FL (ref 80–100)
MICROSCOPIC EXAMINATION: NORMAL
MONOCYTES ABSOLUTE: 0.9 K/UL (ref 0–1.3)
MONOCYTES RELATIVE PERCENT: 14.4 %
NEUTROPHILS ABSOLUTE: 3.2 K/UL (ref 1.7–7.7)
NEUTROPHILS RELATIVE PERCENT: 50.6 %
NITRITE, URINE: NEGATIVE
PDW BLD-RTO: 16.5 % (ref 12.4–15.4)
PH UA: 6 (ref 5–8)
PLATELET # BLD: 306 K/UL (ref 135–450)
PMV BLD AUTO: 9.4 FL (ref 5–10.5)
POTASSIUM REFLEX MAGNESIUM: 4.4 MMOL/L (ref 3.5–5.1)
PROTEIN UA: NEGATIVE MG/DL
RBC # BLD: 4.9 M/UL (ref 4–5.2)
S PYO AG THROAT QL: NEGATIVE
SARS-COV-2 RNA, RT PCR: DETECTED
SODIUM BLD-SCNC: 137 MMOL/L (ref 136–145)
SPECIFIC GRAVITY UA: 1.01 (ref 1–1.03)
URINE REFLEX TO CULTURE: NORMAL
URINE TYPE: NORMAL
UROBILINOGEN, URINE: 0.2 E.U./DL
WBC # BLD: 6.4 K/UL (ref 4–11)

## 2022-07-16 PROCEDURE — 87081 CULTURE SCREEN ONLY: CPT

## 2022-07-16 PROCEDURE — 85025 COMPLETE CBC W/AUTO DIFF WBC: CPT

## 2022-07-16 PROCEDURE — 99285 EMERGENCY DEPT VISIT HI MDM: CPT

## 2022-07-16 PROCEDURE — 87880 STREP A ASSAY W/OPTIC: CPT

## 2022-07-16 PROCEDURE — 6360000002 HC RX W HCPCS: Performed by: EMERGENCY MEDICINE

## 2022-07-16 PROCEDURE — 93005 ELECTROCARDIOGRAM TRACING: CPT | Performed by: EMERGENCY MEDICINE

## 2022-07-16 PROCEDURE — 81003 URINALYSIS AUTO W/O SCOPE: CPT

## 2022-07-16 PROCEDURE — 36415 COLL VENOUS BLD VENIPUNCTURE: CPT

## 2022-07-16 PROCEDURE — 80048 BASIC METABOLIC PNL TOTAL CA: CPT

## 2022-07-16 PROCEDURE — 87636 SARSCOV2 & INF A&B AMP PRB: CPT

## 2022-07-16 PROCEDURE — 71046 X-RAY EXAM CHEST 2 VIEWS: CPT

## 2022-07-16 PROCEDURE — 96374 THER/PROPH/DIAG INJ IV PUSH: CPT

## 2022-07-16 PROCEDURE — 2580000003 HC RX 258: Performed by: EMERGENCY MEDICINE

## 2022-07-16 RX ORDER — KETOROLAC TROMETHAMINE 30 MG/ML
15 INJECTION, SOLUTION INTRAMUSCULAR; INTRAVENOUS ONCE
Status: COMPLETED | OUTPATIENT
Start: 2022-07-16 | End: 2022-07-16

## 2022-07-16 RX ORDER — ONDANSETRON 4 MG/1
4 TABLET, FILM COATED ORAL EVERY 8 HOURS PRN
Qty: 20 TABLET | Refills: 0 | Status: SHIPPED | OUTPATIENT
Start: 2022-07-16 | End: 2022-08-05

## 2022-07-16 RX ORDER — SODIUM CHLORIDE, SODIUM LACTATE, POTASSIUM CHLORIDE, AND CALCIUM CHLORIDE .6; .31; .03; .02 G/100ML; G/100ML; G/100ML; G/100ML
1000 INJECTION, SOLUTION INTRAVENOUS ONCE
Status: COMPLETED | OUTPATIENT
Start: 2022-07-16 | End: 2022-07-16

## 2022-07-16 RX ADMIN — KETOROLAC TROMETHAMINE 15 MG: 30 INJECTION, SOLUTION INTRAMUSCULAR; INTRAVENOUS at 14:20

## 2022-07-16 RX ADMIN — SODIUM CHLORIDE, POTASSIUM CHLORIDE, SODIUM LACTATE AND CALCIUM CHLORIDE 1000 ML: 600; 310; 30; 20 INJECTION, SOLUTION INTRAVENOUS at 14:49

## 2022-07-16 ASSESSMENT — ENCOUNTER SYMPTOMS
EYES NEGATIVE: 1
COUGH: 1
SHORTNESS OF BREATH: 0
ALLERGIC/IMMUNOLOGIC NEGATIVE: 1

## 2022-07-16 ASSESSMENT — PAIN DESCRIPTION - DESCRIPTORS: DESCRIPTORS: SORE

## 2022-07-16 ASSESSMENT — PAIN DESCRIPTION - LOCATION: LOCATION: THROAT

## 2022-07-16 ASSESSMENT — PAIN SCALES - GENERAL: PAINLEVEL_OUTOF10: 6

## 2022-07-16 NOTE — ED PROVIDER NOTES
Date of evaluation: 7/16/2022    Chief Complaint   Fever and Pharyngitis      Nursing Notes, Past Medical Hx, Past Surgical Hx, Social Hx, Allergies, and Family Hx were reviewed. History of Present Illness     Bharath Home is a 74 y.o. female who presents with malaise sore throat and cough. This started 2 days ago. She felt feverish. Denies any abdominal pain nausea or vomiting. She has been vaccinated against COVID. No other exposures. Also with sore throat    Review of Systems     Review of Systems   Constitutional:  Positive for activity change, fatigue and fever. Eyes: Negative. Respiratory:  Positive for cough. Negative for shortness of breath. Endocrine: Negative. Genitourinary: Negative. Allergic/Immunologic: Negative. All other systems reviewed and are negative. Past Medical, Surgical, Family, and Social History     She has a past medical history of Asthma, Breast cyst, H/O splenectomy, Lumbar disc disease, Other B-complex deficiencies, Pneumonia, Thoracic or lumbosacral neuritis or radiculitis, unspecified, and Vitamin D deficiency. She has a past surgical history that includes Splenectomy and Tympanostomy tube placement. Her family history includes Cancer in her mother; Heart Attack (age of onset: 61) in her mother; Heart Attack (age of onset: 76) in her father; Stroke in her father and maternal grandmother. She reports that she has been smoking cigarettes. She has a 15.00 pack-year smoking history. She has never used smokeless tobacco. She reports that she does not drink alcohol and does not use drugs. Medications     Previous Medications    CHOLECALCIFEROL (VITAMIN D) 2000 UNITS CAPS CAPSULE    Take 1 capsule by mouth daily. CYANOCOBALAMIN 1000 MCG/ML INJECTION    INJECT 1 ML INTO THE MUSCLE EVERY 30 DAYS       Allergies     She is allergic to erythromycin and sulfa antibiotics.     Physical Exam     INITIAL VITALS: /71   Pulse 80   Temp 98.2 °F (36.8 °C) (Oral)   Resp 18   Ht 5' 3\" (1.6 m)   Wt 131 lb (59.4 kg)   SpO2 97%   BMI 23.21 kg/m²    Physical Exam  Vitals and nursing note reviewed. Constitutional:       Appearance: She is well-developed. HENT:      Head: Normocephalic and atraumatic. Nose: Rhinorrhea present. No congestion. Mouth/Throat:      Mouth: Mucous membranes are moist. No oral lesions. Comments: No erythema to the posterior tonsils no exudate uvula midline no hoarseness or stridor  Eyes:      Conjunctiva/sclera: Conjunctivae normal.   Cardiovascular:      Rate and Rhythm: Normal rate and regular rhythm. Pulses: Normal pulses. Heart sounds: Normal heart sounds. No murmur heard. No friction rub. No gallop. Pulmonary:      Effort: Pulmonary effort is normal.      Breath sounds: No wheezing or rales. Abdominal:      General: Abdomen is flat. There is no distension. Palpations: Abdomen is soft. There is no mass. Musculoskeletal:         General: Normal range of motion. Cervical back: Normal range of motion. Skin:     General: Skin is warm. Capillary Refill: Capillary refill takes less than 2 seconds. Neurological:      General: No focal deficit present. Mental Status: She is alert. Diagnostic Results     EKG   Normal sinus rhythm QTc 431 nonspecific ST-T wave abnormalities no acute ischemia    RADIOLOGY:  XR CHEST (2 VW)   Final Result   1. No acute disease.         See EMR    LABS:   Labs Reviewed   COVID-19 & INFLUENZA COMBO - Abnormal; Notable for the following components:       Result Value    SARS-CoV-2 RNA, RT PCR DETECTED (*)     All other components within normal limits    Narrative:     Valentin Stone tel. [phone],  Microbiology results called to and read back by Facundo Marks, 07/16/2022  16:06, by [facility]   CBC WITH AUTO DIFFERENTIAL - Abnormal; Notable for the following components:    MCV 77.7 (*)     MCH 25.0 (*)     RDW 16.5 (*)     All other components within Paxlovid. Strongly encouraged her to take p.o. and stay hydrated Zofran will be prescribed for nausea. She is return for any difficulty breathing persistent vomiting or any other concerns. She is to follow-up with her primary care physician for recheck return for any worsening symptoms        Clinical Impression     1. ROYCE Ballard     PATIENT REFERRED TO:  Duc Lozano MD  [address] [place] [address]  [phone]      for recheck    DISCHARGE MEDICATIONS:  New Prescriptions    NIRMATRELVIR/RITONAVIR (PAXLOVID) 20 X 150 MG & 10 X 100MG    Take 3 tablets (two 150 mg nirmatrelvir and one 100 mg ritonavir tablets) by mouth every 12 hours for 5 days.     ONDANSETRON (ZOFRAN) 4 MG TABLET    Take 1 tablet by mouth every 8 hours as needed for Nausea       DISPOSITION Discharge - Pending Orders Complete 07/16/2022 04:38:33 PM       Junior Mcclellan MD  07/16/22 1296

## 2022-07-17 LAB
EKG ATRIAL RATE: 68 BPM
EKG DIAGNOSIS: NORMAL
EKG P AXIS: 53 DEGREES
EKG P-R INTERVAL: 144 MS
EKG Q-T INTERVAL: 406 MS
EKG QRS DURATION: 84 MS
EKG QTC CALCULATION (BAZETT): 431 MS
EKG R AXIS: 25 DEGREES
EKG T AXIS: 50 DEGREES
EKG VENTRICULAR RATE: 68 BPM

## 2022-07-18 ENCOUNTER — CARE COORDINATION (OUTPATIENT)
Dept: CARE COORDINATION | Age: 67
End: 2022-07-18

## 2022-07-18 LAB — S PYO THROAT QL CULT: NORMAL

## 2022-07-18 NOTE — CARE COORDINATION
Patient contacted regarding COVID-19 diagnosis. Discussed COVID-19 related testing which was available at this time. Test results were positive. Patient informed of results, if available? Yes. Ambulatory Care Manager contacted the patient by telephone to perform post discharge assessment. Call within 2 business days of discharge: Yes. Verified name and  with patient as identifiers. Provided introduction to self, and explanation of the CTN/ACM role, and reason for call due to risk factors for infection and/or exposure to COVID-19. Symptoms reviewed with patient who verbalized the following symptoms: no new symptoms  no worsening symptoms. Due to no new or worsening symptoms encounter was not routed to provider for escalation. Discussed follow-up appointments. If no appointment was previously scheduled, appointment scheduling offered: No.  [facility] follow up appointment(s): No future appointments. Non-[facility] follow up appointment(s): NA    Non-face-to-face services provided:  Plans to call PCP     Advance Care Planning:   Does patient have an Advance Directive:  reviewed and needs to be updated. Educated patient about risk for severe COVID-19 due to risk factors according to CDC guidelines. ACM reviewed discharge instructions, medical action plan and red flag symptoms with the patient who verbalized understanding. Discussed COVID vaccination status: Yes. Education provided on COVID-19 vaccination as appropriate. Discussed exposure protocols and quarantine with CDC Guidelines. Patient was given an opportunity to verbalize any questions and concerns and agrees to contact ACM or health care provider for questions related to their healthcare. Reviewed and educated patient on any new and changed medications related to discharge diagnosis     Was patient discharged with a pulse oximeter? no      ACM provided contact information.  Plan for follow-up call in 5-7 days based on severity of symptoms and risk factors. .Increase fluids, warm teas, broths, warm mist humidifier use with HOB up,and warm salt water gargles as needed.

## 2022-07-21 ENCOUNTER — CARE COORDINATION (OUTPATIENT)
Dept: CARE COORDINATION | Age: 67
End: 2022-07-21

## 2022-07-21 ENCOUNTER — TELEPHONE (OUTPATIENT)
Dept: INTERNAL MEDICINE CLINIC | Age: 67
End: 2022-07-21

## 2022-07-21 NOTE — TELEPHONE ENCOUNTER
----- Message from [address] sent at 7/21/2022 11:07 AM EDT -----  Subject: Message to Provider    QUESTIONS  Information for Provider? Patient tested COVID+ at ER on 7/16. Would like   call back for advice. ---------------------------------------------------------------------------  --------------  Akosua MANCILLA  [phone]; OK to leave message on voicemail  ---------------------------------------------------------------------------  --------------  SCRIPT ANSWERS  Relationship to Patient?  Self

## 2022-07-21 NOTE — CARE COORDINATION
the patient have 2 or more falls or 1 fall with injury in the past year?: No     Frequent urination at night?: No  Do you use rails/bars?: No  Do you have a non-slip tub mat?: Yes     Are you experiencing loss of meaning?: No  Are you experiencing loss of hope and peace?: No     Thinking about your patient's physical health needs, are there any symptoms or problems (risk indicators) you are unsure about that require further investigation?: No identified areas of uncertainly or problems already being investigated   Are the patients physical health problems impacting on their mental well-being?: No identified areas of concern   Are there any problems with your patients lifestyle behaviors (alcohol, drugs, diet, exercise) that are impacting on physical or mental well-being?: No identified areas of concern   Do you have any other concerns about your patients mental well-being?  How would you rate their severity and impact on the patient?: No identified areas of concern   How would you rate their home environment in terms of safety and stability (including domestic violence, insecure housing, neighbor harassment)?: Consistently safe, supportive, stable, no identified problems   How do daily activities impact on the patient's well-being? (include current or anticipated unemployment, work, caregiving, access to transportation or other): No identified problems or perceived positive benefits   How would you rate their social network (family, work, friends)?: Good participation with social networks   How would you rate their financial resources (including ability to afford all required medical care)?: Financially secure, resources adequate, no identified problems   How wells does the patient now understand their health and well-being (symptoms, signs or risk factors) and what they need to do to manage their health?: Reasonable to good understanding and already engages in managing health or is willing to undertake better management   How well do you think your patient can engage in healthcare discussions? (Barriers include language, deafness, aphasia, alcohol or drug problems, learning difficulties, concentration): Clear and open communication, no identified barriers   Do other services need to be involved to help this patient?: Other care/services not required at this time   Suggested Interventions and Community Resources   Disease Assocation: In Process   Registered Dietician: Not Started   Smoking Cessation: Not Started   Zone Management Tools: In Process                    Prior to Admission medications    Medication Sig Start Date End Date Taking? Authorizing Provider   nirmatrelvir/ritonavir (PAXLOVID) 20 x 150 MG & 10 x 100MG Take 3 tablets (two 150 mg nirmatrelvir and one 100 mg ritonavir tablets) by mouth every 12 hours for 5 days. 7/16/22 7/21/22  Aidan Almaraz MD   ondansetron [facility]) 4 MG tablet Take 1 tablet by mouth every 8 hours as needed for Nausea 7/16/22 8/5/22  Aidan Almaraz MD   cyanocobalamin 1000 MCG/ML injection INJECT 1 ML INTO THE MUSCLE EVERY 30 DAYS 6/16/21   Rizwan Polo MD   Cholecalciferol (VITAMIN D) 2000 UNITS CAPS capsule Take 1 capsule by mouth daily. 1/4/13   Rizwan Polo MD       No future appointments.

## 2022-07-26 ENCOUNTER — CARE COORDINATION (OUTPATIENT)
Dept: CARE COORDINATION | Age: 67
End: 2022-07-26

## 2022-07-26 SDOH — ECONOMIC STABILITY: FOOD INSECURITY: WITHIN THE PAST 12 MONTHS, YOU WORRIED THAT YOUR FOOD WOULD RUN OUT BEFORE YOU GOT MONEY TO BUY MORE.: NEVER TRUE

## 2022-07-26 SDOH — ECONOMIC STABILITY: FOOD INSECURITY: WITHIN THE PAST 12 MONTHS, THE FOOD YOU BOUGHT JUST DIDN'T LAST AND YOU DIDN'T HAVE MONEY TO GET MORE.: NEVER TRUE

## 2022-07-26 ASSESSMENT — SOCIAL DETERMINANTS OF HEALTH (SDOH): HOW HARD IS IT FOR YOU TO PAY FOR THE VERY BASICS LIKE FOOD, HOUSING, MEDICAL CARE, AND HEATING?: NOT HARD AT ALL

## 2022-07-26 NOTE — CARE COORDINATION
RN ACM Note:  Hx: HTN, Spleenectomy, Chronic Obstructive Airway Disease, Bronchitis, Smoker, Cervical DDD, Vitamin D Def      Patient contacted regarding COVID-19 diagnosis. Discussed COVID-19 related testing which was available at this time. Test results were positive. Patient informed of results, if available? Yes, pt was diagnosed on 22    Ambulatory Care Manager contacted the patient by telephone to perform follow-up assessment. Verified name and  with patient as identifiers. Patient has following risk factors of: COPD  Smoker . Symptoms reviewed with patient who verbalized the following symptoms: fatigue  no new symptoms  no worsening symptoms  Sore throat . Pt stated she has been gargling with salt water and using lozenges   Due to no new or worsening symptoms encounter was not routed to provider for escalation. Pt is 10 days from onset of symptoms but still has a sore throat. Instructed pt to stay hydrated. Instructed pt to wear a mask around others for at least 5 days. Educated patient about risk for severe COVID-19 due to risk factors according to CDC guidelines. ACM reviewed discharge instructions, medical action plan and red flag symptoms with the patient who verbalized understanding. Discussed COVID vaccination status: Yes. Education provided on COVID-19 vaccination as appropriate. Discussed exposure protocols and quarantine with CDC Guidelines. Patient was given an opportunity to verbalize any questions and concerns and agrees to contact ACM or health care provider for questions related to their healthcare. Was patient discharged with a pulse oximeter? no , Pt stated her SpO2 was 99%. ACM provided contact information. The pt agreed to a follow up call next month.

## 2022-08-09 ENCOUNTER — CARE COORDINATION (OUTPATIENT)
Dept: CARE COORDINATION | Age: 67
End: 2022-08-09

## 2022-08-16 ENCOUNTER — CARE COORDINATION (OUTPATIENT)
Dept: CARE COORDINATION | Age: 67
End: 2022-08-16

## 2022-08-16 NOTE — CARE COORDINATION
The RN, ACM left a message of final outreach to the pt. The RN, ACM encouraged the pt to call the nurse with any health care issues or needs. The RN, ACM left her contact number.

## 2022-11-07 ENCOUNTER — TELEMEDICINE (OUTPATIENT)
Dept: PRIMARY CARE CLINIC | Age: 67
End: 2022-11-07
Payer: MEDICARE

## 2022-11-07 ENCOUNTER — TELEPHONE (OUTPATIENT)
Dept: INTERNAL MEDICINE CLINIC | Age: 67
End: 2022-11-07

## 2022-11-07 DIAGNOSIS — R05.1 ACUTE COUGH: Primary | ICD-10-CM

## 2022-11-07 DIAGNOSIS — J02.9 SORE THROAT: ICD-10-CM

## 2022-11-07 PROCEDURE — 99213 OFFICE O/P EST LOW 20 MIN: CPT | Performed by: NURSE PRACTITIONER

## 2022-11-07 PROCEDURE — 1123F ACP DISCUSS/DSCN MKR DOCD: CPT | Performed by: NURSE PRACTITIONER

## 2022-11-07 RX ORDER — DEXTROMETHORPHAN HYDROBROMIDE AND PROMETHAZINE HYDROCHLORIDE 15; 6.25 MG/5ML; MG/5ML
5 SYRUP ORAL 4 TIMES DAILY PRN
Qty: 120 ML | Refills: 0 | Status: SHIPPED | OUTPATIENT
Start: 2022-11-07

## 2022-11-07 RX ORDER — BENZONATATE 100 MG/1
100 CAPSULE ORAL 3 TIMES DAILY PRN
Qty: 30 CAPSULE | Refills: 0 | Status: SHIPPED | OUTPATIENT
Start: 2022-11-07 | End: 2022-11-17

## 2022-11-07 ASSESSMENT — ENCOUNTER SYMPTOMS
SORE THROAT: 1
COUGH: 1

## 2022-11-07 NOTE — PROGRESS NOTES
Terry Osullivan (:  1955) is a Established patient, here for evaluation of the following:Sore throat with swollen glands, sweats, headaches, productive cough with green drainage since Thursday. Exposed to RSV Has not tested for [address]   Below is the assessment and plan developed based on review of pertinent history, physical exam, labs, studies, and medications. 1. Acute cough  Problem  -     COVID-19; Future  -     benzonatate (TESSALON) 100 MG capsule; Take 1 capsule by mouth 3 times daily as needed for Cough, Disp-30 capsule, R-0Normal  -     promethazine-dextromethorphan (PROMETHAZINE-DM) 6.25-15 MG/5ML syrup; Take 5 mLs by mouth 4 times daily as needed for Cough, Disp-120 mL, R-0Normal  See patient instructions    2. Sore throat  Problem  -     POCT rapid strep A; Future  -     STREP SCREEN GROUP A THROAT; Future  -     Culture, Throat; Future  See patient instructions      She was instructed to go to the [facility] outpatient lab tomorrow morning to complete the above tests. We will call results when we get these. If it is positive for strep we will treat with an antibiotic  If she tests positive for COVID it was explained to her that this is a virus and antibiotics will not help that. She verbalized understanding of this    Follow-up with PCP if symptoms do not improve or if they worsen        Subjective   This is a 53-year-old female patient of Dr. Clarke Gill consenting to a virtual visit  She has complaints of a sore throat/swollen glands, sweats, headache, productive cough with green drainage since Thursday. She states that she was exposed to RSV by her grandchild. She did not do an at home COVID test and she has not treated her symptoms with anything over-the-counter. She denies no other symptoms but what stated above    Review of Systems   HENT:  Positive for sore throat. Respiratory:  Positive for cough. Neurological:  Positive for headaches.    All other systems reviewed and are negative. Objective   Patient-Reported Vitals  Patient-Reported Temperature: 98.4  Patient-Reported Weight: 135 pounds     Physical Exam  [INSTRUCTIONS:  \"[x]\" Indicates a positive item  \"[]\" Indicates a negative item  -- DELETE ALL ITEMS NOT EXAMINED]    Constitutional: [x] Appears well-developed and well-nourished [x] No apparent distress      [] Abnormal -     Mental status: [x] Alert and awake  [x] Oriented to person/place/time [x] Able to follow commands    [] Abnormal -     Eyes:   EOM    [x]  Normal    [] Abnormal -   Sclera  [x]  Normal    [] Abnormal -          Discharge [x]  None visible   [] Abnormal -     HENT: [x] Normocephalic, atraumatic  [] Abnormal -   [] Mouth/Throat: Mucous membranes are moist    External Ears [] Normal  [] Abnormal -    Neck: [x] No visualized mass [] Abnormal -     Pulmonary/Chest: [x] Respiratory effort normal   [x] No visualized signs of difficulty breathing or respiratory distress        [] Abnormal -      Musculoskeletal:   [] Normal gait with no signs of ataxia         [x] Normal range of motion of neck        [] Abnormal -     Neurological:        [x] No Facial Asymmetry (Cranial nerve 7 motor function) (limited exam due to video visit)          [] No gaze palsy        [] Abnormal -          Skin:        [x] No significant exanthematous lesions or discoloration noted on facial skin         [] Abnormal -            Psychiatric:       [x] Normal Affect [] Abnormal -             On this date 11/7/2022 I have spent 20 minutes reviewing previous notes, test results and face to face (virtual) with the patient discussing the diagnosis and importance of compliance with the treatment plan as well as documenting on the day of the visit. Brady Mitchell, was evaluated through a synchronous (real-time) audio-video encounter. The patient (or guardian if applicable) is aware that this is a billable service, which includes applicable co-pays.  This Virtual Visit was conducted with patient's (and/or legal guardian's) consent. The visit was conducted pursuant to the emergency declaration under the [facility] [address] authority and the [employer] Act. Patient identification was verified, and a caregiver was present when appropriate. The patient was located at Home: [address],  [place] [address].    [facility] [address] [place]

## 2022-11-07 NOTE — TELEPHONE ENCOUNTER
----- Message from Audria Lennox sent at 11/7/2022  8:38 AM EST -----  Subject: Appointment Request    Reason for Call: Established Patient Appointment needed: Routine Medicare   AWV    QUESTIONS    Reason for appointment request? No appointments available during search     Additional Information for Provider? Pt would like a call back to be   scheduled for annual wellness visit before the end of the year, next   available was end of January, pt. screened green.    ---------------------------------------------------------------------------  --------------  Fede JONAS  [phone]; OK to leave message on voicemail  ---------------------------------------------------------------------------  --------------  SCRIPT ANSWERS  ROYCE Screen: Sherryle Cater

## 2022-11-16 ENCOUNTER — OFFICE VISIT (OUTPATIENT)
Dept: INTERNAL MEDICINE CLINIC | Age: 67
End: 2022-11-16
Payer: MEDICARE

## 2022-11-16 VITALS
TEMPERATURE: 97.5 F | WEIGHT: 128 LBS | SYSTOLIC BLOOD PRESSURE: 156 MMHG | OXYGEN SATURATION: 99 % | BODY MASS INDEX: 22.68 KG/M2 | HEART RATE: 68 BPM | HEIGHT: 63 IN | DIASTOLIC BLOOD PRESSURE: 90 MMHG

## 2022-11-16 DIAGNOSIS — R21 SKIN RASH: ICD-10-CM

## 2022-11-16 PROCEDURE — 3074F SYST BP LT 130 MM HG: CPT | Performed by: STUDENT IN AN ORGANIZED HEALTH CARE EDUCATION/TRAINING PROGRAM

## 2022-11-16 PROCEDURE — 99213 OFFICE O/P EST LOW 20 MIN: CPT | Performed by: STUDENT IN AN ORGANIZED HEALTH CARE EDUCATION/TRAINING PROGRAM

## 2022-11-16 PROCEDURE — 3078F DIAST BP <80 MM HG: CPT | Performed by: STUDENT IN AN ORGANIZED HEALTH CARE EDUCATION/TRAINING PROGRAM

## 2022-11-16 PROCEDURE — 1123F ACP DISCUSS/DSCN MKR DOCD: CPT | Performed by: STUDENT IN AN ORGANIZED HEALTH CARE EDUCATION/TRAINING PROGRAM

## 2022-11-16 RX ORDER — PREDNISONE 1 MG/1
TABLET ORAL
Qty: 40 TABLET | Refills: 0 | Status: SHIPPED | OUTPATIENT
Start: 2022-11-16 | End: 2022-12-02

## 2022-11-16 ASSESSMENT — ENCOUNTER SYMPTOMS
SHORTNESS OF BREATH: 0
COUGH: 0
ABDOMINAL DISTENTION: 0
CONSTIPATION: 0
NAUSEA: 0
WHEEZING: 0
PHOTOPHOBIA: 0
TROUBLE SWALLOWING: 0
VOICE CHANGE: 0
APNEA: 0
DIARRHEA: 0
CHEST TIGHTNESS: 0
VOMITING: 0
ABDOMINAL PAIN: 0
STRIDOR: 0
CHOKING: 0

## 2022-11-16 ASSESSMENT — PATIENT HEALTH QUESTIONNAIRE - PHQ9
SUM OF ALL RESPONSES TO PHQ QUESTIONS 1-9: 0
SUM OF ALL RESPONSES TO PHQ QUESTIONS 1-9: 0
1. LITTLE INTEREST OR PLEASURE IN DOING THINGS: 0
SUM OF ALL RESPONSES TO PHQ9 QUESTIONS 1 & 2: 0
SUM OF ALL RESPONSES TO PHQ QUESTIONS 1-9: 0
2. FEELING DOWN, DEPRESSED OR HOPELESS: 0
SUM OF ALL RESPONSES TO PHQ QUESTIONS 1-9: 0

## 2022-11-16 NOTE — PROGRESS NOTES
[facility] Internal Medicine  Acute visit   2022    Cortney Ojeda (:  1955) is a 74 y.o. female, here for evaluation of the following medical concerns:    Chief Complaint   Patient presents with    Rash     Rash on bilat legs, bilat arms, spreading  x2wks itchy also buttock area        ASSESSMENT/ PLAN:  Skin rash  Pruritic skin rash consistent with atopic dermatitis  Prednisone on taper dose start with 20 mg for 16 days         There are no discontinued medications. Return for Follow-up with Dr. Anna Graves, has schedule appointment in December. HPI  Here for skin rash   Was in [place] about 3 weeks, felt like she might got bug bites  Then when back to [facility] and started having a rash   Now rash spreading on her arm and legs  Very itchy   Used baking soda paste for the itchiness but has significant burning sensation so she stopped  No change in her routine, no new medication or detergent  But that she may because appointment to see dermatologist but will able to get in till March      HISTORIES  Current Outpatient Medications on File Prior to Visit   Medication Sig Dispense Refill    cyanocobalamin 1000 MCG/ML injection INJECT 1 ML INTO THE MUSCLE EVERY 30 DAYS 10 mL 3    Cholecalciferol (VITAMIN D) 2000 UNITS CAPS capsule Take 1 capsule by mouth daily. 30 capsule 0    benzonatate (TESSALON) 100 MG capsule Take 1 capsule by mouth 3 times daily as needed for Cough (Patient not taking: Reported on 2022) 30 capsule 0    promethazine-dextromethorphan (PROMETHAZINE-DM) 6.25-15 MG/5ML syrup Take 5 mLs by mouth 4 times daily as needed for Cough (Patient not taking: Reported on 2022) 120 mL 0     No current facility-administered medications on file prior to visit.       Allergies   Allergen Reactions    Erythromycin     Sulfa Antibiotics      Past Medical History:   Diagnosis Date    Asthma     Breast cyst 2014    H/O splenectomy 2012    Lumbar disc disease 2014    Other B-complex deficiencies     Pneumonia 11/15/2014    Thoracic or lumbosacral neuritis or radiculitis, unspecified     Vitamin D deficiency 2015     Patient Active Problem List   Diagnosis    Vitamin D deficiency    B12 deficiency    H/O splenectomy    Lumbar disc disease    Breast cyst    Essential hypertension    Smoker    Other headache syndrome    Increased pressure in the eye, right    Nephrolithiasis    RBC microcytosis    Skin rash     Past Surgical History:   Procedure Laterality Date    SPLENECTOMY      H/O    TYMPANOSTOMY TUBE PLACEMENT       Social History     Socioeconomic History    Marital status:      Spouse name: Not on file    Number of children: 3    Years of education: Not on file    Highest education level: Not on file   Occupational History    Not on file   Tobacco Use    Smoking status: Every Day     Packs/day: 0.50     Years: 30.00     Pack years: 15.00     Types: Cigarettes     Last attempt to quit: 2016     Years since quittin.8    Smokeless tobacco: Never   Substance and Sexual Activity    Alcohol use: No     Alcohol/week: 0.0 standard drinks    Drug use: No    Sexual activity: Not on file   Other Topics Concern    Not on file   Social History Narrative    Not on file     Social Determinants of Health     Financial Resource Strain: Low Risk     Difficulty of Paying Living Expenses: Not hard at all   Food Insecurity: No Food Insecurity    Worried About Running Out of Food in the Last Year: Never true    Ran Out of Food in the Last Year: Never true   Transportation Needs: No Transportation Needs    Lack of Transportation (Medical): No    Lack of Transportation (Non-Medical):  No   Physical Activity: Not on file   Stress: Not on file   Social Connections: Not on file   Intimate Partner Violence: Not on file   Housing Stability: Not on file      Family History   Problem Relation Age of Onset    Stroke Father     Heart Attack Father 76    Cancer Mother         lymphoma    Heart Attack Mother 61    Stroke Maternal Grandmother        ROS  Review of Systems   Constitutional:  Negative for activity change, appetite change, chills, diaphoresis, fatigue, fever and unexpected weight change. HENT:  Negative for trouble swallowing and voice change. Eyes:  Negative for photophobia and visual disturbance. Respiratory:  Negative for apnea, cough, choking, chest tightness, shortness of breath, wheezing and stridor. Cardiovascular:  Negative for chest pain, palpitations and leg swelling. Gastrointestinal:  Negative for abdominal distention, abdominal pain, constipation, diarrhea, nausea and vomiting. Genitourinary:  Negative for difficulty urinating and dysuria. Skin:  Positive for rash. Negative for wound. Neurological:  Negative for dizziness, weakness and light-headedness. Psychiatric/Behavioral:  Negative for agitation and behavioral problems. PE  Vitals:    11/16/22 1610 11/16/22 1612   BP: (!) 158/90 (!) 156/90   Pulse: 68    Temp: 97.5 °F (36.4 °C)    SpO2: 99%    Weight: 128 lb (58.1 kg)    Height: 5' 3\" (1.6 m)      Estimated body mass index is 22.67 kg/m² as calculated from the following:    Height as of this encounter: 5' 3\" (1.6 m). Weight as of this encounter: 128 lb (58.1 kg). Physical Exam  Vitals and nursing note reviewed. Constitutional:       General: She is not in acute distress. Appearance: Normal appearance. She is well-developed. She is not ill-appearing, toxic-appearing or diaphoretic. HENT:      Head: Normocephalic and atraumatic. Eyes:      General: No scleral icterus. Conjunctiva/sclera: Conjunctivae normal.      Pupils: Pupils are equal, round, and reactive to light. Cardiovascular:      Rate and Rhythm: Normal rate and regular rhythm. Pulses: Normal pulses. Heart sounds: Normal heart sounds. No murmur heard. No friction rub. No gallop. Pulmonary:      Effort: Pulmonary effort is normal. No respiratory distress. Breath sounds: Normal breath sounds. No wheezing or rales. Chest:      Chest wall: No tenderness. Abdominal:      General: Bowel sounds are normal. There is no distension. Palpations: Abdomen is soft. Tenderness: There is no abdominal tenderness. There is no guarding. Musculoskeletal:         General: No tenderness or deformity. Normal range of motion. Cervical back: Normal range of motion and neck supple. Skin:     General: Skin is warm and dry. Findings: Rash (Pruritic rash on UE and LE bilaterally) present. Neurological:      Mental Status: She is alert and oriented to person, place, and time. Cranial Nerves: No cranial nerve deficit. Sensory: No sensory deficit. Psychiatric:         Behavior: Behavior normal.         Verner Oats, MD    This dictation was generated by voice recognition computer software. Although all attempts are made to edit the dictation for accuracy, there may be errors in the transcription that are not intended.

## 2022-11-16 NOTE — ASSESSMENT & PLAN NOTE
Pruritic skin rash consistent with atopic dermatitis  Prednisone on taper dose start with 20 mg for 16 days

## 2022-12-04 SDOH — HEALTH STABILITY: PHYSICAL HEALTH: ON AVERAGE, HOW MANY MINUTES DO YOU ENGAGE IN EXERCISE AT THIS LEVEL?: 60 MIN

## 2022-12-04 SDOH — HEALTH STABILITY: PHYSICAL HEALTH: ON AVERAGE, HOW MANY DAYS PER WEEK DO YOU ENGAGE IN MODERATE TO STRENUOUS EXERCISE (LIKE A BRISK WALK)?: 7 DAYS

## 2022-12-04 ASSESSMENT — LIFESTYLE VARIABLES
HOW OFTEN DO YOU HAVE A DRINK CONTAINING ALCOHOL: NEVER
HOW MANY STANDARD DRINKS CONTAINING ALCOHOL DO YOU HAVE ON A TYPICAL DAY: 0
HOW MANY STANDARD DRINKS CONTAINING ALCOHOL DO YOU HAVE ON A TYPICAL DAY: PATIENT DOES NOT DRINK
HOW OFTEN DO YOU HAVE A DRINK CONTAINING ALCOHOL: 1
HOW OFTEN DO YOU HAVE SIX OR MORE DRINKS ON ONE OCCASION: 1

## 2022-12-15 ENCOUNTER — OFFICE VISIT (OUTPATIENT)
Dept: INTERNAL MEDICINE CLINIC | Age: 67
End: 2022-12-15
Payer: MEDICARE

## 2022-12-15 VITALS
SYSTOLIC BLOOD PRESSURE: 158 MMHG | OXYGEN SATURATION: 99 % | TEMPERATURE: 97.9 F | BODY MASS INDEX: 24.73 KG/M2 | HEIGHT: 61 IN | WEIGHT: 131 LBS | HEART RATE: 79 BPM | DIASTOLIC BLOOD PRESSURE: 104 MMHG

## 2022-12-15 DIAGNOSIS — L85.3 DRY SKIN DERMATITIS: ICD-10-CM

## 2022-12-15 DIAGNOSIS — E55.9 VITAMIN D DEFICIENCY: ICD-10-CM

## 2022-12-15 DIAGNOSIS — I10 ESSENTIAL HYPERTENSION: ICD-10-CM

## 2022-12-15 DIAGNOSIS — Z23 NEED FOR PNEUMOCOCCAL VACCINATION: ICD-10-CM

## 2022-12-15 DIAGNOSIS — Z00.00 PE (PHYSICAL EXAM), ANNUAL: Primary | ICD-10-CM

## 2022-12-15 DIAGNOSIS — Z90.81 H/O SPLENECTOMY: ICD-10-CM

## 2022-12-15 DIAGNOSIS — Z23 NEED FOR INFLUENZA VACCINATION: ICD-10-CM

## 2022-12-15 DIAGNOSIS — E53.8 B12 DEFICIENCY: ICD-10-CM

## 2022-12-15 DIAGNOSIS — R21 SKIN RASH: ICD-10-CM

## 2022-12-15 PROCEDURE — G0008 ADMIN INFLUENZA VIRUS VAC: HCPCS | Performed by: INTERNAL MEDICINE

## 2022-12-15 PROCEDURE — 90694 VACC AIIV4 NO PRSRV 0.5ML IM: CPT | Performed by: INTERNAL MEDICINE

## 2022-12-15 PROCEDURE — G0009 ADMIN PNEUMOCOCCAL VACCINE: HCPCS | Performed by: INTERNAL MEDICINE

## 2022-12-15 PROCEDURE — 3074F SYST BP LT 130 MM HG: CPT | Performed by: INTERNAL MEDICINE

## 2022-12-15 PROCEDURE — 90677 PCV20 VACCINE IM: CPT | Performed by: INTERNAL MEDICINE

## 2022-12-15 PROCEDURE — 1123F ACP DISCUSS/DSCN MKR DOCD: CPT | Performed by: INTERNAL MEDICINE

## 2022-12-15 PROCEDURE — 3078F DIAST BP <80 MM HG: CPT | Performed by: INTERNAL MEDICINE

## 2022-12-15 PROCEDURE — G0438 PPPS, INITIAL VISIT: HCPCS | Performed by: INTERNAL MEDICINE

## 2022-12-15 RX ORDER — TRIAMTERENE AND HYDROCHLOROTHIAZIDE 37.5; 25 MG/1; MG/1
1 TABLET ORAL DAILY
Qty: 30 TABLET | Refills: 3 | Status: SHIPPED | OUTPATIENT
Start: 2022-12-15

## 2022-12-15 RX ORDER — CETIRIZINE HYDROCHLORIDE 10 MG/1
10 TABLET ORAL DAILY
Qty: 30 TABLET | Refills: 2 | Status: SHIPPED | OUTPATIENT
Start: 2022-12-15 | End: 2023-01-14

## 2022-12-15 NOTE — PROGRESS NOTES
Follow Up Visit  Established Patient Visit    Patient:  Garrett Puga                                               : 1955  Age: 74 y.o. MRN: [id number]  Date : 12/15/2022      CHIEF COMPLAINT: Garrett Puga is a 74 y.o. female who presents for :       Patient Active Problem List    Diagnosis Date Noted    Dry skin dermatitis 12/15/2022     Priority: Medium    Skin rash 2022     Priority: Medium    RBC microcytosis 2022    Nephrolithiasis     Increased pressure in the eye, right 2018    Smoker 2018    Other headache syndrome 2018    Primary hypertension 2014    Lumbar disc disease 2014    Breast cyst 2014    H/O splenectomy 2012    B12 deficiency 2012    Vitamin D deficiency 2010       Review of Systems    Past Medical History:           Diagnosis Date    Asthma     Breast cyst 2014    Dry skin dermatitis 12/15/2022    H/O splenectomy 2012    Lumbar disc disease 2014    Other B-complex deficiencies     Pneumonia 11/15/2014    Thoracic or lumbosacral neuritis or radiculitis, unspecified     Vitamin D deficiency 2015       Past Surgical History:        Procedure Laterality Date    SPLENECTOMY      H/O    TYMPANOSTOMY TUBE PLACEMENT           Allergies:  Erythromycin and Sulfa antibiotics    Current Medications:    Prior to Admission medications    Medication Sig Start Date End Date Taking? Authorizing Provider   triamterene-hydroCHLOROthiazide [facility]) 37.5-25 MG per tablet Take 1 tablet by mouth daily 12/15/22  Yes Deepak Finney MD   cetirizine (ZYRTEC) 10 MG tablet Take 1 tablet by mouth daily 12/15/22 1/14/23 Yes Deepak Finney MD   cyanocobalamin 1000 MCG/ML injection INJECT 1 ML INTO THE MUSCLE EVERY 30 DAYS 21  Yes Deepak Finney MD   Cholecalciferol (VITAMIN D) 2000 UNITS CAPS capsule Take 1 capsule by mouth daily.  13  Yes Deepak Finney MD           Physical Exam:      Physical Exam    Vitals: BP (!) 158/104 (Site: Left Upper Arm, Position: Sitting, Cuff Size: Large Adult)   Pulse 79   Temp 97.9 °F (36.6 °C) (Temporal)   Ht 5' 1\" (1.549 m)   Wt 131 lb (59.4 kg)   SpO2 99%   BMI 24.75 kg/m²     Body mass index is 24.75 kg/m².   Wt Readings from Last 3 Encounters:   12/15/22 131 lb (59.4 kg)   11/16/22 128 lb (58.1 kg)   07/16/22 131 lb (59.4 kg)         LABS:    CBC:   Lab Results   Component Value Date    WBC 6.4 07/16/2022    HGB 12.2 07/16/2022    HCT 38.1 07/16/2022    MCV 77.7 (L) 07/16/2022     07/16/2022           Lab Results   Component Value Date    IRON 49 02/16/2022    TIBC 360 02/16/2022    FOLATE 9.53 06/01/2012    [id number] 516 11/18/2019                                                             BMP:    Lab Results   Component Value Date     07/16/2022    K 4.4 07/16/2022     07/16/2022    CO2 25 07/16/2022       LFT's:   Lab Results   Component Value Date    ALT 8 (L) 02/09/2022    AST 15 02/09/2022    GGT 23 06/01/2012    ALKPHOS 199 (H) 02/09/2022    BILITOT <0.2 02/09/2022       Lipids:   Lab Results   Component Value Date    CHOL 180 12/12/2020    HDL 38 (L) 12/12/2020    LDLCALC 110 12/12/2020    TRIG 160 (H) 12/12/2020       INR: No results found for: INR, PROTIME    U/A:  Lab Results   Component Value Date    LABMICR Not Indicated 07/16/2022        No results found for: LABA1C     Lab Results   Component Value Date    CREATININE 0.9 07/16/2022       -----------------------------------------------------------------  Health Maintenance:   Pap Smear:   Flex Sig/ Colonoscopy   Mammography   PSA   Immunizations     Assessment/Plan:     Problem List Items Addressed This Visit       B12 deficiency    Relevant Orders    Vitamin D 25 Hydroxy    Dry skin dermatitis    H/O splenectomy    Relevant Orders    CBC with Auto Differential    Comprehensive Metabolic Panel    Lipid Panel    TSH    Urinalysis    Vitamin D 25 Hydroxy    Vitamin B12    Primary hypertension    Skin rash Relevant Orders    CBC with Auto Differential    Comprehensive Metabolic Panel    Lipid Panel    TSH    Urinalysis    Vitamin D 25 Hydroxy    Vitamin B12    Vitamin D deficiency    Relevant Orders    Vitamin D 25 Hydroxy     Other Visit Diagnoses       PE (physical exam), annual    -  Primary    Relevant Orders    CBC with Auto Differential    Comprehensive Metabolic Panel    Lipid Panel    TSH    Urinalysis    Vitamin D 25 Hydroxy    Vitamin B12    DEXA BONE DENSITY 2 SITES    Need for influenza vaccination        Relevant Orders    Influenza, FLUAD, (age 72 y+), IM, Preservative Free, 0.5 mL (Completed)    Need for pneumococcal vaccination        Relevant Orders    Pneumococcal, PCV20, PREVNAR 20, (age 25 yrs+), IM, PF (Completed)            Annual Wellness Visit     Patient:  Giana Espinal                                               : 1955  Age: 74 y.o. MRN: [id number]  Date : 12/15/2022      CHIEF COMPLAINT: Giana Espinal is a 74 y.o. female who presents for : Physical exam    1. Skin rash  Diffuse skin rash diagnosed as eczema secondary to dry skin with overlying neurodermatitis  - CBC with Auto Differential; Future  - Comprehensive Metabolic Panel; Future  - Lipid Panel; Future  - TSH; Future  - Urinalysis; Future  - Vitamin D 25 Hydroxy; Future  - Vitamin B12; Future    2. B12 deficiency  This problem is stable on supplemental B12  - Vitamin D 25 Hydroxy; Future    3. Essential hypertension  History of elevated blood pressure  - CBC with Auto Differential; Future  - Comprehensive Metabolic Panel; Future  - Lipid Panel; Future  - TSH; Future  - Urinalysis; Future  - Vitamin D 25 Hydroxy; Future  - Vitamin B12; Future    4. Vitamin D deficiency    - Vitamin D 25 Hydroxy; Future    5. H/O splenectomy  History of splenectomy to get Prevnar today  - CBC with Auto Differential; Future  - Comprehensive Metabolic Panel; Future  - Lipid Panel; Future  - TSH; Future  - Urinalysis;  Future  - Vitamin D 25 Hydroxy; Future  - Vitamin B12; Future    6. Dry skin dermatitis  Leading to generally feels okay except for pruritus denies any chest pain shortness of breath or any other problems o severe pruritus that is post prednisone    7. PE (physical exam), annual  Currently feels okay denies any chest pain shortness of breath or any other problems  - CBC with Auto Differential; Future  - Comprehensive Metabolic Panel; Future  - Lipid Panel; Future  - TSH; Future  - Urinalysis; Future  - Vitamin D 25 Hydroxy;  Future  - Vitamin B12; Future        Patient Active Problem List    Diagnosis Date Noted    Dry skin dermatitis 12/15/2022     Priority: Medium    Skin rash 11/16/2022     Priority: Medium    RBC microcytosis 02/16/2022    Nephrolithiasis     Increased pressure in the eye, right 12/04/2018    Smoker 11/17/2018    Other headache syndrome 11/17/2018    Primary hypertension 07/21/2014    Lumbar disc disease 04/11/2014    Breast cyst 04/11/2014    H/O splenectomy 08/27/2012    B12 deficiency 06/01/2012    Vitamin D deficiency 07/26/2010       Constitutional:  Denies fever or chills   Eyes:  Denies change in visual acuity   HENT:  Denies nasal congestion or sore throat   Respiratory:  Denies cough or shortness of breath   Cardiovascular:  Denies chest pain or edema   GI:  Denies abdominal pain, nausea, vomiting, bloody stools or diarrhea   :  Denies dysuria   Musculoskeletal:  Denies back pain or joint pain   Integument:  Denies rash   Neurologic:  Denies headache, focal weakness or sensory changes   Endocrine:  Denies polyuria or polydipsia   Lymphatic:  Denies swollen glands   Psychiatric:  Denies depression or anxiety     Past Medical History:        Diagnosis Date    Asthma     Breast cyst 4/11/2014    Dry skin dermatitis 12/15/2022    H/O splenectomy 8/27/2012    Lumbar disc disease 4/11/2014    Other B-complex deficiencies     Pneumonia 11/15/2014    Thoracic or lumbosacral neuritis or radiculitis, unspecified Vitamin D deficiency 2015       Past Surgical History:        Procedure Laterality Date    SPLENECTOMY      H/O    TYMPANOSTOMY TUBE PLACEMENT         Family History:  Family History   Problem Relation Age of Onset    Stroke Father     Heart Attack Father 76    Cancer Mother         lymphoma    Heart Attack Mother 61    Stroke Maternal Grandmother        Social History:  Social History     Socioeconomic History    Marital status:      Spouse name: None    Number of children: 3    Years of education: None    Highest education level: None   Tobacco Use    Smoking status: Every Day     Packs/day: 0.50     Years: 30.00     Pack years: 15.00     Types: Cigarettes     Last attempt to quit: 2016     Years since quittin.9    Smokeless tobacco: Never   Substance and Sexual Activity    Alcohol use: No     Alcohol/week: 0.0 standard drinks    Drug use: No     Social Determinants of Health     Financial Resource Strain: Low Risk     Difficulty of Paying Living Expenses: Not hard at all   Food Insecurity: No Food Insecurity    Worried About Running Out of Food in the Last Year: Never true    Ran Out of Food in the Last Year: Never true   Transportation Needs: No Transportation Needs    Lack of Transportation (Medical): No    Lack of Transportation (Non-Medical): No   Physical Activity: Sufficiently Active    Days of Exercise per Week: 7 days    Minutes of Exercise per Session: 60 min         Allergies:  Erythromycin and Sulfa antibiotics    Current Medications:    Prior to Admission medications    Medication Sig Start Date End Date Taking?  Authorizing Provider   triamterene-hydroCHLOROthiazide [facility]) 37.5-25 MG per tablet Take 1 tablet by mouth daily 12/15/22  Yes Maximo Hankins MD   cetirizine (ZYRTEC) 10 MG tablet Take 1 tablet by mouth daily 12/15/22 1/14/23 Yes Maximo Hankins MD   cyanocobalamin 1000 MCG/ML injection INJECT 1 ML INTO THE MUSCLE EVERY 30 DAYS 21  Yes Maximo Hankins MD Cholecalciferol (VITAMIN D) 2000 UNITS CAPS capsule Take 1 capsule by mouth daily. 1/4/13  Yes Deepak Finney MD           Physical Exam:      Constitutional:  Well developed, well nourished, no acute distress, non-toxic appearance   Eyes:  PERRL, conjunctiva normal   HENT:  Atraumatic, external ears normal, nose normal, oropharynx moist, no pharyngeal exudates. Neck- normal range of motion, no tenderness, supple   Respiratory:  No respiratory distress, normal breath sounds, no rales, no wheezing   Cardiovascular:  Normal rate, normal rhythm, no murmurs, no gallops, no rubs   GI:  Soft, nondistended, normal bowel sounds, nontender, no organomegaly, no mass, no rebound, no guarding   :  No costovertebral angle tenderness   Musculoskeletal:  No edema, no tenderness, no deformities. Back- no tenderness  Integument:  Well hydrated, no rash   Lymphatic:  No lymphadenopathy noted   Neurologic:  Alert & oriented x 3, CN 2-12 normal, normal motor function, normal sensory function, no focal deficits noted   Psychiatric:  Speech and behavior appropriate   Skin exam reveals severe dry skin with overlying neurodermatitis diffusely    Vitals: BP (!) 158/104 (Site: Left Upper Arm, Position: Sitting, Cuff Size: Large Adult)   Pulse 79   Temp 97.9 °F (36.6 °C) (Temporal)   Ht 5' 1\" (1.549 m)   Wt 131 lb (59.4 kg)   SpO2 99%   BMI 24.75 kg/m²     Body mass index is 24.75 kg/m².   Wt Readings from Last 3 Encounters:   12/15/22 131 lb (59.4 kg)   11/16/22 128 lb (58.1 kg)   07/16/22 131 lb (59.4 kg)         LABS:    CBC:   Lab Results   Component Value Date    WBC 6.4 07/16/2022    HGB 12.2 07/16/2022    HCT 38.1 07/16/2022    MCV 77.7 (L) 07/16/2022     07/16/2022           Lab Results   Component Value Date    IRON 49 02/16/2022    TIBC 360 02/16/2022    FOLATE 9.53 06/01/2012    [id number] 516 11/18/2019                                                             BMP:    Lab Results   Component Value Date     07/16/2022    K 4.4 07/16/2022     07/16/2022    CO2 25 07/16/2022       LFT's:   Lab Results   Component Value Date    ALT 8 (L) 02/09/2022    AST 15 02/09/2022    GGT 23 06/01/2012    ALKPHOS 199 (H) 02/09/2022    BILITOT <0.2 02/09/2022       Lipids:   Lab Results   Component Value Date    CHOL 180 12/12/2020    HDL 38 (L) 12/12/2020    LDLCALC 110 12/12/2020    TRIG 160 (H) 12/12/2020       INR: No results found for: INR, PROTIME    U/A:  Lab Results   Component Value Date    LABMICR Not Indicated 07/16/2022        No results found for: LABA1C     Lab Results   Component Value Date    CREATININE 0.9 07/16/2022       -----------------------------------------------------------------     Assessment/Plan:   1. Skin rash  Probably secondary to dry skin and eczema will place on Lac-Hydrin oil of Olay soap and Zyrtec at night  - CBC with Auto Differential; Future  - Comprehensive Metabolic Panel; Future  - Lipid Panel; Future  - TSH; Future  - Urinalysis; Future  - Vitamin D 25 Hydroxy; Future  - Vitamin B12; Future    2. B12 deficiency  This problem is stable check above lab  - Vitamin D 25 Hydroxy; Future    3. Essential hypertension  Will start on Maxide 25 daily and follow-up in 3 months  - CBC with Auto Differential; Future  - Comprehensive Metabolic Panel; Future  - Lipid Panel; Future  - TSH; Future  - Urinalysis; Future  - Vitamin D 25 Hydroxy; Future  - Vitamin B12; Future    4. Vitamin D deficiency  Check above labs  - Vitamin D 25 Hydroxy; Future    5. H/O splenectomy  To get Prevnar today as well as flu shot  - CBC with Auto Differential; Future  - Comprehensive Metabolic Panel; Future  - Lipid Panel; Future  - TSH; Future  - Urinalysis; Future  - Vitamin D 25 Hydroxy; Future  - Vitamin B12; Future    6. Dry skin dermatitis  As above start Lac-Hydrin and Zyrtec    7.  PE (physical exam), annual  Check screening labs she is up-to-date on her immunizations she is to get a flu shot and pneumonia shot and will also get a DEXA scan as well as mammogram  - CBC with Auto Differential; Future  - Comprehensive Metabolic Panel; Future  - Lipid Panel; Future  - TSH; Future  - Urinalysis; Future  - Vitamin D 25 Hydroxy;  Future  - Vitamin B12; Future

## 2022-12-15 NOTE — PROGRESS NOTES
[facility]  Medicare Annual Wellness Visit    Kristi Chakraborty is here for Medicare AWV         No follow-ups on file. Subjective       Patient's complete Health Risk Assessment and screening values have been reviewed and are found in Flowsheets. The following problems were reviewed today and where indicated follow up appointments were made and/or referrals ordered.     Positive Risk Factor Screenings with Interventions:               General HRA Questions:  Select all that apply: (!) New or Increased Pain    Pain Interventions:  Patient declined any further interventions or treatment       Weight and Activity:  Physical Activity: Sufficiently Active    Days of Exercise per Week: 7 days    Minutes of Exercise per Session: 60 min     On average, how many days per week do you engage in moderate to strenuous exercise (like a brisk walk)?: 7 days  Have you lost any weight without trying in the past 3 months?: No  Body mass index: 24.75        Dentist Screen:  Have you seen the dentist within the past year?: (!) No    Intervention:  Patient declines any further evaluation or treatment    Hearing Screen:  Do you or your family notice any trouble with your hearing that hasn't been managed with hearing aids?: (!) Yes    Interventions:  Patient declines any further evaluation or treatment     Safety:  Do you have either shower bars, grab bars, non-slip mats or non-slip surfaces in your shower or bathtub?: (!) No  Do all of your stairways have a railing or banister?: (!) No    Interventions:  Patient declined any further interventions or treatment     Advanced Directives:  Do you have a Living Will?: (!) No    Intervention:  has NO advanced directive - not interested in additional information        Tobacco Use:  Tobacco Use: High Risk    Smoking Tobacco Use: Every Day    Smokeless Tobacco Use: Never    Passive Exposure: Not on file     E-cigarette/Vaping       Questions Responses    E-cigarette/Vaping Use     Start Date Passive Exposure     Quit Date     Counseling Given     Comments           Interventions:  Patient declined any further intervention or treatment                        Objective   Vitals:    12/15/22 0948   BP: (!) 158/104   Site: Left Upper Arm   Position: Sitting   Cuff Size: Large Adult   Pulse: 79   Temp: 97.9 °F (36.6 °C)   TempSrc: Temporal   SpO2: 99%   Weight: 131 lb (59.4 kg)   Height: 5' 1\" (1.549 m)      Body mass index is 24.75 kg/m². Allergies   Allergen Reactions    Erythromycin     Sulfa Antibiotics      Prior to Visit Medications    Medication Sig Taking? Authorizing Provider   cyanocobalamin 1000 MCG/ML injection INJECT 1 ML INTO THE MUSCLE EVERY 30 DAYS Yes Criss Flores MD   Cholecalciferol (VITAMIN D) 2000 UNITS CAPS capsule Take 1 capsule by mouth daily.  Yes Criss Flores MD       [facility] (Including outside providers/suppliers regularly involved in providing care):   Patient Care Team:  Criss Flores MD as PCP - General (Internal Medicine)  Criss Flores MD as PCP - [facility] Empaneled Provider     Reviewed and updated this visit:  Tobacco  Allergies  Meds  Med Hx  Surg Hx  Soc Hx  Fam Hx

## 2022-12-20 ENCOUNTER — TELEPHONE (OUTPATIENT)
Dept: INTERNAL MEDICINE CLINIC | Age: 67
End: 2022-12-20

## 2022-12-20 DIAGNOSIS — M81.0 OSTEOPOROSIS, UNSPECIFIED OSTEOPOROSIS TYPE, UNSPECIFIED PATHOLOGICAL FRACTURE PRESENCE: Primary | ICD-10-CM

## 2022-12-20 NOTE — TELEPHONE ENCOUNTER
Pt dexa wont pass medical necessity with a Z code saying physical exam.    Metapausal state, or osteoarthritis would work

## 2023-01-18 DIAGNOSIS — E53.8 B12 DEFICIENCY: ICD-10-CM

## 2023-01-18 DIAGNOSIS — I10 ESSENTIAL HYPERTENSION: ICD-10-CM

## 2023-01-18 DIAGNOSIS — E55.9 VITAMIN D DEFICIENCY: ICD-10-CM

## 2023-01-18 DIAGNOSIS — R21 SKIN RASH: ICD-10-CM

## 2023-01-18 DIAGNOSIS — Z00.00 PE (PHYSICAL EXAM), ANNUAL: ICD-10-CM

## 2023-01-18 DIAGNOSIS — Z90.81 H/O SPLENECTOMY: ICD-10-CM

## 2023-01-18 LAB
A/G RATIO: 1 (ref 1.1–2.2)
ALBUMIN SERPL-MCNC: 3.9 G/DL (ref 3.4–5)
ALP BLD-CCNC: 180 U/L (ref 40–129)
ALT SERPL-CCNC: 8 U/L (ref 10–40)
ANION GAP SERPL CALCULATED.3IONS-SCNC: 10 MMOL/L (ref 3–16)
AST SERPL-CCNC: 15 U/L (ref 15–37)
BACTERIA: ABNORMAL /HPF
BASOPHILS ABSOLUTE: 0.1 K/UL (ref 0–0.2)
BASOPHILS RELATIVE PERCENT: 0.6 %
BILIRUB SERPL-MCNC: 0.3 MG/DL (ref 0–1)
BILIRUBIN URINE: NEGATIVE
BLOOD, URINE: NEGATIVE
BUN BLDV-MCNC: 11 MG/DL (ref 7–20)
CALCIUM OXALATE CRYSTALS: PRESENT
CALCIUM SERPL-MCNC: 10.3 MG/DL (ref 8.3–10.6)
CHLORIDE BLD-SCNC: 103 MMOL/L (ref 99–110)
CHOLESTEROL, TOTAL: 189 MG/DL (ref 0–199)
CLARITY: ABNORMAL
CO2: 27 MMOL/L (ref 21–32)
COLOR: YELLOW
CREAT SERPL-MCNC: 0.7 MG/DL (ref 0.6–1.2)
EOSINOPHILS ABSOLUTE: 0 K/UL (ref 0–0.6)
EOSINOPHILS RELATIVE PERCENT: 0.2 %
EPITHELIAL CELLS, UA: 5 /HPF (ref 0–5)
GFR SERPL CREATININE-BSD FRML MDRD: >60 ML/MIN/{1.73_M2}
GLUCOSE BLD-MCNC: 102 MG/DL (ref 70–99)
GLUCOSE URINE: NEGATIVE MG/DL
HCT VFR BLD CALC: 41.6 % (ref 36–48)
HDLC SERPL-MCNC: 43 MG/DL (ref 40–60)
HEMOGLOBIN: 12.8 G/DL (ref 12–16)
HYALINE CASTS: 1 /LPF (ref 0–8)
KETONES, URINE: ABNORMAL MG/DL
LDL CHOLESTEROL CALCULATED: 133 MG/DL
LEUKOCYTE ESTERASE, URINE: NEGATIVE
LYMPHOCYTES ABSOLUTE: 3.3 K/UL (ref 1–5.1)
LYMPHOCYTES RELATIVE PERCENT: 20.8 %
MCH RBC QN AUTO: 23.7 PG (ref 26–34)
MCHC RBC AUTO-ENTMCNC: 30.8 G/DL (ref 31–36)
MCV RBC AUTO: 77 FL (ref 80–100)
MICROSCOPIC EXAMINATION: YES
MONOCYTES ABSOLUTE: 0.7 K/UL (ref 0–1.3)
MONOCYTES RELATIVE PERCENT: 4.4 %
MUCUS: PRESENT
NEUTROPHILS ABSOLUTE: 11.7 K/UL (ref 1.7–7.7)
NEUTROPHILS RELATIVE PERCENT: 74 %
NITRITE, URINE: NEGATIVE
PDW BLD-RTO: 15.8 % (ref 12.4–15.4)
PH UA: 6 (ref 5–8)
PLATELET # BLD: 505 K/UL (ref 135–450)
PMV BLD AUTO: 9.4 FL (ref 5–10.5)
POTASSIUM SERPL-SCNC: 4.7 MMOL/L (ref 3.5–5.1)
PROTEIN UA: NEGATIVE MG/DL
RBC # BLD: 5.4 M/UL (ref 4–5.2)
RBC UA: 3 /HPF (ref 0–4)
SODIUM BLD-SCNC: 140 MMOL/L (ref 136–145)
SPECIFIC GRAVITY UA: 1.02 (ref 1–1.03)
TOTAL PROTEIN: 7.7 G/DL (ref 6.4–8.2)
TRIGL SERPL-MCNC: 63 MG/DL (ref 0–150)
TSH SERPL DL<=0.05 MIU/L-ACNC: 1.1 UIU/ML (ref 0.27–4.2)
URINE TYPE: ABNORMAL
UROBILINOGEN, URINE: 0.2 E.U./DL
VITAMIN B-12: 621 PG/ML (ref 211–911)
VITAMIN D 25-HYDROXY: 17.4 NG/ML
VLDLC SERPL CALC-MCNC: 13 MG/DL
WBC # BLD: 15.8 K/UL (ref 4–11)
WBC UA: 2 /HPF (ref 0–5)

## 2023-01-23 DIAGNOSIS — E55.9 VITAMIN D DEFICIENCY: Primary | ICD-10-CM

## 2023-01-23 DIAGNOSIS — I10 PRIMARY HYPERTENSION: Primary | ICD-10-CM

## 2023-01-23 RX ORDER — ERGOCALCIFEROL 1.25 MG/1
50000 CAPSULE ORAL WEEKLY
Qty: 12 CAPSULE | Refills: 1 | Status: SHIPPED | OUTPATIENT
Start: 2023-01-23

## 2023-01-24 DIAGNOSIS — E53.8 B12 DEFICIENCY: ICD-10-CM

## 2023-01-24 RX ORDER — CYANOCOBALAMIN 1000 UG/ML
INJECTION, SOLUTION INTRAMUSCULAR; SUBCUTANEOUS
Qty: 10 ML | Refills: 0 | Status: SHIPPED | OUTPATIENT
Start: 2023-01-24

## 2023-03-28 ENCOUNTER — APPOINTMENT (OUTPATIENT)
Dept: CT IMAGING | Age: 68
End: 2023-03-28
Payer: MEDICARE

## 2023-03-28 ENCOUNTER — HOSPITAL ENCOUNTER (OUTPATIENT)
Age: 68
Setting detail: OBSERVATION
LOS: 1 days | Discharge: HOME | End: 2023-03-29
Attending: STUDENT IN AN ORGANIZED HEALTH CARE EDUCATION/TRAINING PROGRAM | Admitting: INTERNAL MEDICINE
Payer: MEDICARE

## 2023-03-28 ENCOUNTER — APPOINTMENT (OUTPATIENT)
Dept: GENERAL RADIOLOGY | Age: 68
End: 2023-03-28
Payer: MEDICARE

## 2023-03-28 DIAGNOSIS — R42 VERTIGO: Primary | ICD-10-CM

## 2023-03-28 DIAGNOSIS — M79.642 PAIN OF LEFT HAND: ICD-10-CM

## 2023-03-28 LAB
ALBUMIN SERPL-MCNC: 3.7 G/DL (ref 3.4–5)
ALBUMIN/GLOB SERPL: 1 {RATIO} (ref 1.1–2.2)
ALP SERPL-CCNC: 144 U/L (ref 40–129)
ALT SERPL-CCNC: 7 U/L (ref 10–40)
ANION GAP SERPL CALCULATED.3IONS-SCNC: 8 MMOL/L (ref 3–16)
AST SERPL-CCNC: 16 U/L (ref 15–37)
BASOPHILS # BLD: 0.1 K/UL (ref 0–0.2)
BASOPHILS NFR BLD: 1 %
BILIRUB SERPL-MCNC: <0.2 MG/DL (ref 0–1)
BUN SERPL-MCNC: 8 MG/DL (ref 7–20)
CALCIUM SERPL-MCNC: 9.2 MG/DL (ref 8.3–10.6)
CHLORIDE SERPL-SCNC: 103 MMOL/L (ref 99–110)
CO2 SERPL-SCNC: 27 MMOL/L (ref 21–32)
CREAT SERPL-MCNC: 0.7 MG/DL (ref 0.6–1.2)
DEPRECATED RDW RBC AUTO: 19.2 % (ref 12.4–15.4)
EOSINOPHIL # BLD: 0.3 K/UL (ref 0–0.6)
EOSINOPHIL NFR BLD: 1.9 %
GFR SERPLBLD CREATININE-BSD FMLA CKD-EPI: >60 ML/MIN/{1.73_M2}
GLUCOSE SERPL-MCNC: 123 MG/DL (ref 70–99)
HCT VFR BLD AUTO: 39.2 % (ref 36–48)
HGB BLD-MCNC: 12.5 G/DL (ref 12–16)
INR PPP: 1.01 (ref 0.84–1.16)
LYMPHOCYTES # BLD: 3.7 K/UL (ref 1–5.1)
LYMPHOCYTES NFR BLD: 27.1 %
MCH RBC QN AUTO: 24.3 PG (ref 26–34)
MCHC RBC AUTO-ENTMCNC: 31.9 G/DL (ref 31–36)
MCV RBC AUTO: 76 FL (ref 80–100)
MONOCYTES # BLD: 0.8 K/UL (ref 0–1.3)
MONOCYTES NFR BLD: 6.1 %
NEUTROPHILS # BLD: 8.6 K/UL (ref 1.7–7.7)
NEUTROPHILS NFR BLD: 63.9 %
PLATELET # BLD AUTO: 365 K/UL (ref 135–450)
PMV BLD AUTO: 8.6 FL (ref 5–10.5)
POTASSIUM SERPL-SCNC: 3.7 MMOL/L (ref 3.5–5.1)
PROT SERPL-MCNC: 7.4 G/DL (ref 6.4–8.2)
PROTHROMBIN TIME: 13.4 SEC (ref 11.5–14.8)
RBC # BLD AUTO: 5.16 M/UL (ref 4–5.2)
SODIUM SERPL-SCNC: 138 MMOL/L (ref 136–145)
TROPONIN T SERPL-MCNC: <0.01 NG/ML
WBC # BLD AUTO: 13.5 K/UL (ref 4–11)

## 2023-03-28 PROCEDURE — 70498 CT ANGIOGRAPHY NECK: CPT

## 2023-03-28 PROCEDURE — 6370000000 HC RX 637 (ALT 250 FOR IP): Performed by: STUDENT IN AN ORGANIZED HEALTH CARE EDUCATION/TRAINING PROGRAM

## 2023-03-28 PROCEDURE — 36415 COLL VENOUS BLD VENIPUNCTURE: CPT

## 2023-03-28 PROCEDURE — 99285 EMERGENCY DEPT VISIT HI MDM: CPT

## 2023-03-28 PROCEDURE — 70450 CT HEAD/BRAIN W/O DYE: CPT

## 2023-03-28 PROCEDURE — 84484 ASSAY OF TROPONIN QUANT: CPT

## 2023-03-28 PROCEDURE — 93005 ELECTROCARDIOGRAM TRACING: CPT | Performed by: STUDENT IN AN ORGANIZED HEALTH CARE EDUCATION/TRAINING PROGRAM

## 2023-03-28 PROCEDURE — 6360000004 HC RX CONTRAST MEDICATION: Performed by: STUDENT IN AN ORGANIZED HEALTH CARE EDUCATION/TRAINING PROGRAM

## 2023-03-28 PROCEDURE — 73130 X-RAY EXAM OF HAND: CPT

## 2023-03-28 PROCEDURE — 85610 PROTHROMBIN TIME: CPT

## 2023-03-28 PROCEDURE — 85025 COMPLETE CBC W/AUTO DIFF WBC: CPT

## 2023-03-28 PROCEDURE — 80053 COMPREHEN METABOLIC PANEL: CPT

## 2023-03-28 RX ORDER — ACETAMINOPHEN 325 MG/1
650 TABLET ORAL ONCE
Status: COMPLETED | OUTPATIENT
Start: 2023-03-28 | End: 2023-03-28

## 2023-03-28 RX ADMIN — IOPAMIDOL 75 ML: 755 INJECTION, SOLUTION INTRAVENOUS at 21:42

## 2023-03-28 RX ADMIN — ACETAMINOPHEN 650 MG: 325 TABLET ORAL at 22:56

## 2023-03-28 ASSESSMENT — ENCOUNTER SYMPTOMS
VOMITING: 0
DIARRHEA: 0
NAUSEA: 0
ABDOMINAL PAIN: 0
COUGH: 0
SORE THROAT: 0
SHORTNESS OF BREATH: 0

## 2023-03-28 ASSESSMENT — PAIN DESCRIPTION - ORIENTATION: ORIENTATION: LEFT

## 2023-03-28 ASSESSMENT — PAIN DESCRIPTION - LOCATION: LOCATION: HAND

## 2023-03-28 ASSESSMENT — PAIN SCALES - GENERAL: PAINLEVEL_OUTOF10: 10

## 2023-03-29 ENCOUNTER — APPOINTMENT (OUTPATIENT)
Dept: MRI IMAGING | Age: 68
End: 2023-03-29
Payer: MEDICARE

## 2023-03-29 VITALS
DIASTOLIC BLOOD PRESSURE: 80 MMHG | SYSTOLIC BLOOD PRESSURE: 134 MMHG | TEMPERATURE: 98 F | RESPIRATION RATE: 16 BRPM | BODY MASS INDEX: 24.88 KG/M2 | HEART RATE: 80 BPM | OXYGEN SATURATION: 95 % | WEIGHT: 131.7 LBS

## 2023-03-29 PROBLEM — R42 VERTIGO: Status: ACTIVE | Noted: 2023-03-29

## 2023-03-29 LAB
DEPRECATED RDW RBC AUTO: 19.3 % (ref 12.4–15.4)
EKG ATRIAL RATE: 86 BPM
EKG DIAGNOSIS: NORMAL
EKG P AXIS: 58 DEGREES
EKG P-R INTERVAL: 140 MS
EKG Q-T INTERVAL: 386 MS
EKG QRS DURATION: 76 MS
EKG QTC CALCULATION (BAZETT): 461 MS
EKG R AXIS: 11 DEGREES
EKG T AXIS: 39 DEGREES
EKG VENTRICULAR RATE: 86 BPM
HCT VFR BLD AUTO: 37.4 % (ref 36–48)
HGB BLD-MCNC: 11.7 G/DL (ref 12–16)
MCH RBC QN AUTO: 23.9 PG (ref 26–34)
MCHC RBC AUTO-ENTMCNC: 31.4 G/DL (ref 31–36)
MCV RBC AUTO: 76 FL (ref 80–100)
PLATELET # BLD AUTO: 353 K/UL (ref 135–450)
PMV BLD AUTO: 9 FL (ref 5–10.5)
RBC # BLD AUTO: 4.92 M/UL (ref 4–5.2)
WBC # BLD AUTO: 10.1 K/UL (ref 4–11)

## 2023-03-29 PROCEDURE — 97535 SELF CARE MNGMENT TRAINING: CPT

## 2023-03-29 PROCEDURE — 97166 OT EVAL MOD COMPLEX 45 MIN: CPT

## 2023-03-29 PROCEDURE — A9579 GAD-BASE MR CONTRAST NOS,1ML: HCPCS | Performed by: STUDENT IN AN ORGANIZED HEALTH CARE EDUCATION/TRAINING PROGRAM

## 2023-03-29 PROCEDURE — 6360000004 HC RX CONTRAST MEDICATION: Performed by: STUDENT IN AN ORGANIZED HEALTH CARE EDUCATION/TRAINING PROGRAM

## 2023-03-29 PROCEDURE — 97530 THERAPEUTIC ACTIVITIES: CPT

## 2023-03-29 PROCEDURE — G0378 HOSPITAL OBSERVATION PER HR: HCPCS

## 2023-03-29 PROCEDURE — 70553 MRI BRAIN STEM W/O & W/DYE: CPT

## 2023-03-29 PROCEDURE — 97162 PT EVAL MOD COMPLEX 30 MIN: CPT

## 2023-03-29 PROCEDURE — 97116 GAIT TRAINING THERAPY: CPT

## 2023-03-29 PROCEDURE — 2580000003 HC RX 258: Performed by: STUDENT IN AN ORGANIZED HEALTH CARE EDUCATION/TRAINING PROGRAM

## 2023-03-29 PROCEDURE — 85027 COMPLETE CBC AUTOMATED: CPT

## 2023-03-29 RX ORDER — ENOXAPARIN SODIUM 100 MG/ML
40 INJECTION SUBCUTANEOUS DAILY
Status: DISCONTINUED | OUTPATIENT
Start: 2023-03-29 | End: 2023-03-29 | Stop reason: HOSPADM

## 2023-03-29 RX ORDER — ACETAMINOPHEN 325 MG/1
650 TABLET ORAL EVERY 6 HOURS PRN
Status: DISCONTINUED | OUTPATIENT
Start: 2023-03-29 | End: 2023-03-29 | Stop reason: HOSPADM

## 2023-03-29 RX ORDER — ONDANSETRON 2 MG/ML
4 INJECTION INTRAMUSCULAR; INTRAVENOUS EVERY 6 HOURS PRN
Status: DISCONTINUED | OUTPATIENT
Start: 2023-03-29 | End: 2023-03-29 | Stop reason: HOSPADM

## 2023-03-29 RX ORDER — SODIUM CHLORIDE 0.9 % (FLUSH) 0.9 %
5-40 SYRINGE (ML) INJECTION PRN
Status: DISCONTINUED | OUTPATIENT
Start: 2023-03-29 | End: 2023-03-29 | Stop reason: HOSPADM

## 2023-03-29 RX ORDER — SODIUM CHLORIDE 0.9 % (FLUSH) 0.9 %
5-40 SYRINGE (ML) INJECTION EVERY 12 HOURS SCHEDULED
Status: DISCONTINUED | OUTPATIENT
Start: 2023-03-29 | End: 2023-03-29 | Stop reason: HOSPADM

## 2023-03-29 RX ORDER — POLYETHYLENE GLYCOL 3350 17 G/17G
17 POWDER, FOR SOLUTION ORAL DAILY PRN
Status: DISCONTINUED | OUTPATIENT
Start: 2023-03-29 | End: 2023-03-29 | Stop reason: HOSPADM

## 2023-03-29 RX ORDER — ONDANSETRON 4 MG/1
4 TABLET, ORALLY DISINTEGRATING ORAL EVERY 8 HOURS PRN
Status: DISCONTINUED | OUTPATIENT
Start: 2023-03-29 | End: 2023-03-29 | Stop reason: HOSPADM

## 2023-03-29 RX ORDER — ACETAMINOPHEN 650 MG/1
650 SUPPOSITORY RECTAL EVERY 6 HOURS PRN
Status: DISCONTINUED | OUTPATIENT
Start: 2023-03-29 | End: 2023-03-29 | Stop reason: HOSPADM

## 2023-03-29 RX ORDER — SODIUM CHLORIDE 9 MG/ML
INJECTION, SOLUTION INTRAVENOUS PRN
Status: DISCONTINUED | OUTPATIENT
Start: 2023-03-29 | End: 2023-03-29 | Stop reason: HOSPADM

## 2023-03-29 RX ORDER — TRIAMTERENE AND HYDROCHLOROTHIAZIDE 37.5; 25 MG/1; MG/1
1 TABLET ORAL DAILY
Status: DISCONTINUED | OUTPATIENT
Start: 2023-03-29 | End: 2023-03-29 | Stop reason: HOSPADM

## 2023-03-29 RX ORDER — TRIAMTERENE AND HYDROCHLOROTHIAZIDE 37.5; 25 MG/1; MG/1
1 TABLET ORAL DAILY
Status: CANCELLED | OUTPATIENT
Start: 2023-03-29

## 2023-03-29 RX ORDER — MECLIZINE HCL 12.5 MG/1
12.5 TABLET ORAL 3 TIMES DAILY PRN
Status: DISCONTINUED | OUTPATIENT
Start: 2023-03-29 | End: 2023-03-29 | Stop reason: HOSPADM

## 2023-03-29 RX ORDER — MECLIZINE HCL 12.5 MG/1
12.5 TABLET ORAL 3 TIMES DAILY PRN
Qty: 30 TABLET | Refills: 0 | Status: SHIPPED | OUTPATIENT
Start: 2023-03-29 | End: 2023-04-28

## 2023-03-29 RX ADMIN — GADOTERIDOL 13 ML: 279.3 INJECTION, SOLUTION INTRAVENOUS at 08:59

## 2023-03-29 RX ADMIN — SODIUM CHLORIDE, PRESERVATIVE FREE 10 ML: 5 INJECTION INTRAVENOUS at 09:23

## 2023-03-29 ASSESSMENT — ENCOUNTER SYMPTOMS
ABDOMINAL PAIN: 0
GASTROINTESTINAL NEGATIVE: 1
RHINORRHEA: 0
ABDOMINAL DISTENTION: 0
SORE THROAT: 0
SINUS PRESSURE: 0
DIARRHEA: 0
NAUSEA: 0
TROUBLE SWALLOWING: 0
SHORTNESS OF BREATH: 0
EYES NEGATIVE: 1
ALLERGIC/IMMUNOLOGIC NEGATIVE: 1
COUGH: 1
VOMITING: 0
CONSTIPATION: 0

## 2023-03-29 ASSESSMENT — PAIN SCALES - GENERAL
PAINLEVEL_OUTOF10: 0

## 2023-03-29 NOTE — CARE COORDINATION
Case management is following for any discharge needs. The chart was reviewed. Ms. Juan Douglas is from home with her family. She is independent with self care and functional motility at baseline. It is anticipated she will return home. Please contact case management with any concerns.     Benjamin Echavarria RN  [phone]

## 2023-03-29 NOTE — PLAN OF CARE
Problem: Discharge Planning  Goal: Discharge to home or other facility with appropriate resources  Outcome: Progressing  Flowsheets (Taken 3/29/2023 0200)  Discharge to home or other facility with appropriate resources:   Identify barriers to discharge with patient and caregiver   Arrange for needed discharge resources and transportation as appropriate   Identify discharge learning needs (meds, wound care, etc)     Problem: Safety - Adult  Goal: Free from fall injury  Outcome: Progressing  Flowsheets (Taken 3/29/2023 0211)  Free From Fall Injury:   Instruct family/caregiver on patient safety   Based on caregiver fall risk screen, instruct family/caregiver to ask for assistance with transferring infant if caregiver noted to have fall risk factors

## 2023-03-29 NOTE — ED NOTES
ED TO INPATIENT SBAR HANDOFF    Patient Name: Sanaz Powell   :  1955  79 y.o. MRN:  [id number]  Preferred Name  Viji Dennis  ED Room #:  I23/M64-15  Family/Caregiver Present no   Restraints no   Sitter no   Sepsis Risk Score Sepsis Risk Score: 1.75    Situation  Code Status: Prior No additional code details. Allergies: Erythromycin and Sulfa antibiotics  Weight: Patient Vitals for the past 96 hrs (Last 3 readings):   Weight   23 131 lb 11.2 oz (59.7 kg)     Arrived from: home  Chief Complaint:   Chief Complaint   Patient presents with    Dizziness     Similar to vertigo in the past, but this time had palpitations as well. Sx have come and gone about 6 times since 2PM today. Imaging:   CTA HEAD NECK W CONTRAST   Final Result      1. No large vessel occlusion. 2. No steno-occlusive disease in the carotid arteries and vertebral arteries. 3. Moderate stenosis of the origin of the left subclavian artery   4. 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 CT Head W/O Contrast   Final Result      1. No acute abnormality. 2. Mild chronic small vessel ischemic white matter disease. 3. Right mastoid effusion. XR HAND LEFT (MIN 3 VIEWS)   Final Result      No acute osseous abnormality.         Abnormal labs:   Abnormal Labs Reviewed   CBC WITH AUTO DIFFERENTIAL - Abnormal; Notable for the following components:       Result Value    WBC 13.5 (*)     MCV 76.0 (*)     MCH 24.3 (*)     RDW 19.2 (*)     Neutrophils Absolute 8.6 (*)     All other components within normal limits   COMPREHENSIVE METABOLIC PANEL W/ REFLEX TO MG FOR LOW K - Abnormal; Notable for the following components:    Glucose 123 (*)     Albumin/Globulin Ratio 1.0 (*)     Alkaline Phosphatase 144 (*)     ALT 7 (*)     All other components within normal limits     Critical values: no     Abnormal Assessment Findings: Vertigo, PCP wants admitted for

## 2023-03-29 NOTE — DISCHARGE SUMMARY
INTERNAL MEDICINE DEPARTMENT AT [address] [place] [address]  DISCHARGE SUMMARY    Patient ID: Jamar Hancock                                             Discharge Date: 3/29/2023   Patient's PCP: Rizwan Polo MD                                          Discharge Physician: Karolyn Hernandez MD  Admit Date: 3/28/2023   Admitting Physician: Rizwan Polo MD    DISCHARGE DIAGNOSES:  -Vertigo     Hospital Course:      Jamar Hancock is a 79 y.o. 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 The onset was sudden without any notable provoking factors or preceding symptoms. It was described as room spinning, with multiple episodes lasting for 2-3 minutes for about 1-2 hours. Patient has endorsed chronic right sided hearing loss but otherwise denied any other symptoms such as right ear drainage, tinnitus or fullness. In the ED, patient was afebrile and stable but hypertensive. CT/CTA were negative for acute intracranial abnormalities/large vessel occlusions but showed right mastoid effusion. Patient was admitted for further management. MRI was done on which showed no acute abnormality but showed chronic right otomastoiditis. Patient was started on meclizine 12.5 mg 3 times daily as needed. Patient's symptoms improved and she was stable for discharge. Recommend outpatient follow-up with PCP and ENT within 1 week. On day of discharge, patient denied any headaches, lightheadedness/dizziness, cough, shortness of breath, chest pain, nausea/vomiting, abdominal pain, diarrhea or constipation. Patient's status improved. Patient was stable for discharge and agreeable to the plan. Please follow-up as outlined.     Physical Exam:  /80   Pulse 80   Temp 98 °F (36.7 °C) (Oral)   Resp 16   Wt 131 lb 11.2 oz (59.7 kg)   SpO2 95%   BMI 24.88 kg/m²   General appearance: alert,

## 2023-03-29 NOTE — DISCHARGE INSTRUCTIONS
-Please follow-up with your PCP and ENT within 1 week  -You can take meclizine 12.5mg three times daily as needed for dizziness  -Please take all other medications as prescribed  - stay hydrated   - wear wrist splint

## 2023-03-29 NOTE — H&P
8/27/2012    Lumbar disc disease 4/11/2014    Other B-complex deficiencies     Pneumonia 11/15/2014    Thoracic or lumbosacral neuritis or radiculitis, unspecified     Vitamin D deficiency 4/28/2015       Past Surgical History:        Procedure Laterality Date    SPLENECTOMY      H/O    TYMPANOSTOMY TUBE PLACEMENT         Medications Priorto Admission:    Not in a hospital admission. Allergies:  Erythromycin and Sulfa antibiotics    Social History:   TOBACCO:   reports that she has been smoking cigarettes. She has a 15.00 pack-year smoking history. She has never used smokeless tobacco.  ETOH:   reports no history of alcohol use. DRUGS : none  Patient currently lives with family     Family History:       Problem Relation Age of Onset    Stroke Father     Heart Attack Father 76    Cancer Mother         lymphoma    Heart Attack Mother 61    Stroke Maternal Grandmother        Review of Systems   Constitutional:  Negative for appetite change, chills and fever. HENT:  Negative for congestion, ear discharge, ear pain, rhinorrhea, sneezing, sore throat, tinnitus and trouble swallowing. Respiratory:  Positive for cough. Negative for shortness of breath. Cardiovascular:  Negative for chest pain, palpitations and leg swelling. Gastrointestinal:  Negative for abdominal distention, abdominal pain, constipation, diarrhea, nausea and vomiting. Genitourinary:  Negative for dysuria. Neurological:  Positive for dizziness. Negative for tremors, speech difficulty, weakness and numbness. ROS: A 10 point review of systems was conducted, significant findings as noted in HPI. Physical Exam  Constitutional:       General: She is not in acute distress. Appearance: Normal appearance. She is not ill-appearing or toxic-appearing. HENT:      Head: Normocephalic and atraumatic. Right Ear: External ear normal.      Mouth/Throat:      Mouth: Mucous membranes are moist.      Pharynx: Oropharynx is clear.  No

## 2023-03-30 ENCOUNTER — TELEPHONE (OUTPATIENT)
Dept: INTERNAL MEDICINE CLINIC | Age: 68
End: 2023-03-30

## 2023-03-30 NOTE — TELEPHONE ENCOUNTER
Care Transitions Initial Follow Up Call    Outreach made within 2 business days of discharge: Yes    Patient: Brandy Duty Patient : 1955   MRN: [id number]  Reason for Admission: There are no discharge diagnoses documented for the most recent discharge. Discharge Date: 3/29/23       Spoke with: MEAGHAN I left a voice mail    Discharge department/facility: [facility]      Scheduled appointment with PCP within 7-14 days    Follow Up  No future appointments.     Emma Doss MA

## 2023-03-31 ENCOUNTER — TELEPHONE (OUTPATIENT)
Dept: INTERNAL MEDICINE CLINIC | Age: 68
End: 2023-03-31

## 2023-03-31 NOTE — TELEPHONE ENCOUNTER
Care Transitions Initial Follow Up Call    Outreach made within 2 business days of discharge: Yes    Patient: Geetha Solares Patient : 1955   MRN: [id number]  Reason for Admission: There are no discharge diagnoses documented for the most recent discharge. Discharge Date: 3/29/23       Spoke with: MEAGHAN I left a second voice mail    Discharge department/facility: [facility]      Scheduled appointment with PCP within 7-14 days    Follow Up  No future appointments.     Sariah Lo MA

## 2023-06-02 ENCOUNTER — OFFICE VISIT (OUTPATIENT)
Dept: ENT CLINIC | Age: 68
End: 2023-06-02
Payer: MEDICARE

## 2023-06-02 VITALS
TEMPERATURE: 97.4 F | DIASTOLIC BLOOD PRESSURE: 73 MMHG | BODY MASS INDEX: 22.45 KG/M2 | WEIGHT: 122 LBS | SYSTOLIC BLOOD PRESSURE: 135 MMHG | HEART RATE: 83 BPM | HEIGHT: 62 IN

## 2023-06-02 DIAGNOSIS — H69.80 DYSFUNCTION OF EUSTACHIAN TUBE, UNSPECIFIED LATERALITY: Primary | ICD-10-CM

## 2023-06-02 DIAGNOSIS — H65.90 FLUID LEVEL BEHIND TYMPANIC MEMBRANE, UNSPECIFIED LATERALITY: ICD-10-CM

## 2023-06-02 DIAGNOSIS — R42 VERTIGO: ICD-10-CM

## 2023-06-02 PROCEDURE — 3074F SYST BP LT 130 MM HG: CPT | Performed by: OTOLARYNGOLOGY

## 2023-06-02 PROCEDURE — 99204 OFFICE O/P NEW MOD 45 MIN: CPT | Performed by: OTOLARYNGOLOGY

## 2023-06-02 PROCEDURE — 1123F ACP DISCUSS/DSCN MKR DOCD: CPT | Performed by: OTOLARYNGOLOGY

## 2023-06-02 PROCEDURE — 3078F DIAST BP <80 MM HG: CPT | Performed by: OTOLARYNGOLOGY

## 2023-06-05 RX ORDER — ERGOCALCIFEROL 1.25 MG/1
CAPSULE ORAL
Qty: 12 CAPSULE | Refills: 0 | Status: SHIPPED | OUTPATIENT
Start: 2023-06-05

## 2023-06-08 ENCOUNTER — OFFICE VISIT (OUTPATIENT)
Dept: INTERNAL MEDICINE CLINIC | Age: 68
End: 2023-06-08
Payer: MEDICARE

## 2023-06-08 VITALS
DIASTOLIC BLOOD PRESSURE: 80 MMHG | BODY MASS INDEX: 22.05 KG/M2 | SYSTOLIC BLOOD PRESSURE: 130 MMHG | HEART RATE: 72 BPM | HEIGHT: 62 IN | OXYGEN SATURATION: 97 % | WEIGHT: 119.8 LBS | TEMPERATURE: 97.3 F

## 2023-06-08 DIAGNOSIS — Z01.818 PRE-OP EXAM: ICD-10-CM

## 2023-06-08 DIAGNOSIS — E53.8 B12 DEFICIENCY: ICD-10-CM

## 2023-06-08 DIAGNOSIS — H65.23 BILATERAL CHRONIC SEROUS OTITIS MEDIA: ICD-10-CM

## 2023-06-08 DIAGNOSIS — I10 ESSENTIAL HYPERTENSION: ICD-10-CM

## 2023-06-08 DIAGNOSIS — Z01.818 PRE-OP EXAM: Primary | ICD-10-CM

## 2023-06-08 LAB
ANION GAP SERPL CALCULATED.3IONS-SCNC: 11 MMOL/L (ref 3–16)
BUN SERPL-MCNC: 10 MG/DL (ref 7–20)
CALCIUM SERPL-MCNC: 9.3 MG/DL (ref 8.3–10.6)
CHLORIDE SERPL-SCNC: 104 MMOL/L (ref 99–110)
CO2 SERPL-SCNC: 26 MMOL/L (ref 21–32)
CREAT SERPL-MCNC: 0.7 MG/DL (ref 0.6–1.2)
GFR SERPLBLD CREATININE-BSD FMLA CKD-EPI: >60 ML/MIN/{1.73_M2}
GLUCOSE SERPL-MCNC: 88 MG/DL (ref 70–99)
POTASSIUM SERPL-SCNC: 4.7 MMOL/L (ref 3.5–5.1)
SODIUM SERPL-SCNC: 141 MMOL/L (ref 136–145)

## 2023-06-08 PROCEDURE — 99214 OFFICE O/P EST MOD 30 MIN: CPT | Performed by: INTERNAL MEDICINE

## 2023-06-08 PROCEDURE — 1123F ACP DISCUSS/DSCN MKR DOCD: CPT | Performed by: INTERNAL MEDICINE

## 2023-06-08 PROCEDURE — 3075F SYST BP GE 130 - 139MM HG: CPT | Performed by: INTERNAL MEDICINE

## 2023-06-08 PROCEDURE — 3079F DIAST BP 80-89 MM HG: CPT | Performed by: INTERNAL MEDICINE

## 2023-06-08 RX ORDER — CYANOCOBALAMIN 1000 UG/ML
INJECTION, SOLUTION INTRAMUSCULAR; SUBCUTANEOUS
Qty: 10 ML | Refills: 0 | Status: SHIPPED | OUTPATIENT
Start: 2023-06-08

## 2023-06-08 RX ORDER — TRIAMTERENE AND HYDROCHLOROTHIAZIDE 37.5; 25 MG/1; MG/1
1 TABLET ORAL DAILY
Qty: 30 TABLET | Refills: 3 | Status: SHIPPED | OUTPATIENT
Start: 2023-06-08

## 2023-06-08 SDOH — ECONOMIC STABILITY: INCOME INSECURITY: HOW HARD IS IT FOR YOU TO PAY FOR THE VERY BASICS LIKE FOOD, HOUSING, MEDICAL CARE, AND HEATING?: NOT HARD AT ALL

## 2023-06-08 SDOH — ECONOMIC STABILITY: HOUSING INSECURITY
IN THE LAST 12 MONTHS, WAS THERE A TIME WHEN YOU DID NOT HAVE A STEADY PLACE TO SLEEP OR SLEPT IN A SHELTER (INCLUDING NOW)?: NO

## 2023-06-08 SDOH — ECONOMIC STABILITY: FOOD INSECURITY: WITHIN THE PAST 12 MONTHS, YOU WORRIED THAT YOUR FOOD WOULD RUN OUT BEFORE YOU GOT MONEY TO BUY MORE.: NEVER TRUE

## 2023-06-08 SDOH — ECONOMIC STABILITY: FOOD INSECURITY: WITHIN THE PAST 12 MONTHS, THE FOOD YOU BOUGHT JUST DIDN'T LAST AND YOU DIDN'T HAVE MONEY TO GET MORE.: NEVER TRUE

## 2023-06-08 ASSESSMENT — PATIENT HEALTH QUESTIONNAIRE - PHQ9
2. FEELING DOWN, DEPRESSED OR HOPELESS: 0
SUM OF ALL RESPONSES TO PHQ QUESTIONS 1-9: 0
SUM OF ALL RESPONSES TO PHQ QUESTIONS 1-9: 0
1. LITTLE INTEREST OR PLEASURE IN DOING THINGS: 0
SUM OF ALL RESPONSES TO PHQ QUESTIONS 1-9: 0
SUM OF ALL RESPONSES TO PHQ9 QUESTIONS 1 & 2: 0
SUM OF ALL RESPONSES TO PHQ QUESTIONS 1-9: 0

## 2023-06-08 NOTE — PROGRESS NOTES
Metabolic Panel; Future    2. Bilateral chronic serous otitis media  Unimproved bilateral chronic serous otitis media. Hearing impairment. 3. B12 deficiency  History of B12 deficiency. Continue B12 injections. - cyanocobalamin 1000 MCG/ML injection; inject 1 ml into the muscle every 30 days  Dispense: 10 mL; Refill: 0    4. Essential hypertension  Stable hypertension. Check electrolytes. - triamterene-hydroCHLOROthiazide (MAXZIDE-25) 37.5-25 MG per tablet; Take 1 tablet by mouth daily  Dispense: 30 tablet; Refill: 3      She is medically cleared for surgery and anesthesia.     Copy to Dr. Frank Chun

## 2023-06-14 PROBLEM — H73.892: Status: ACTIVE | Noted: 2023-06-14

## 2023-06-14 PROBLEM — H65.91 FLUID LEVEL BEHIND TYMPANIC MEMBRANE OF RIGHT EAR: Status: ACTIVE | Noted: 2023-06-14

## 2023-07-13 RX ORDER — ERGOCALCIFEROL 1.25 MG/1
CAPSULE ORAL
Qty: 12 CAPSULE | Refills: 0 | Status: SHIPPED | OUTPATIENT
Start: 2023-07-13

## 2023-09-25 RX ORDER — ERGOCALCIFEROL 1.25 MG/1
50000 CAPSULE ORAL WEEKLY
Qty: 12 CAPSULE | Refills: 0 | Status: SHIPPED | OUTPATIENT
Start: 2023-09-25

## 2023-10-08 ENCOUNTER — HOSPITAL ENCOUNTER (EMERGENCY)
Age: 68
Discharge: HOME | End: 2023-10-08
Attending: STUDENT IN AN ORGANIZED HEALTH CARE EDUCATION/TRAINING PROGRAM
Payer: MEDICARE

## 2023-10-08 ENCOUNTER — APPOINTMENT (OUTPATIENT)
Dept: GENERAL RADIOLOGY | Age: 68
End: 2023-10-08
Payer: MEDICARE

## 2023-10-08 VITALS
BODY MASS INDEX: 22.38 KG/M2 | WEIGHT: 121.6 LBS | OXYGEN SATURATION: 99 % | TEMPERATURE: 98.8 F | HEART RATE: 74 BPM | DIASTOLIC BLOOD PRESSURE: 88 MMHG | HEIGHT: 62 IN | SYSTOLIC BLOOD PRESSURE: 148 MMHG | RESPIRATION RATE: 18 BRPM

## 2023-10-08 DIAGNOSIS — J40 BRONCHITIS: ICD-10-CM

## 2023-10-08 DIAGNOSIS — R05.1 ACUTE COUGH: Primary | ICD-10-CM

## 2023-10-08 LAB
FLUAV RNA RESP QL NAA+PROBE: NOT DETECTED
FLUBV RNA RESP QL NAA+PROBE: NOT DETECTED
SARS-COV-2 RNA RESP QL NAA+PROBE: NOT DETECTED

## 2023-10-08 PROCEDURE — 71046 X-RAY EXAM CHEST 2 VIEWS: CPT

## 2023-10-08 PROCEDURE — 87636 SARSCOV2 & INF A&B AMP PRB: CPT

## 2023-10-08 PROCEDURE — 6370000000 HC RX 637 (ALT 250 FOR IP): Performed by: STUDENT IN AN ORGANIZED HEALTH CARE EDUCATION/TRAINING PROGRAM

## 2023-10-08 PROCEDURE — 99284 EMERGENCY DEPT VISIT MOD MDM: CPT

## 2023-10-08 RX ORDER — GUAIFENESIN/DEXTROMETHORPHAN 100-10MG/5
10 SYRUP ORAL 3 TIMES DAILY PRN
Qty: 118 ML | Refills: 0 | Status: SHIPPED | OUTPATIENT
Start: 2023-10-08 | End: 2023-10-18

## 2023-10-08 RX ORDER — GUAIFENESIN/DEXTROMETHORPHAN 100-10MG/5
10 SYRUP ORAL ONCE
Status: COMPLETED | OUTPATIENT
Start: 2023-10-08 | End: 2023-10-08

## 2023-10-08 RX ADMIN — GUAIFENESIN SYRUP AND DEXTROMETHORPHAN 10 ML: 100; 10 SYRUP ORAL at 01:15

## 2023-10-08 ASSESSMENT — PAIN DESCRIPTION - DESCRIPTORS: DESCRIPTORS: ACHING

## 2023-10-08 ASSESSMENT — PAIN DESCRIPTION - LOCATION: LOCATION: THROAT

## 2023-10-08 ASSESSMENT — PAIN DESCRIPTION - PAIN TYPE: TYPE: ACUTE PAIN

## 2023-10-08 ASSESSMENT — LIFESTYLE VARIABLES
HOW OFTEN DO YOU HAVE A DRINK CONTAINING ALCOHOL: NEVER
HOW MANY STANDARD DRINKS CONTAINING ALCOHOL DO YOU HAVE ON A TYPICAL DAY: PATIENT DOES NOT DRINK

## 2023-10-08 ASSESSMENT — PAIN - FUNCTIONAL ASSESSMENT
PAIN_FUNCTIONAL_ASSESSMENT: ACTIVITIES ARE NOT PREVENTED
PAIN_FUNCTIONAL_ASSESSMENT: NONE - DENIES PAIN
PAIN_FUNCTIONAL_ASSESSMENT: 0-10

## 2023-10-08 ASSESSMENT — PAIN DESCRIPTION - FREQUENCY: FREQUENCY: INTERMITTENT

## 2023-10-08 ASSESSMENT — PAIN SCALES - GENERAL: PAINLEVEL_OUTOF10: 7

## 2023-10-08 ASSESSMENT — ENCOUNTER SYMPTOMS: COUGH: 1

## 2023-10-08 NOTE — DISCHARGE INSTRUCTIONS
You were seen for an evaluation of a cough. Please follow-up with your primary care provider tomorrow.

## 2023-10-18 ENCOUNTER — OFFICE VISIT (OUTPATIENT)
Dept: INTERNAL MEDICINE CLINIC | Age: 68
End: 2023-10-18
Payer: MEDICARE

## 2023-10-18 VITALS
HEIGHT: 62 IN | WEIGHT: 120 LBS | HEART RATE: 67 BPM | SYSTOLIC BLOOD PRESSURE: 148 MMHG | BODY MASS INDEX: 22.08 KG/M2 | OXYGEN SATURATION: 97 % | DIASTOLIC BLOOD PRESSURE: 70 MMHG | TEMPERATURE: 97.1 F

## 2023-10-18 DIAGNOSIS — J40 BRONCHITIS: Primary | ICD-10-CM

## 2023-10-18 PROCEDURE — 1123F ACP DISCUSS/DSCN MKR DOCD: CPT | Performed by: STUDENT IN AN ORGANIZED HEALTH CARE EDUCATION/TRAINING PROGRAM

## 2023-10-18 PROCEDURE — 99213 OFFICE O/P EST LOW 20 MIN: CPT | Performed by: STUDENT IN AN ORGANIZED HEALTH CARE EDUCATION/TRAINING PROGRAM

## 2023-10-18 PROCEDURE — 3078F DIAST BP <80 MM HG: CPT | Performed by: STUDENT IN AN ORGANIZED HEALTH CARE EDUCATION/TRAINING PROGRAM

## 2023-10-18 PROCEDURE — 3077F SYST BP >= 140 MM HG: CPT | Performed by: STUDENT IN AN ORGANIZED HEALTH CARE EDUCATION/TRAINING PROGRAM

## 2023-10-18 RX ORDER — BENZONATATE 200 MG/1
200 CAPSULE ORAL 3 TIMES DAILY PRN
Qty: 30 CAPSULE | Refills: 0 | Status: SHIPPED | OUTPATIENT
Start: 2023-10-18 | End: 2023-10-25

## 2023-10-18 RX ORDER — AZITHROMYCIN 250 MG/1
250 TABLET, FILM COATED ORAL SEE ADMIN INSTRUCTIONS
Qty: 6 TABLET | Refills: 0 | Status: SHIPPED | OUTPATIENT
Start: 2023-10-18 | End: 2023-10-23

## 2023-10-18 NOTE — ASSESSMENT & PLAN NOTE
No wheezing on lung exam.  COVID-19 test with negative  Will treat with Zpack   Tessalon jia PRN for cough

## 2023-12-26 RX ORDER — ERGOCALCIFEROL 1.25 MG/1
50000 CAPSULE ORAL WEEKLY
Qty: 12 CAPSULE | Refills: 0 | Status: SHIPPED | OUTPATIENT
Start: 2023-12-26

## 2024-09-07 ENCOUNTER — APPOINTMENT (OUTPATIENT)
Dept: GENERAL RADIOLOGY | Age: 69
End: 2024-09-07
Payer: MEDICARE

## 2024-09-07 ENCOUNTER — HOSPITAL ENCOUNTER (EMERGENCY)
Age: 69
Discharge: HOME | End: 2024-09-07
Attending: STUDENT IN AN ORGANIZED HEALTH CARE EDUCATION/TRAINING PROGRAM
Payer: MEDICARE

## 2024-09-07 ENCOUNTER — APPOINTMENT (OUTPATIENT)
Dept: CT IMAGING | Age: 69
End: 2024-09-07
Payer: MEDICARE

## 2024-09-07 VITALS
TEMPERATURE: 98.5 F | WEIGHT: 114.8 LBS | RESPIRATION RATE: 15 BRPM | OXYGEN SATURATION: 95 % | HEART RATE: 80 BPM | DIASTOLIC BLOOD PRESSURE: 90 MMHG | HEIGHT: 62 IN | BODY MASS INDEX: 21.12 KG/M2 | SYSTOLIC BLOOD PRESSURE: 153 MMHG

## 2024-09-07 DIAGNOSIS — J18.9 PNEUMONIA OF RIGHT MIDDLE LOBE DUE TO INFECTIOUS ORGANISM: Primary | ICD-10-CM

## 2024-09-07 LAB
ALBUMIN SERPL-MCNC: 3.3 G/DL (ref 3.4–5)
ALBUMIN/GLOB SERPL: 0.7 {RATIO} (ref 1.1–2.2)
ALP SERPL-CCNC: 175 U/L (ref 40–129)
ALT SERPL-CCNC: <5 U/L (ref 10–40)
ANION GAP SERPL CALCULATED.3IONS-SCNC: 10 MMOL/L (ref 3–16)
AST SERPL-CCNC: 15 U/L (ref 15–37)
BASOPHILS # BLD: 0.4 K/UL (ref 0–0.2)
BASOPHILS NFR BLD: 2 %
BILIRUB SERPL-MCNC: 0.4 MG/DL (ref 0–1)
BUN SERPL-MCNC: 9 MG/DL (ref 7–20)
CALCIUM SERPL-MCNC: 9.1 MG/DL (ref 8.3–10.6)
CHLORIDE SERPL-SCNC: 102 MMOL/L (ref 99–110)
CO2 SERPL-SCNC: 28 MMOL/L (ref 21–32)
CREAT SERPL-MCNC: 0.7 MG/DL (ref 0.6–1.2)
DEPRECATED RDW RBC AUTO: 14.3 % (ref 12.4–15.4)
EOSINOPHIL # BLD: 0 K/UL (ref 0–0.6)
EOSINOPHIL NFR BLD: 0 %
GFR SERPLBLD CREATININE-BSD FMLA CKD-EPI: >90 ML/MIN/{1.73_M2}
GLUCOSE SERPL-MCNC: 137 MG/DL (ref 70–99)
HCT VFR BLD AUTO: 36.6 % (ref 36–48)
HGB BLD-MCNC: 11.6 G/DL (ref 12–16)
LIPASE SERPL-CCNC: 34 U/L (ref 13–60)
LYMPHOCYTES # BLD: 3 K/UL (ref 1–5.1)
LYMPHOCYTES NFR BLD: 15 %
MCH RBC QN AUTO: 25.8 PG (ref 26–34)
MCHC RBC AUTO-ENTMCNC: 31.5 G/DL (ref 31–36)
MCV RBC AUTO: 81.9 FL (ref 80–100)
METAMYELOCYTES NFR BLD MANUAL: 2 %
MICROCYTES BLD QL SMEAR: ABNORMAL
MONOCYTES # BLD: 1.6 K/UL (ref 0–1.3)
MONOCYTES NFR BLD: 8 %
NEUTROPHILS # BLD: 14.9 K/UL (ref 1.7–7.7)
NEUTROPHILS NFR BLD: 73 %
NT-PROBNP SERPL-MCNC: 123 PG/ML (ref 0–124)
PATH INTERP BLD-IMP: NO
PLATELET # BLD AUTO: 511 K/UL (ref 135–450)
PLATELET BLD QL SMEAR: ABNORMAL
PMV BLD AUTO: 8.8 FL (ref 5–10.5)
POTASSIUM SERPL-SCNC: 4.1 MMOL/L (ref 3.5–5.1)
PROT SERPL-MCNC: 7.8 G/DL (ref 6.4–8.2)
RBC # BLD AUTO: 4.47 M/UL (ref 4–5.2)
SLIDE REVIEW: ABNORMAL
SMUDGE CELLS BLD QL SMEAR: PRESENT
SODIUM SERPL-SCNC: 140 MMOL/L (ref 136–145)
SPECIMEN SOURCE: NORMAL
TOXIC GRANULES BLD QL SMEAR: PRESENT
TROPONIN, HIGH SENSITIVITY: 13 NG/L (ref 0–14)
WBC # BLD AUTO: 19.8 K/UL (ref 4–11)

## 2024-09-07 PROCEDURE — 80053 COMPREHEN METABOLIC PANEL: CPT

## 2024-09-07 PROCEDURE — 83690 ASSAY OF LIPASE: CPT

## 2024-09-07 PROCEDURE — 99285 EMERGENCY DEPT VISIT HI MDM: CPT

## 2024-09-07 PROCEDURE — 93005 ELECTROCARDIOGRAM TRACING: CPT | Performed by: STUDENT IN AN ORGANIZED HEALTH CARE EDUCATION/TRAINING PROGRAM

## 2024-09-07 PROCEDURE — 85025 COMPLETE CBC W/AUTO DIFF WBC: CPT

## 2024-09-07 PROCEDURE — 6360000002 HC RX W HCPCS: Performed by: STUDENT IN AN ORGANIZED HEALTH CARE EDUCATION/TRAINING PROGRAM

## 2024-09-07 PROCEDURE — 6360000004 HC RX CONTRAST MEDICATION: Performed by: STUDENT IN AN ORGANIZED HEALTH CARE EDUCATION/TRAINING PROGRAM

## 2024-09-07 PROCEDURE — 84484 ASSAY OF TROPONIN QUANT: CPT

## 2024-09-07 PROCEDURE — 71046 X-RAY EXAM CHEST 2 VIEWS: CPT

## 2024-09-07 PROCEDURE — 71260 CT THORAX DX C+: CPT

## 2024-09-07 PROCEDURE — 96375 TX/PRO/DX INJ NEW DRUG ADDON: CPT

## 2024-09-07 PROCEDURE — 2500000003 HC RX 250 WO HCPCS: Performed by: STUDENT IN AN ORGANIZED HEALTH CARE EDUCATION/TRAINING PROGRAM

## 2024-09-07 PROCEDURE — 83880 ASSAY OF NATRIURETIC PEPTIDE: CPT

## 2024-09-07 PROCEDURE — 96374 THER/PROPH/DIAG INJ IV PUSH: CPT

## 2024-09-07 PROCEDURE — 2580000003 HC RX 258: Performed by: STUDENT IN AN ORGANIZED HEALTH CARE EDUCATION/TRAINING PROGRAM

## 2024-09-07 PROCEDURE — 6370000000 HC RX 637 (ALT 250 FOR IP): Performed by: STUDENT IN AN ORGANIZED HEALTH CARE EDUCATION/TRAINING PROGRAM

## 2024-09-07 RX ORDER — BENZONATATE 100 MG/1
200 CAPSULE ORAL ONCE
Status: COMPLETED | OUTPATIENT
Start: 2024-09-07 | End: 2024-09-07

## 2024-09-07 RX ORDER — FAMOTIDINE 20 MG/1
20 TABLET, FILM COATED ORAL 2 TIMES DAILY
Qty: 60 TABLET | Refills: 3 | Status: SHIPPED | OUTPATIENT
Start: 2024-09-07 | End: 2024-09-12 | Stop reason: ALTCHOICE

## 2024-09-07 RX ORDER — BENZONATATE 100 MG/1
100 CAPSULE ORAL 3 TIMES DAILY PRN
Qty: 30 CAPSULE | Refills: 0 | Status: SHIPPED | OUTPATIENT
Start: 2024-09-07 | End: 2024-09-12 | Stop reason: ALTCHOICE

## 2024-09-07 RX ORDER — PROCHLORPERAZINE EDISYLATE 5 MG/ML
10 INJECTION INTRAMUSCULAR; INTRAVENOUS ONCE
Status: COMPLETED | OUTPATIENT
Start: 2024-09-07 | End: 2024-09-07

## 2024-09-07 RX ORDER — IOPAMIDOL 755 MG/ML
75 INJECTION, SOLUTION INTRAVASCULAR
Status: COMPLETED | OUTPATIENT
Start: 2024-09-07 | End: 2024-09-07

## 2024-09-07 RX ORDER — ONDANSETRON 4 MG/1
4 TABLET, FILM COATED ORAL EVERY 8 HOURS PRN
Qty: 12 TABLET | Refills: 0 | Status: SHIPPED | OUTPATIENT
Start: 2024-09-07 | End: 2024-09-11

## 2024-09-07 RX ADMIN — BENZONATATE 200 MG: 100 CAPSULE ORAL at 18:47

## 2024-09-07 RX ADMIN — FAMOTIDINE 20 MG: 10 INJECTION, SOLUTION INTRAVENOUS at 18:44

## 2024-09-07 RX ADMIN — IOPAMIDOL 75 ML: 755 INJECTION, SOLUTION INTRAVENOUS at 19:12

## 2024-09-07 RX ADMIN — AMOXICILLIN AND CLAVULANATE POTASSIUM 1 TABLET: 875; 125 TABLET, FILM COATED ORAL at 19:09

## 2024-09-07 RX ADMIN — PROCHLORPERAZINE EDISYLATE 10 MG: 5 INJECTION INTRAMUSCULAR; INTRAVENOUS at 18:42

## 2024-09-07 ASSESSMENT — ENCOUNTER SYMPTOMS
EYE REDNESS: 0
SORE THROAT: 0
EYE PAIN: 0
DIARRHEA: 0
TROUBLE SWALLOWING: 0
RHINORRHEA: 0
NAUSEA: 1
SHORTNESS OF BREATH: 1
BACK PAIN: 0
COUGH: 1
BLOOD IN STOOL: 0
ABDOMINAL PAIN: 0
VOMITING: 1

## 2024-09-07 ASSESSMENT — PAIN - FUNCTIONAL ASSESSMENT: PAIN_FUNCTIONAL_ASSESSMENT: NONE - DENIES PAIN

## 2024-09-07 NOTE — ED PROVIDER NOTES
[facility]  EMERGENCY DEPARTMENT ENCOUNTER          ATTENDING PHYSICIAN NOTE       Date of evaluation: 9/7/2024    Chief Complaint     Headache and Cough (Pt has recent dx of bronchitis @ "[employer]s-however, the pounding headache and cough will not go away. States her Flu and COVID were negative.)      History of Present Illness     Myranda Quiroga is a 69 y.o. female who presents to the emergency department for cough headache and abdominal pain and vomiting.  Patient reports that a month ago she was diagnosed with viral syndrome and bronchitis and was treated for this with outpatient antibiotics namely azithromycin.  However she has been having persistent cough which has posttussive emesis associated with it.  She has been having epigastric abdominal pain and has been having intractable cough which is productive of clear sputum.  Reports that she has been having headaches with this and feeling generally unwell.  She has not taken any medications for her symptoms after completing a course of azithromycin.  She completed the regimen prescribed for her for bronchitis without improvement in her symptoms.  Denies fevers or chills, diarrhea abdominal pain chest pain.  Reports that she has had difficulty tolerating oral intake and has been having some nausea after she does not eat much food.  Patient has been taking ibuprofen daily and multiple doses per day for the last 2 weeks to try to assist with her pain    ASSESSMENT / PLAN  (MEDICAL DECISION MAKING)     INITIAL VITALS: BP: (!) 152/87, Temp: 97.9 °F (36.6 °C), Pulse: 90, Respirations: 20, SpO2: 99 %      Myranda Quiroga is a 69 y.o. female who presents emergency department with multiple complaints with namely an intractable cough is been present for 3 weeks after completion of azithromycin therapy and a viral prodrome illness, abdominal pain with nonbloody nonbilious emesis that has been present and is sometimes posttussive but his with her  injection inject 1 ml into the muscle every 30 days, Disp-10 mL, R-0Normal      triamterene-hydroCHLOROthiazide (MAXZIDE-25) 37.5-25 MG per tablet Take 1 tablet by mouth daily, Disp-30 tablet, R-3Normal       !! - Potential duplicate medications found. Please discuss with provider.          Allergies     She is allergic to erythromycin and sulfa antibiotics.    Physical Exam     INITIAL VITALS: BP: (!) 152/87, Temp: 97.9 °F (36.6 °C), Pulse: 90, Respirations: 20, SpO2: 99 %   Physical Exam  Vitals and nursing note reviewed. Exam conducted with a chaperone present.   Constitutional:       General: She is not in acute distress.     Appearance: Normal appearance. She is not ill-appearing.   HENT:      Head: Normocephalic and atraumatic.      Nose: Nose normal.      Mouth/Throat:      Mouth: Mucous membranes are moist.      Pharynx: Oropharynx is clear.   Eyes:      Extraocular Movements: Extraocular movements intact.      Conjunctiva/sclera: Conjunctivae normal.      Pupils: Pupils are equal, round, and reactive to light.   Cardiovascular:      Rate and Rhythm: Normal rate and regular rhythm.      Pulses: Normal pulses.      Heart sounds: Normal heart sounds. No murmur heard.     No friction rub. No gallop.   Pulmonary:      Effort: Pulmonary effort is normal. No respiratory distress.      Breath sounds: Examination of the right-middle field reveals decreased breath sounds. Examination of the right-lower field reveals decreased breath sounds. Decreased breath sounds present. No wheezing, rhonchi or rales.   Abdominal:      General: Abdomen is flat. Bowel sounds are normal.      Palpations: Abdomen is soft.      Tenderness: There is abdominal tenderness in the epigastric area. There is no guarding or rebound.   Musculoskeletal:         General: No swelling, tenderness, deformity or signs of injury. Normal range of motion.      Cervical back: Normal range of motion and neck supple. No muscular tenderness.      Right

## 2024-09-08 LAB
EKG ATRIAL RATE: 74 BPM
EKG DIAGNOSIS: NORMAL
EKG P AXIS: 64 DEGREES
EKG P-R INTERVAL: 138 MS
EKG Q-T INTERVAL: 370 MS
EKG QRS DURATION: 82 MS
EKG QTC CALCULATION (BAZETT): 410 MS
EKG R AXIS: 39 DEGREES
EKG T AXIS: 43 DEGREES
EKG VENTRICULAR RATE: 74 BPM

## 2024-09-08 NOTE — ED NOTES
Patient reviewed and discharged by MD. left at this time to home in care of self in no acute distress after verbalizing understanding of discharge instructions. Patient left after receiving After Visit Summary instructions. IV removed prior to discharge.           Khushi Abad RN  09/07/24 [id number]

## 2024-09-08 NOTE — DISCHARGE INSTRUCTIONS
We saw you in the hospital for cough. You have pneumonia and need to take Augmentin     You were treated with Tessalon for your cough, pepcid for your stomach pain, and zofran for nausea.     You need to take a pill called augmentin twice a day for pneumonia.  You also can take Tessalon Perles for her cough, Pepcid for your stomach and Zofran for nausea.      You need to see your regular doctor in 3-4 days to be checked.     You should return to the emergency department if your symptoms worsen or do not resolve. In addition, return if:  - You have a fever (greater than 101 degrees)  - You have chest pain, shortness of breath, abdominal pain, or uncontrollable vomiting  - You are unable to eat or drink  - You pass out  - You have difficulty moving your arms or legs   - You have difficulty speaking or slurred speech  - Or you have any concern that you feel needs acute physician evaluation.

## 2024-09-09 ENCOUNTER — CARE COORDINATION (OUTPATIENT)
Dept: CARE COORDINATION | Age: 69
End: 2024-09-09

## 2024-09-09 ENCOUNTER — ENROLLMENT (OUTPATIENT)
Dept: CARE COORDINATION | Age: 69
End: 2024-09-09

## 2024-09-10 ENCOUNTER — CARE COORDINATION (OUTPATIENT)
Dept: CARE COORDINATION | Age: 69
End: 2024-09-10

## 2024-09-12 ENCOUNTER — OFFICE VISIT (OUTPATIENT)
Dept: ENT CLINIC | Age: 69
End: 2024-09-12

## 2024-09-12 VITALS
TEMPERATURE: 97.5 F | SYSTOLIC BLOOD PRESSURE: 178 MMHG | BODY MASS INDEX: 20.13 KG/M2 | HEART RATE: 75 BPM | WEIGHT: 113.6 LBS | DIASTOLIC BLOOD PRESSURE: 80 MMHG | HEIGHT: 63 IN

## 2024-09-12 DIAGNOSIS — R09.A2 GLOBUS PHARYNGEUS: ICD-10-CM

## 2024-09-12 DIAGNOSIS — R05.2 SUBACUTE COUGH: Primary | ICD-10-CM

## 2024-09-12 RX ORDER — PANTOPRAZOLE SODIUM 40 MG/1
40 TABLET, DELAYED RELEASE ORAL
Qty: 30 TABLET | Refills: 5 | Status: SHIPPED | OUTPATIENT
Start: 2024-09-12

## 2024-09-13 ENCOUNTER — CARE COORDINATION (OUTPATIENT)
Dept: CARE COORDINATION | Age: 69
End: 2024-09-13

## 2024-09-13 DIAGNOSIS — I10 PRIMARY HYPERTENSION: Primary | ICD-10-CM

## 2024-09-13 SDOH — HEALTH STABILITY: PHYSICAL HEALTH: ON AVERAGE, HOW MANY MINUTES DO YOU ENGAGE IN EXERCISE AT THIS LEVEL?: 60 MIN

## 2024-09-13 SDOH — SOCIAL STABILITY: SOCIAL NETWORK: ARE YOU MARRIED, WIDOWED, DIVORCED, SEPARATED, NEVER MARRIED, OR LIVING WITH A PARTNER?: WIDOWED

## 2024-09-13 SDOH — SOCIAL STABILITY: SOCIAL NETWORK
DO YOU BELONG TO ANY CLUBS OR ORGANIZATIONS SUCH AS CHURCH GROUPS UNIONS, FRATERNAL OR ATHLETIC GROUPS, OR SCHOOL GROUPS?: YES

## 2024-09-13 SDOH — ECONOMIC STABILITY: INCOME INSECURITY: IN THE LAST 12 MONTHS, WAS THERE A TIME WHEN YOU WERE NOT ABLE TO PAY THE MORTGAGE OR RENT ON TIME?: NO

## 2024-09-13 SDOH — ECONOMIC STABILITY: FOOD INSECURITY: WITHIN THE PAST 12 MONTHS, THE FOOD YOU BOUGHT JUST DIDN'T LAST AND YOU DIDN'T HAVE MONEY TO GET MORE.: NEVER TRUE

## 2024-09-13 SDOH — SOCIAL STABILITY: SOCIAL NETWORK: HOW OFTEN DO YOU ATTENT MEETINGS OF THE CLUB OR ORGANIZATION YOU BELONG TO?: MORE THAN 4 TIMES PER YEAR

## 2024-09-13 SDOH — ECONOMIC STABILITY: FOOD INSECURITY: WITHIN THE PAST 12 MONTHS, YOU WORRIED THAT YOUR FOOD WOULD RUN OUT BEFORE YOU GOT MONEY TO BUY MORE.: NEVER TRUE

## 2024-09-13 SDOH — HEALTH STABILITY: MENTAL HEALTH: HOW OFTEN DO YOU HAVE A DRINK CONTAINING ALCOHOL?: NEVER

## 2024-09-13 SDOH — SOCIAL STABILITY: SOCIAL NETWORK
IN A TYPICAL WEEK, HOW MANY TIMES DO YOU TALK ON THE PHONE WITH FAMILY, FRIENDS, OR NEIGHBORS?: MORE THAN THREE TIMES A WEEK

## 2024-09-13 SDOH — HEALTH STABILITY: MENTAL HEALTH: HOW MANY STANDARD DRINKS CONTAINING ALCOHOL DO YOU HAVE ON A TYPICAL DAY?: PATIENT DOES NOT DRINK

## 2024-09-13 SDOH — HEALTH STABILITY: MENTAL HEALTH
STRESS IS WHEN SOMEONE FEELS TENSE, NERVOUS, ANXIOUS, OR CAN'T SLEEP AT NIGHT BECAUSE THEIR MIND IS TROUBLED. HOW STRESSED ARE YOU?: RATHER MUCH

## 2024-09-13 SDOH — ECONOMIC STABILITY: INCOME INSECURITY: HOW HARD IS IT FOR YOU TO PAY FOR THE VERY BASICS LIKE FOOD, HOUSING, MEDICAL CARE, AND HEATING?: NOT HARD AT ALL

## 2024-09-13 SDOH — SOCIAL STABILITY: SOCIAL NETWORK: HOW OFTEN DO YOU ATTEND CHURCH OR RELIGIOUS SERVICES?: NEVER

## 2024-09-13 SDOH — SOCIAL STABILITY: SOCIAL NETWORK: HOW OFTEN DO YOU GET TOGETHER WITH FRIENDS OR RELATIVES?: ONCE A WEEK

## 2024-09-13 SDOH — HEALTH STABILITY: PHYSICAL HEALTH: ON AVERAGE, HOW MANY DAYS PER WEEK DO YOU ENGAGE IN MODERATE TO STRENUOUS EXERCISE (LIKE A BRISK WALK)?: 7 DAYS

## 2024-09-16 ENCOUNTER — CARE COORDINATION (OUTPATIENT)
Facility: CLINIC | Age: 69
End: 2024-09-16

## 2024-09-17 ENCOUNTER — CARE COORDINATION (OUTPATIENT)
Dept: CARE COORDINATION | Age: 69
End: 2024-09-17

## 2024-09-20 LAB
B PARAPERT DNA SPEC QL NAA+PROBE: NOT DETECTED
B PERT DNA SPEC QL NAA+PROBE: NOT DETECTED
SPECIMEN SOURCE: NORMAL

## 2024-09-23 ENCOUNTER — TELEPHONE (OUTPATIENT)
Dept: CASE MANAGEMENT | Age: 69
End: 2024-09-23

## 2024-09-23 NOTE — TELEPHONE ENCOUNTER
Dr Stewart,     Incidental pulmonary finding with recommended f/u, CT Chest 9/7/24 [facility] ED:    1. Right middle lobe pneumonia. There is opacification at the origin of the right middle lobe bronchus, but there is no gross mass lesion. A small endobronchial lesion cannot be excluded. Recommend short-term follow-up CT chest after appropriate course   of antibiotic treatment. If there is been no improvement on antibiotics, then consider pulmonology consultation for consideration of bronchoscopy.  2. Left lower lobe bronchopneumonia  3. Nonspecific mild interlobular septal thickening at the lung bases without pleural effusion.  4. Right lower lobe 5 mm pulmonary nodule. Lung Nodule Actionable Findings on Non Lung Screenings: SINGLE Pulmonary Nodule <6mm, Solid, Solitary - Following the Fleischner Society 2017 guidelines for single solid nodules < 6mm, low risk patients: no   follow-up suggested. If suspicious morphology or upper lobe location, consider 12 month follow-up. High risk patients: optional CT in 12 months.   Qzxv      Thanks,  Kimberly NAMN, RN   Lung Nodule Navigator  [facility]  [phone]

## 2024-09-26 VITALS — SYSTOLIC BLOOD PRESSURE: 146 MMHG | DIASTOLIC BLOOD PRESSURE: 93 MMHG | HEART RATE: 73 BPM

## 2024-09-27 ENCOUNTER — CARE COORDINATION (OUTPATIENT)
Dept: CARE COORDINATION | Age: 69
End: 2024-09-27

## 2024-10-01 ENCOUNTER — CARE COORDINATION (OUTPATIENT)
Dept: CASE MANAGEMENT | Age: 69
End: 2024-10-01

## 2024-10-04 ENCOUNTER — CARE COORDINATION (OUTPATIENT)
Dept: CARE COORDINATION | Age: 69
End: 2024-10-04

## 2024-10-04 ENCOUNTER — CARE COORDINATION (OUTPATIENT)
Facility: CLINIC | Age: 69
End: 2024-10-04

## 2024-10-04 DIAGNOSIS — I10 PRIMARY HYPERTENSION: Primary | ICD-10-CM

## 2024-10-04 NOTE — PROGRESS NOTES
Remote Patient Order Discontinued    Received request from Summerlin, Christina, RN   to discontinue order for remote patient monitoring of HTN and order completed.

## 2024-10-04 NOTE — CARE COORDINATION
Ambulatory Care Coordination Note     10/4/2024 9:36 AM     Patient outreach attempt by this ACM today to perform care management follow up . ACM was unable to reach the patient by telephone today; left voice message requesting a return phone call to this ACM.  Buxferhart message sent requesting patient to contact this ACM.     ACM: Christina Summerlin     Care Summary Note: Second attempt.    RPM Vital Signs:    Remote Patient Monitoring Disenrollment      Date/Time:  10/4/2024 9:36 AM  Patient Current Location: [place]  Patient has been dis-enrolled from Remote Patient Monitoring program on 10/4/2024 due to disengagement. Patient has been provided instruction on process to return RPM equipment and RPM has been deactivated.     Patient has ACM's contact information for any further questions, concerns, or needs.     PCP/Specialist follow up:       Follow Up:   Plan for next ACM outreach in approximately 1 week to complete:  - CC Protocol assessments  - disease specific assessments  - goal progression  - education .

## 2024-10-04 NOTE — CARE COORDINATION
Patient Myranda Quiroga  10/04/24     Care Coordination  placed call to patient to arrange RPM kit  through [employer]. Left HIPAA Compliant Message     provided return and how to pack equipment in original packing via the patients voicemail if available and provided call back number should patient have questions.    Patient made aware [employer] will  equipment in 2-4 days.

## 2024-10-07 ENCOUNTER — CARE COORDINATION (OUTPATIENT)
Dept: CARE COORDINATION | Age: 69
End: 2024-10-07

## 2024-10-07 NOTE — CARE COORDINATION
SW placed second outreach call to patient to discuss AD's and other resource needs. Left voicemail and requested return call. Will continue to follow.    Arlyn Momin MSW, LSW     [phone]

## 2024-10-10 ENCOUNTER — CARE COORDINATION (OUTPATIENT)
Dept: CARE COORDINATION | Age: 69
End: 2024-10-10

## 2024-10-10 DIAGNOSIS — I10 ESSENTIAL HYPERTENSION: ICD-10-CM

## 2024-10-10 RX ORDER — TRIAMTERENE/HYDROCHLOROTHIAZID 37.5-25 MG
1 TABLET ORAL DAILY
Qty: 30 TABLET | Refills: 0 | Status: SHIPPED | OUTPATIENT
Start: 2024-10-10

## 2024-10-10 NOTE — CARE COORDINATION
Ambulatory Care Coordination Note     10/10/2024 8:31 AM     Patient Current Location:  [place]     ACM contacted the patient by telephone. Verified name and  with patient as identifiers.         ACM: Christina Summerlin     Challenges to be reviewed by the provider   Additional needs identified to be addressed with provider Yes  medications-Refill request from patient for Maxzide and cyanocobalamin injection. Thank you.                Method of communication with provider: staff message.    Has the patient been seen in the ED since your last call? no    Care Summary Note: Follow up outreach completed today with the patient. Patient has no c/o SOB, fatigue, palpitations, or fevers. Patient has a intermittent cough that she reports has been present since February. She denies that worsening symptoms. This ACM's assessment reveals no signs of exacerbation of any chronic conditions. Patient is taking all medications as prescribed without any concerns. Patient is requesting a refill for two of her medications, This ACM will inform her PCP.  The patient stated that she is going to court next week to evict a tenant. She states that she has stress resulting from have to deal with the whole process. Patient denies any other concerns or needs at this time. Patient can verbalize understanding of when to seek help for emergent needs.      Offered patient enrollment in the Remote Patient Monitoring (RPM) program for in-home monitoring: Yes, but did not enroll at this time: Not interested .     Assessments Completed:   COPD Assessment    Does the patient understand envrionmental exposure?: Yes  Is the patient able to verbalize Rescue vs. Long Acting medications?: No  Does the patient have a nebulizer?: No  Does the patient use a space with inhaled medications?: No     No patient-reported symptoms         Symptoms:  None: Yes      Breathlessness: none  Increase use of rapid acting/rescue inhaled medications?: Not

## 2024-10-15 ENCOUNTER — CARE COORDINATION (OUTPATIENT)
Dept: CARE COORDINATION | Age: 69
End: 2024-10-15

## 2024-10-15 NOTE — CARE COORDINATION
SW placed third outreach call to patient to discuss AD's and other resource needs. Left voicemail and requested return call. Will remain available should patient return call, otherwise will sign off at this time due to failed outreach attempts.      Arlyn Momin MSW, LSW     [phone]

## 2024-10-24 ENCOUNTER — CARE COORDINATION (OUTPATIENT)
Dept: CARE COORDINATION | Age: 69
End: 2024-10-24

## 2024-10-24 NOTE — CARE COORDINATION
Ambulatory Care Coordination Note     10/24/2024 10:07 AM     Patient outreach attempt by this ACM today to perform care management follow up . ACM was unable to reach the patient by telephone today; left voice message requesting a return phone call to this ACM.     ACM: Christina Summerlin     Care Summary Note: First failed attempt.    PCP/Specialist follow up:       Follow Up:   Plan for next ACM outreach in approximately 1 week to complete:  - CC Protocol assessments  - disease specific assessments  - goal progression  - education .

## 2024-10-31 ENCOUNTER — CARE COORDINATION (OUTPATIENT)
Dept: CARE COORDINATION | Age: 69
End: 2024-10-31

## 2024-10-31 NOTE — CARE COORDINATION
Ambulatory Care Coordination Note     10/31/2024 12:52 PM     ACM outreach attempt by this ACM today to perform care management follow up . ACM was unable to reach the patient by telephone today; left voice message requesting a return phone call to this ACM.     ACM: Christina Summerlin     Care Summary Note: Second attempt      PCP/Specialist follow up:       Follow Up:   Plan for next ACM outreach in approximately 2 weeks to complete:  - CC Protocol assessments  - disease specific assessments  - goal progression  - education   - RPM.              Medication:  amphetamine-dextroamphetamine (ADDERALL) 30 MG tablet    Medication:  amphetamine-dextroamphetamine (ADDERALL) 20 MG tablet    PCP: Dr. Urrutia    Preferred Contact Number:      Ida [phone]     Who will be picking up: Patient    Advised patient that the nurse will call when the prescription is ready for  within 48-72 hours.

## 2024-11-14 ENCOUNTER — CARE COORDINATION (OUTPATIENT)
Dept: CARE COORDINATION | Age: 69
End: 2024-11-14

## 2024-11-14 NOTE — CARE COORDINATION
Ambulatory Care Coordination Note     2024 9:50 AM     Patient Current Location:  Home: [facility] [address]  [place] [address]     ACM contacted the patient by telephone. Verified name and  with patient as identifiers.         ACM: Christina Summerlin     Challenges to be reviewed by the provider   Additional needs identified to be addressed with provider No  none               Method of communication with provider: none.    Utilization: Patient has not had any utilization since our last call.     Care Summary Note: Follow up outreach completed today with the patient. Patient has no c/o SOB, HU, fatigue, chest pains, palpitations, or fevers. ACM's assessment reveals no signs of exacerbation of any chronic conditions. Patient is taking all medications as prescribed without any concerns. She does report that she has been more stressed r/t caring for her disable daughter, grandson, and adopted 3 y.o. daughter. She stated that she has been to the ED with her grandson numerous times this month for palpitations and panic attacks. This ACM will contact Dr. Ballard for mental health support. Patient denies any other concerns or needs at this time. Patient can verbalize understanding of when to seek help for emergent needs.     Offered patient enrollment in the Remote Patient Monitoring (RPM) program for in-home monitoring: Yes, but did not enroll at this time: already monitoring with home equipment.     Assessments Completed:   COPD Assessment    Does the patient understand envrionmental exposure?: Yes  Is the patient able to verbalize Rescue vs. Long Acting medications?: No  Does the patient have a nebulizer?: No  Does the patient use a space with inhaled medications?: No     No patient-reported symptoms         Symptoms:  None: Yes      Breathlessness: none  Increase use of rapid acting/rescue inhaled medications?: Not Applicable  Change in chronic cough?: No/At Baseline  Change in sputum?: No/At Baseline      ,

## 2024-12-02 ENCOUNTER — OFFICE VISIT (OUTPATIENT)
Age: 69
End: 2024-12-02

## 2024-12-02 DIAGNOSIS — F43.0 STRESS REACTION: Primary | ICD-10-CM

## 2024-12-02 PROCEDURE — 90791 PSYCH DIAGNOSTIC EVALUATION: CPT | Performed by: PSYCHOLOGIST

## 2024-12-02 PROCEDURE — 1123F ACP DISCUSS/DSCN MKR DOCD: CPT | Performed by: PSYCHOLOGIST

## 2024-12-02 ASSESSMENT — PROMIS GLOBAL HEALTH SCALE
IN THE PAST 7 DAYS, HOW WOULD YOU RATE YOUR FATIGUE ON AVERAGE [ON A SCALE FROM 1 (NONE) TO 5 (VERY SEVERE)]?: SEVERE
SUM OF RESPONSES TO QUESTIONS 2, 4, 5, & 10: 9
IN GENERAL, PLEASE RATE HOW WELL YOU CARRY OUT YOUR USUAL SOCIAL ACTIVITIES (INCLUDES ACTIVITIES AT HOME, AT WORK, AND IN YOUR COMMUNITY, AND RESPONSIBILITIES AS A PARENT, CHILD, SPOUSE, EMPLOYEE, FRIEND, ETC) [ON A SCALE OF 1 (POOR) TO 5 (EXCELLENT)]?: EXCELLENT
IN THE PAST 7 DAYS, HOW WOULD YOU RATE YOUR PAIN ON AVERAGE [ON A SCALE FROM 0 (NO PAIN) TO 10 (WORST IMAGINABLE PAIN)]?: 4
IN GENERAL, WOULD YOU SAY YOUR HEALTH IS...[ON A SCALE OF 1 (POOR) TO 5 (EXCELLENT)]: GOOD
IN GENERAL, HOW WOULD YOU RATE YOUR PHYSICAL HEALTH [ON A SCALE OF 1 (POOR) TO 5 (EXCELLENT)]?: VERY GOOD
TO WHAT EXTENT ARE YOU ABLE TO CARRY OUT YOUR EVERYDAY PHYSICAL ACTIVITIES SUCH AS WALKING, CLIMBING STAIRS, CARRYING GROCERIES, OR MOVING A CHAIR [ON A SCALE OF 1 (NOT AT ALL) TO 5 (COMPLETELY)]?: COMPLETELY
SUM OF RESPONSES TO QUESTIONS 3, 6, 7, & 8: 15
IN GENERAL, HOW WOULD YOU RATE YOUR SATISFACTION WITH YOUR SOCIAL ACTIVITIES AND RELATIONSHIPS [ON A SCALE OF 1 (POOR) TO 5 (EXCELLENT)]?: POOR
IN GENERAL, WOULD YOU SAY YOUR QUALITY OF LIFE IS...[ON A SCALE OF 1 (POOR) TO 5 (EXCELLENT)]: FAIR
IN THE PAST 7 DAYS, HOW OFTEN HAVE YOU BEEN BOTHERED BY EMOTIONAL PROBLEMS, SUCH AS FEELING ANXIOUS, DEPRESSED, OR IRRITABLE [ON A SCALE FROM 1 (NEVER) TO 5 (ALWAYS)]?: RARELY
IN GENERAL, HOW WOULD YOU RATE YOUR MENTAL HEALTH, INCLUDING YOUR MOOD AND YOUR ABILITY TO THINK [ON A SCALE OF 1 (POOR) TO 5 (EXCELLENT)]?: FAIR

## 2024-12-02 NOTE — PROGRESS NOTES
Behavioral Health Consultation   Crystal Ballard, PhD  Clinical Psychologist  2024      Time spent with Patient: 52 minutes  10:33-11:25  This is patient's 1st  [facility] appointment.    Reason for Consult:   Chief Complaint   Patient presents with    Stress    Anxiety       Referring Provider: Rakesh Stewart MD    Pt provided informed consent for the behavioral health program. Discussed with patient model of service to include the limits of confidentiality (i.e. abuse reporting, suicide intervention, etc.) and short-term intervention focused approach. Pt indicated understanding.    Subjective  LIFE CONTEXT   1. Family  With whom do you live? Disabled daughter Vira who goes to adult workshop,  grandson Candelario (24), has legal custody of great grand daughter Anitha (3)   or partner?    Length of relationship? Abel  16  Children?  Yes 3 kids - Vira Haji, 7 grands, 2 greats  Type of relationships with the people you live with? Good with daughter, great with Anitha, good and dysfunctional with Candelario  Supportive relationships? Self    2. Social  Friends? No Quality? n/a Frequency of contact? no  Other connection with community? no    3. Work/School  Work/go to school? 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 7 How are you doing? Good  How do you support yourself financially? work    4. Recreation  For fun? Relaxation?  Nothing really   How often? rare    5. Self-Care  Exercise on a regular basis for health? No  What type of exercise and how often?  none     Sleep ok?  Sleep is very broken - due to caring for family members, eczema, worrying    Eat ok? Fair  Use tobacco (what and how often)?  smokes 1/2 per Daily  Use caffeine (what and how often)?   4 coke cans of soda per day  Use alcohol (what and how often)? none  Use drugs (what and how often)? No current use  Problems in the past with tobacco, alcohol or drugs?  No  Medications: Take as

## 2024-12-05 ENCOUNTER — CARE COORDINATION (OUTPATIENT)
Dept: CARE COORDINATION | Age: 69
End: 2024-12-05

## 2024-12-05 NOTE — CARE COORDINATION
Ambulatory Care Coordination Note     2024 12:35 PM     Patient Current Location:  Home: [facility] [address]  [place] [id number]     ACM contacted the patient by telephone. Verified name and  with patient as identifiers.         ACM: Christina Summerlin     Challenges to be reviewed by the provider   Additional needs identified to be addressed with provider No  none               Method of communication with provider: none.    Utilization: Patient has not had any utilization since our last call.     Care Summary Note: Follow up outreach completed today with the patient. Patient has no c/o SOB, HU, fatigue, chest pains, palpitations, or fevers. ACM's assessment reveals no signs of exacerbation of any chronic conditions. Patient is taking all medications as prescribed without any concerns. She stated that she is seeing Dr. Ballard for stress reduction and that she was given the contact information for [employer]. [facility] is currently backed up with appointments. This ACM will send her other options for counseling via [employer]. The patient is currently cleaning out her home that she was renting to individuals that she recently had evicted. And she is also still caring for her family and working, so she is under a lot of stress. The patient denies any other concerns or needs at this time and can verbalize understanding of when to seek help for emergent needs.      Offered patient enrollment in the Remote Patient Monitoring (RPM) program for in-home monitoring: Yes, but did not enroll at this time: already monitoring with home equipment.     Assessments Completed:   COPD Assessment    Does the patient understand envrionmental exposure?: Yes  Is the patient able to verbalize Rescue vs. Long Acting medications?: No  Does the patient have a nebulizer?: No  Does the patient use a space with inhaled medications?: No     No patient-reported symptoms         Symptoms:  None: Yes      Breathlessness: none  Increase use of

## 2024-12-16 ENCOUNTER — OFFICE VISIT (OUTPATIENT)
Age: 69
End: 2024-12-16
Payer: MEDICARE

## 2024-12-16 DIAGNOSIS — F43.0 STRESS REACTION: Primary | ICD-10-CM

## 2024-12-16 PROCEDURE — 1123F ACP DISCUSS/DSCN MKR DOCD: CPT | Performed by: PSYCHOLOGIST

## 2024-12-16 PROCEDURE — 90832 PSYTX W PT 30 MINUTES: CPT | Performed by: PSYCHOLOGIST

## 2024-12-16 ASSESSMENT — PROMIS GLOBAL HEALTH SCALE
SUM OF RESPONSES TO QUESTIONS 2, 4, 5, & 10: 10
IN GENERAL, PLEASE RATE HOW WELL YOU CARRY OUT YOUR USUAL SOCIAL ACTIVITIES (INCLUDES ACTIVITIES AT HOME, AT WORK, AND IN YOUR COMMUNITY, AND RESPONSIBILITIES AS A PARENT, CHILD, SPOUSE, EMPLOYEE, FRIEND, ETC) [ON A SCALE OF 1 (POOR) TO 5 (EXCELLENT)]?: EXCELLENT
IN GENERAL, HOW WOULD YOU RATE YOUR PHYSICAL HEALTH [ON A SCALE OF 1 (POOR) TO 5 (EXCELLENT)]?: VERY GOOD
IN GENERAL, HOW WOULD YOU RATE YOUR SATISFACTION WITH YOUR SOCIAL ACTIVITIES AND RELATIONSHIPS [ON A SCALE OF 1 (POOR) TO 5 (EXCELLENT)]?: FAIR
IN GENERAL, WOULD YOU SAY YOUR HEALTH IS...[ON A SCALE OF 1 (POOR) TO 5 (EXCELLENT)]: GOOD
IN GENERAL, WOULD YOU SAY YOUR QUALITY OF LIFE IS...[ON A SCALE OF 1 (POOR) TO 5 (EXCELLENT)]: VERY GOOD
IN THE PAST 7 DAYS, HOW OFTEN HAVE YOU BEEN BOTHERED BY EMOTIONAL PROBLEMS, SUCH AS FEELING ANXIOUS, DEPRESSED, OR IRRITABLE [ON A SCALE FROM 1 (NEVER) TO 5 (ALWAYS)]?: OFTEN
TO WHAT EXTENT ARE YOU ABLE TO CARRY OUT YOUR EVERYDAY PHYSICAL ACTIVITIES SUCH AS WALKING, CLIMBING STAIRS, CARRYING GROCERIES, OR MOVING A CHAIR [ON A SCALE OF 1 (NOT AT ALL) TO 5 (COMPLETELY)]?: COMPLETELY
IN THE PAST 7 DAYS, HOW WOULD YOU RATE YOUR FATIGUE ON AVERAGE [ON A SCALE FROM 1 (NONE) TO 5 (VERY SEVERE)]?: MODERATE
SUM OF RESPONSES TO QUESTIONS 3, 6, 7, & 8: 17
IN GENERAL, HOW WOULD YOU RATE YOUR MENTAL HEALTH, INCLUDING YOUR MOOD AND YOUR ABILITY TO THINK [ON A SCALE OF 1 (POOR) TO 5 (EXCELLENT)]?: FAIR
IN THE PAST 7 DAYS, HOW WOULD YOU RATE YOUR PAIN ON AVERAGE [ON A SCALE FROM 0 (NO PAIN) TO 10 (WORST IMAGINABLE PAIN)]?: 5

## 2024-12-16 NOTE — PROGRESS NOTES
Behavioral Health Consultation   Crystal Ballard, PhD  Clinical Psychologist  12/16/2024      Time spent with Patient: 30 minutes 10:30 -11:00  This is patient's 2nd  [facility] appointment.    Reason for Consult:   Chief Complaint   Patient presents with    Anxiety    Stress       Referring Provider: Rakesh Stewart MD    Subjective  PT Improvement Questions  How is (target problem) going for you? Same, better, or worse? better    What specifically has changed (if anything)?  Realizing she has been very affected by negativity - is working to eliminate them.  Recently ended relationship with Luis M and stress is reduced as a result.       Has anyone else noticed any change(s)?  If so, what? N/a    What do you think is causing the change or keeping it from getting worse?  [facility] doesn't have in person appointments  Is looking at singles groups, support groups as well    Plan Implementation Questions    1.  Return to meet with [facility]      What part were you able to do? All of it  Did it help/what were the results?  n/a    FUNCTIONAL ANALYSIS (if applicable)     Risk Assessment: Patient is judged to be at  low risk for harm to self/others due to  no current thoughts/reports of suicide and homicide.     Objective  Level of distress: Low  Orientation: Person, Place, Time, and Situation  Appearance: Well-groomed, Awake, Alert, and Good eye contact  Other: mood is euthymic      12/16/24 PROMIS-10 Scores: Physical: 17 Mental: 10    PROMIS Raw Score   WNL Symptoms /Impairment     Mild Moderate Severe   Global Physical Health 15-20 13-14 9-12 4-8   Global Mental Health 14-20 11-13 7-10 4-6     Assessment and Plan  Stress reaction. Pt reports improvement in functioning, she is working to reduce negativity in her life, recently  ended a relationship. She is beginning to think about how to act in her own best interest.  Visit spent discussing ongoing challenges, as she remains the primary caregiver both for her adult daughter as well as

## 2024-12-19 ENCOUNTER — CARE COORDINATION (OUTPATIENT)
Dept: CARE COORDINATION | Age: 69
End: 2024-12-19

## 2024-12-19 NOTE — CARE COORDINATION
Ambulatory Care Coordination Note     12/19/2024 9:42 AM     Patient outreach attempt by this ACM today to perform care management follow up . ACM was unable to reach the patient by telephone today;   left voice message requesting a return phone call to this ACM.     ACM: Christina Summerlin     Care Summary Note: First attempt    PCP/Specialist follow up:   Future Appointments         Provider Specialty Dept Phone    1/13/2025 10:00 AM Crystal Ballard, PhD Psychology [phone]            Follow Up:   Plan for next AC outreach in approximately 2 weeks to complete:  - CC Protocol assessments  - disease specific assessments  - goal progression  - education   - possible graduation .

## 2024-12-19 NOTE — CARE COORDINATION
Ambulatory Care Coordination Note     2024 10:06 AM     Patient Current Location:  [place]     Patient contacted the ACM by telephone. Verified name and  with patient as identifiers.         ACM: Christina Summerlin     Challenges to be reviewed by the provider   Additional needs identified to be addressed with provider No  none               Method of communication with provider: none.    Utilization: Patient has not had any utilization since our last call.     Care Summary Note: Follow up outreach completed today with the patient. Patient has no c/o SOB, HU, fatigue, cough, headaches, or swelling. ACM's assessment reveals no signs of exacerbation of any chronic conditions. Patient is taking all medications as prescribed without any concerns. The patient is still completing sessions with Dr. Ballard for stress/mental health. This AC will send other options via [employer] for mental health. The patient has a son-in-law and 6-month-old grandchild in the hospital and her grandson continues to have unresolved cardiac palpitations. She is experiencing a lot of stress.   Patient denies any other concerns or needs at this time. Patient can verbalize understanding of when to seek help for emergent needs.      Offered patient enrollment in the Remote Patient Monitoring (RPM) program for in-home monitoring: Yes, but did not enroll at this time: not interested .     Assessments Completed:   COPD Assessment    Does the patient understand envrionmental exposure?: Yes  Is the patient able to verbalize Rescue vs. Long Acting medications?: No  Does the patient have a nebulizer?: No  Does the patient use a space with inhaled medications?: No     No patient-reported symptoms         Symptoms:  None: Yes      Breathlessness: none  Increase use of rapid acting/rescue inhaled medications?: Not Applicable  Change in chronic cough?: No/At Baseline  Change in sputum?: No/At Baseline      ,   Hypertension - Encounter Level

## 2025-01-02 ENCOUNTER — CARE COORDINATION (OUTPATIENT)
Dept: CARE COORDINATION | Age: 70
End: 2025-01-02

## 2025-01-02 NOTE — CARE COORDINATION
Ambulatory Care Coordination Note     1/2/2025 11:15 AM     Patient outreach attempt by this ACM today to perform care management follow up . ACM was unable to reach the patient by telephone today;   left voice message requesting a return phone call to this ACM.     ACM: Christina Summerlin     Care Summary Note: ACM attempted outreach call to patient and was unsuccessful. ACM to attempt outreach contact at a later time.     PCP/Specialist follow up:   Future Appointments         Provider Specialty Dept Phone    1/13/2025 10:00 AM Crystal Ballard, PhD Psychology [phone]            Follow Up:   Plan for next ACM outreach in approximately 2 weeks to complete:  - CC Protocol assessments  - disease specific assessments  - goal progression  - education   - possible graduation (assess need for further counseling options).

## 2025-01-08 ENCOUNTER — CARE COORDINATION (OUTPATIENT)
Dept: CARE COORDINATION | Age: 70
End: 2025-01-08

## 2025-01-08 NOTE — CARE COORDINATION
Handoff provided by this ACM to new ACM, Priscila Nelson RN. All questions discussed and no additional information is needed.    Christina Summerlin, BSN, RN  Email: [email or web address]

## 2025-01-13 ENCOUNTER — OFFICE VISIT (OUTPATIENT)
Age: 70
End: 2025-01-13
Payer: MEDICARE

## 2025-01-13 DIAGNOSIS — F43.0 STRESS REACTION: Primary | ICD-10-CM

## 2025-01-13 PROCEDURE — 1123F ACP DISCUSS/DSCN MKR DOCD: CPT | Performed by: PSYCHOLOGIST

## 2025-01-13 PROCEDURE — 90832 PSYTX W PT 30 MINUTES: CPT | Performed by: PSYCHOLOGIST

## 2025-01-13 NOTE — PROGRESS NOTES
Behavioral Health Consultation   Crystal Ballard, PhD  Clinical Psychologist  1/13/2025      Time spent with Patient: 30 minutes 10:02-10:32   This is patient's 3rd  [facility] appointment.    Reason for Consult:   Chief Complaint   Patient presents with    Anxiety    Stress       Referring Provider: Rakesh Stewart MD    Subjective  PT Improvement Questions  How is (target problem) going for you? Same, better, or worse? worse    What specifically has changed (if anything)?  Financial stressors -Anitha's school is 1k per month  , Loni went well. Considering flying to [place] for her [place] to sit on the beach, eat barbecue, got to [place]. Worrying about traveling by herself - or going to [place], [place]. Anitha's father is threatening to seek full custody.    Has anyone else noticed any change(s)?  If so, what? N/a    What do you think is causing the change or keeping it from getting worse?  Took a weekend for respite - Candelario and Tammie helped, told him it would be 4-6 weeks moving forward  - soaked in tub, watched tv, slept    Plan Implementation Questions    Recommendations to Patient:   1.  Consider how to implement respite: Speak with adult workshop to see what community options are available  2.  Consider asking Candelario for the gift of time for Loni to have a 36-hour break     What part were you able to do? Some of it  Did it help/what were the results?  Yes    FUNCTIONAL ANALYSIS (if applicable)     Risk Assessment: Patient is judged to be at  low risk for harm to self/others due to  no current thoughts/reports of suicide and homicide.     Objective  Level of distress: Low  Orientation: Person, Place, Time, and Situation  Appearance: Well-groomed, Awake, Alert, and Good eye contact  Other: mood is euthymic      1/13/25 PROMIS-10 Scores:     PROMIS Raw Score   WNL Symptoms /Impairment     Mild Moderate Severe   Global Physical Health 15-20 13-14 9-12 4-8   Global Mental Health 14-20 11-13 7-10 4-6

## 2025-01-16 ENCOUNTER — CARE COORDINATION (OUTPATIENT)
Dept: CARE COORDINATION | Age: 70
End: 2025-01-16

## 2025-01-16 NOTE — CARE COORDINATION
Ambulatory Care Coordination Note     1/16/2025 12:27 PM     Patient outreach attempt by this ACM today to perform care management follow up . ACM was unable to reach the patient by telephone today;   left voice message requesting a return phone call to this ACM.     ACM: Priscila Nelson RN     Care Summary Note: ACM left message on voicemail.     PCP/Specialist follow up:   Future Appointments         Provider Specialty Dept Phone    1/27/2025 10:30 AM Crystal Ballard, PhD Psychology [phone]            Follow Up:   Plan for next ACM outreach in approximately 2 weeks to complete:  - disease specific assessments  - goal progression  - education .

## 2025-01-30 ENCOUNTER — CARE COORDINATION (OUTPATIENT)
Dept: CARE COORDINATION | Age: 70
End: 2025-01-30

## 2025-01-31 ENCOUNTER — CARE COORDINATION (OUTPATIENT)
Dept: CARE COORDINATION | Age: 70
End: 2025-01-31

## 2025-01-31 NOTE — CARE COORDINATION
Ambulatory Care Coordination Note     1/31/2025 7:51 AM        Patient closed (unable to reach patient) from the High Risk Care Management program on 1/31/2025.  No further Ambulatory Care Manager follow up scheduled.

## 2025-02-21 NOTE — TELEPHONE ENCOUNTER
2 tablets daily til sees dermatology  Pharmacist notified Goal Outcome Evaluation:      Lucinda Dickey 

## 2025-02-24 ENCOUNTER — OFFICE VISIT (OUTPATIENT)
Age: 70
End: 2025-02-24
Payer: MEDICARE

## 2025-02-24 DIAGNOSIS — F43.0 STRESS REACTION: Primary | ICD-10-CM

## 2025-02-24 PROCEDURE — 1123F ACP DISCUSS/DSCN MKR DOCD: CPT | Performed by: PSYCHOLOGIST

## 2025-02-24 PROCEDURE — 90832 PSYTX W PT 30 MINUTES: CPT | Performed by: PSYCHOLOGIST

## 2025-02-24 ASSESSMENT — PROMIS GLOBAL HEALTH SCALE
SUM OF RESPONSES TO QUESTIONS 3, 6, 7, & 8: 14
TO WHAT EXTENT ARE YOU ABLE TO CARRY OUT YOUR EVERYDAY PHYSICAL ACTIVITIES SUCH AS WALKING, CLIMBING STAIRS, CARRYING GROCERIES, OR MOVING A CHAIR [ON A SCALE OF 1 (NOT AT ALL) TO 5 (COMPLETELY)]?: MOSTLY
SUM OF RESPONSES TO QUESTIONS 2, 4, 5, & 10: 10
IN GENERAL, HOW WOULD YOU RATE YOUR SATISFACTION WITH YOUR SOCIAL ACTIVITIES AND RELATIONSHIPS [ON A SCALE OF 1 (POOR) TO 5 (EXCELLENT)]?: GOOD
IN GENERAL, WOULD YOU SAY YOUR QUALITY OF LIFE IS...[ON A SCALE OF 1 (POOR) TO 5 (EXCELLENT)]: GOOD
IN GENERAL, WOULD YOU SAY YOUR HEALTH IS...[ON A SCALE OF 1 (POOR) TO 5 (EXCELLENT)]: GOOD
IN GENERAL, HOW WOULD YOU RATE YOUR PHYSICAL HEALTH [ON A SCALE OF 1 (POOR) TO 5 (EXCELLENT)]?: GOOD
IN THE PAST 7 DAYS, HOW WOULD YOU RATE YOUR PAIN ON AVERAGE [ON A SCALE FROM 0 (NO PAIN) TO 10 (WORST IMAGINABLE PAIN)]?: 4
IN GENERAL, PLEASE RATE HOW WELL YOU CARRY OUT YOUR USUAL SOCIAL ACTIVITIES (INCLUDES ACTIVITIES AT HOME, AT WORK, AND IN YOUR COMMUNITY, AND RESPONSIBILITIES AS A PARENT, CHILD, SPOUSE, EMPLOYEE, FRIEND, ETC) [ON A SCALE OF 1 (POOR) TO 5 (EXCELLENT)]?: VERY GOOD
IN THE PAST 7 DAYS, HOW OFTEN HAVE YOU BEEN BOTHERED BY EMOTIONAL PROBLEMS, SUCH AS FEELING ANXIOUS, DEPRESSED, OR IRRITABLE [ON A SCALE FROM 1 (NEVER) TO 5 (ALWAYS)]?: OFTEN
IN GENERAL, HOW WOULD YOU RATE YOUR MENTAL HEALTH, INCLUDING YOUR MOOD AND YOUR ABILITY TO THINK [ON A SCALE OF 1 (POOR) TO 5 (EXCELLENT)]?: FAIR
IN THE PAST 7 DAYS, HOW WOULD YOU RATE YOUR FATIGUE ON AVERAGE [ON A SCALE FROM 1 (NONE) TO 5 (VERY SEVERE)]?: MODERATE

## 2025-02-24 NOTE — PROGRESS NOTES
Behavioral Health Consultation   Crystal Ballard, PhD  Clinical Psychologist  2/24/2025      Time spent with Patient: 34 minutes 10:32-11:06   This is patient's 4th  [facility] appointment.    Reason for Consult:   Chief Complaint   Patient presents with    Stress       Referring Provider: Rakesh Stewart MD    Subjective  PT Improvement Questions  How is (target problem) going for you? Same, better, or worse? worse    What specifically has changed (if anything)?  Agreed to care for Ricky's  son - Anitha's little brother Santa (8 months)   -she moved in with her last month due to back issues, will be there until the end of the week - possibly moving in with her parents. 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  If so, what? N/a    What do you think is causing the change or keeping it from getting worse?  Took 3 days for herself around her birthday -  is working to take things one day at a time. Cutting work from 6 days to 3. Will be home by herself M and F 8-4. Candelario will be doing transport for Vira and the kids. Realizing she has to act in her best interest and take care of herself. Encouraging Candelario to get an apartment    Plan Implementation Questions    Recommendations to Patient:   1. Look into details for [place] - airfare, hotel, etc       What part were you able to do? None of it  Did it help/what were the results?  n/a    FUNCTIONAL ANALYSIS (if applicable)     Risk Assessment: Patient is judged to be at  low risk for harm to self/others due to  no current thoughts/reports of suicide and homicide.     Objective  Level of distress: Low  Orientation: Person, Place, Time, and Situation  Appearance: Well-groomed, Awake, Alert, and Good eye contact  Other: mood is euthymic      2/24/25 PROMIS-10 Scores: Physical: 14 Mental: 10    PROMIS Raw Score   WNL Symptoms /Impairment     Mild Moderate Severe   Global

## 2025-03-10 ENCOUNTER — OFFICE VISIT (OUTPATIENT)
Age: 70
End: 2025-03-10
Payer: MEDICARE

## 2025-03-10 DIAGNOSIS — F43.0 STRESS REACTION: Primary | ICD-10-CM

## 2025-03-10 PROCEDURE — 1123F ACP DISCUSS/DSCN MKR DOCD: CPT | Performed by: PSYCHOLOGIST

## 2025-03-10 PROCEDURE — 90834 PSYTX W PT 45 MINUTES: CPT | Performed by: PSYCHOLOGIST

## 2025-03-10 ASSESSMENT — PROMIS GLOBAL HEALTH SCALE
TO WHAT EXTENT ARE YOU ABLE TO CARRY OUT YOUR EVERYDAY PHYSICAL ACTIVITIES SUCH AS WALKING, CLIMBING STAIRS, CARRYING GROCERIES, OR MOVING A CHAIR [ON A SCALE OF 1 (NOT AT ALL) TO 5 (COMPLETELY)]?: MOSTLY
IN THE PAST 7 DAYS, HOW WOULD YOU RATE YOUR PAIN ON AVERAGE [ON A SCALE FROM 0 (NO PAIN) TO 10 (WORST IMAGINABLE PAIN)]?: 4
IN THE PAST 7 DAYS, HOW WOULD YOU RATE YOUR FATIGUE ON AVERAGE [ON A SCALE FROM 1 (NONE) TO 5 (VERY SEVERE)]?: MILD
SUM OF RESPONSES TO QUESTIONS 2, 4, 5, & 10: 8
SUM OF RESPONSES TO QUESTIONS 3, 6, 7, & 8: 15
IN GENERAL, WOULD YOU SAY YOUR QUALITY OF LIFE IS...[ON A SCALE OF 1 (POOR) TO 5 (EXCELLENT)]: GOOD
IN GENERAL, WOULD YOU SAY YOUR HEALTH IS...[ON A SCALE OF 1 (POOR) TO 5 (EXCELLENT)]: GOOD
IN THE PAST 7 DAYS, HOW OFTEN HAVE YOU BEEN BOTHERED BY EMOTIONAL PROBLEMS, SUCH AS FEELING ANXIOUS, DEPRESSED, OR IRRITABLE [ON A SCALE FROM 1 (NEVER) TO 5 (ALWAYS)]?: OFTEN
IN GENERAL, HOW WOULD YOU RATE YOUR MENTAL HEALTH, INCLUDING YOUR MOOD AND YOUR ABILITY TO THINK [ON A SCALE OF 1 (POOR) TO 5 (EXCELLENT)]?: POOR
IN GENERAL, HOW WOULD YOU RATE YOUR PHYSICAL HEALTH [ON A SCALE OF 1 (POOR) TO 5 (EXCELLENT)]?: GOOD
IN GENERAL, HOW WOULD YOU RATE YOUR SATISFACTION WITH YOUR SOCIAL ACTIVITIES AND RELATIONSHIPS [ON A SCALE OF 1 (POOR) TO 5 (EXCELLENT)]?: FAIR
IN GENERAL, PLEASE RATE HOW WELL YOU CARRY OUT YOUR USUAL SOCIAL ACTIVITIES (INCLUDES ACTIVITIES AT HOME, AT WORK, AND IN YOUR COMMUNITY, AND RESPONSIBILITIES AS A PARENT, CHILD, SPOUSE, EMPLOYEE, FRIEND, ETC) [ON A SCALE OF 1 (POOR) TO 5 (EXCELLENT)]?: GOOD

## 2025-03-10 NOTE — PROGRESS NOTES
Behavioral Health Consultation   Crystal Ballard, PhD  Clinical Psychologist  3/10/2025      Time spent with Patient: 50 minutes 1:03 -1:53  This is patient's 5th  [facility] appointment.    Reason for Consult:   Chief Complaint   Patient presents with    Stress       Referring Provider: Rakesh Stewart MD    Subjective  PT Improvement Questions  How is (target problem) going for you? Same, better, or worse? worse    What specifically has changed (if anything)?  Mood is down. Daughter Sole and her family  - Gagan and Austin lost housing, were sleeping in their truck, they have been back with her for last 3 weeks. Anitha is having difficulty sleeping at night - has been asking her if she is her mommy, she  is missing her dad Sonya. Hasn't seen him in over 6 weeks, contact is intermittent for the past few years  Ricky is also still in her home, ignores Anitha or is mean to her. Anitha is beginning to act out her anger at the situation.     Has anyone else noticed any change(s)?  If so, what? N/a    What do you think is causing the change or keeping it from getting worse?  Candelario is helping with transport of kids to , Vira to adult programming    Plan Implementation Questions    Recommendations to Patient:   1. Continue to set limits with Candelario and Ricky  2. Continue to act in your own best interest     What part were you able to do? Some of it  Did it help/what were the results?  Yes    FUNCTIONAL ANALYSIS (if applicable)     Risk Assessment: Patient is judged to be at  low risk for harm to self/others due to  no current thoughts/reports of suicide and homicide.     Objective  Level of distress: High  Orientation: Person, Place, Time, and Situation  Appearance: Well-groomed, Awake, Alert, and Good eye contact  Other:pt is tearful throughout the visit      3/10/25 PROMIS-10 Scores: Physical: 15 Mental: 8    PROMIS Raw Score   WNL Symptoms /Impairment     Mild Moderate Severe   Global Physical Health 15-20 13-14

## 2025-03-24 ENCOUNTER — OFFICE VISIT (OUTPATIENT)
Age: 70
End: 2025-03-24
Payer: MEDICARE

## 2025-03-24 DIAGNOSIS — F43.0 STRESS REACTION: Primary | ICD-10-CM

## 2025-03-24 PROCEDURE — 90832 PSYTX W PT 30 MINUTES: CPT | Performed by: PSYCHOLOGIST

## 2025-03-24 PROCEDURE — 1123F ACP DISCUSS/DSCN MKR DOCD: CPT | Performed by: PSYCHOLOGIST

## 2025-03-24 ASSESSMENT — PROMIS GLOBAL HEALTH SCALE
IN GENERAL, HOW WOULD YOU RATE YOUR SATISFACTION WITH YOUR SOCIAL ACTIVITIES AND RELATIONSHIPS [ON A SCALE OF 1 (POOR) TO 5 (EXCELLENT)]?: GOOD
IN GENERAL, PLEASE RATE HOW WELL YOU CARRY OUT YOUR USUAL SOCIAL ACTIVITIES (INCLUDES ACTIVITIES AT HOME, AT WORK, AND IN YOUR COMMUNITY, AND RESPONSIBILITIES AS A PARENT, CHILD, SPOUSE, EMPLOYEE, FRIEND, ETC) [ON A SCALE OF 1 (POOR) TO 5 (EXCELLENT)]?: VERY GOOD
IN THE PAST 7 DAYS, HOW WOULD YOU RATE YOUR FATIGUE ON AVERAGE [ON A SCALE FROM 1 (NONE) TO 5 (VERY SEVERE)]?: MODERATE
IN GENERAL, HOW WOULD YOU RATE YOUR MENTAL HEALTH, INCLUDING YOUR MOOD AND YOUR ABILITY TO THINK [ON A SCALE OF 1 (POOR) TO 5 (EXCELLENT)]?: FAIR
TO WHAT EXTENT ARE YOU ABLE TO CARRY OUT YOUR EVERYDAY PHYSICAL ACTIVITIES SUCH AS WALKING, CLIMBING STAIRS, CARRYING GROCERIES, OR MOVING A CHAIR [ON A SCALE OF 1 (NOT AT ALL) TO 5 (COMPLETELY)]?: COMPLETELY
IN GENERAL, WOULD YOU SAY YOUR HEALTH IS...[ON A SCALE OF 1 (POOR) TO 5 (EXCELLENT)]: GOOD
SUM OF RESPONSES TO QUESTIONS 3, 6, 7, & 8: 14
IN GENERAL, WOULD YOU SAY YOUR QUALITY OF LIFE IS...[ON A SCALE OF 1 (POOR) TO 5 (EXCELLENT)]: FAIR
IN THE PAST 7 DAYS, HOW OFTEN HAVE YOU BEEN BOTHERED BY EMOTIONAL PROBLEMS, SUCH AS FEELING ANXIOUS, DEPRESSED, OR IRRITABLE [ON A SCALE FROM 1 (NEVER) TO 5 (ALWAYS)]?: OFTEN
IN THE PAST 7 DAYS, HOW WOULD YOU RATE YOUR PAIN ON AVERAGE [ON A SCALE FROM 0 (NO PAIN) TO 10 (WORST IMAGINABLE PAIN)]?: 4
SUM OF RESPONSES TO QUESTIONS 2, 4, 5, & 10: 9
IN GENERAL, HOW WOULD YOU RATE YOUR PHYSICAL HEALTH [ON A SCALE OF 1 (POOR) TO 5 (EXCELLENT)]?: FAIR

## 2025-03-24 NOTE — PROGRESS NOTES
Behavioral Health Consultation   Crystal Ballard, PhD  Clinical Psychologist  3/24/2025      Time spent with Patient: 33 minutes 11:03 -11:36  This is patient's 6th  [facility] appointment.    Reason for Consult:   Chief Complaint   Patient presents with    Anxiety    Stress       Referring Provider: Rakesh Stewart MD    Subjective  PT Improvement Questions  How is (target problem) going for you? Same, better, or worse? same    What specifically has changed (if anything)?  Sole, Austin and Candelario all working at [employer].  Juliana's back issue remains unresolved - set 5/1 as date to leave for everyone. Issues with Anitha persist. Anitha's 6th birthday was Thursday she saw her dad, Ricky continues to be very hard on her.     Has anyone else noticed any change(s)?  If so, what? N/a    What do you think is causing the change or keeping it from getting worse?  Will be taking respite for a few days - checking into a hotel    Plan Implementation Questions    Recommendations to Patient:   1. Name her feeling, redirect the expression   2. Continue to reassure her she is loved  3. [facility] [phone], [facility] [phone]      What part were you able to do? All of it - will be calling #3 back after Anitha'a annual physical on Thursday  Did it help/what were the results?  Yes    FUNCTIONAL ANALYSIS (if applicable)     Risk Assessment: Patient is judged to be at  low risk for harm to self/others due to  no current thoughts/reports of suicide and homicide.     Objective  Level of distress: Low  Orientation: Person, Place, Time, and Situation  Appearance: Well-groomed, Awake, Alert, and Good eye contact  Other: mood is euthymic      3/24/25 PROMIS-10 Scores: Physical: 14 Mental: 9    PROMIS Raw Score   WNL Symptoms /Impairment     Mild Moderate Severe   Global Physical Health 15-20 13-14 9-12 4-8   Global Mental Health 14-20 11-13 7-10 4-6     Assessment and Plan  Stress reaction. Pt reports ongoing stressors with her family,

## 2025-04-14 ENCOUNTER — OFFICE VISIT (OUTPATIENT)
Age: 70
End: 2025-04-14
Payer: MEDICARE

## 2025-04-14 DIAGNOSIS — F43.0 STRESS REACTION: Primary | ICD-10-CM

## 2025-04-14 PROCEDURE — 90832 PSYTX W PT 30 MINUTES: CPT | Performed by: PSYCHOLOGIST

## 2025-04-14 PROCEDURE — 1123F ACP DISCUSS/DSCN MKR DOCD: CPT | Performed by: PSYCHOLOGIST

## 2025-04-14 ASSESSMENT — PROMIS GLOBAL HEALTH SCALE
SUM OF RESPONSES TO QUESTIONS 2, 4, 5, & 10: 9
IN THE PAST 7 DAYS, HOW OFTEN HAVE YOU BEEN BOTHERED BY EMOTIONAL PROBLEMS, SUCH AS FEELING ANXIOUS, DEPRESSED, OR IRRITABLE [ON A SCALE FROM 1 (NEVER) TO 5 (ALWAYS)]?: OFTEN
IN GENERAL, WOULD YOU SAY YOUR HEALTH IS...[ON A SCALE OF 1 (POOR) TO 5 (EXCELLENT)]: FAIR
IN THE PAST 7 DAYS, HOW WOULD YOU RATE YOUR PAIN ON AVERAGE [ON A SCALE FROM 0 (NO PAIN) TO 10 (WORST IMAGINABLE PAIN)]?: 4
IN GENERAL, WOULD YOU SAY YOUR QUALITY OF LIFE IS...[ON A SCALE OF 1 (POOR) TO 5 (EXCELLENT)]: FAIR
IN GENERAL, PLEASE RATE HOW WELL YOU CARRY OUT YOUR USUAL SOCIAL ACTIVITIES (INCLUDES ACTIVITIES AT HOME, AT WORK, AND IN YOUR COMMUNITY, AND RESPONSIBILITIES AS A PARENT, CHILD, SPOUSE, EMPLOYEE, FRIEND, ETC) [ON A SCALE OF 1 (POOR) TO 5 (EXCELLENT)]?: GOOD
IN GENERAL, HOW WOULD YOU RATE YOUR SATISFACTION WITH YOUR SOCIAL ACTIVITIES AND RELATIONSHIPS [ON A SCALE OF 1 (POOR) TO 5 (EXCELLENT)]?: GOOD
IN THE PAST 7 DAYS, HOW WOULD YOU RATE YOUR FATIGUE ON AVERAGE [ON A SCALE FROM 1 (NONE) TO 5 (VERY SEVERE)]?: MODERATE
TO WHAT EXTENT ARE YOU ABLE TO CARRY OUT YOUR EVERYDAY PHYSICAL ACTIVITIES SUCH AS WALKING, CLIMBING STAIRS, CARRYING GROCERIES, OR MOVING A CHAIR [ON A SCALE OF 1 (NOT AT ALL) TO 5 (COMPLETELY)]?: COMPLETELY
IN GENERAL, HOW WOULD YOU RATE YOUR PHYSICAL HEALTH [ON A SCALE OF 1 (POOR) TO 5 (EXCELLENT)]?: FAIR
SUM OF RESPONSES TO QUESTIONS 3, 6, 7, & 8: 14
IN GENERAL, HOW WOULD YOU RATE YOUR MENTAL HEALTH, INCLUDING YOUR MOOD AND YOUR ABILITY TO THINK [ON A SCALE OF 1 (POOR) TO 5 (EXCELLENT)]?: FAIR

## 2025-04-14 NOTE — PROGRESS NOTES
Behavioral Health Consultation   Crystal Ballard, PhD  Clinical Psychologist  4/14/2025      Time spent with Patient: 30 minutes 11:30 -12:00  This is patient's 7th  [facility] appointment.    Reason for Consult:   Chief Complaint   Patient presents with    Anxiety    Stress       Referring Provider: Rakesh Stewart MD    Subjective  PT Improvement Questions  How is (target problem) going for you? Same, better, or worse? worse    What specifically has changed (if anything)?  Gagan, Sole, Austin, Ricky and Santa are moving out by next Friday. Other daughter, her son and her  (Matt Piedra (13)  and Andrea) are moving in Thursday.   Andrea has significant health issues is in inpatient rehab for the next few months - shattered his leg can't weight bear at all, other leg is still healing - he has had 5 surgeries since December - heart and legs  Goal is for Dorothea and her family to stay - she will be taking care of Anitha when she is unable to do so. Anitha is doing better - activities outside of school - swim classes twice a week, weekly clubhouse class, gymnastics. Vira fell and was at [facility]    Has anyone else noticed any change(s)?  If so, what? N/a    What do you think is causing the change or keeping it from getting worse?  Spending more time with Anitha is helping , she is juggling demands of many family members. Hard to find time fro herself    Plan Implementation Questions    Recommendations to Patient:   1. Schedule with [facility] after  PCP appt for Anitha       What part were you able to do? None of it  Did it help/what were the results?  n/a    FUNCTIONAL ANALYSIS (if applicable)     Risk Assessment: Patient is judged to be at  low risk for harm to self/others due to  no current thoughts/reports of suicide and homicide.     Objective  Level of distress: Medium  Orientation: Person, Place, Time, and Situation  Appearance: Well-groomed, Awake, Alert, and Good eye contact  Other: mood is euthymic      4/14/25

## 2025-05-04 ENCOUNTER — HOSPITAL ENCOUNTER (EMERGENCY)
Age: 70
Discharge: HOME | End: 2025-05-04
Attending: STUDENT IN AN ORGANIZED HEALTH CARE EDUCATION/TRAINING PROGRAM
Payer: MEDICARE

## 2025-05-04 ENCOUNTER — APPOINTMENT (OUTPATIENT)
Dept: GENERAL RADIOLOGY | Age: 70
End: 2025-05-04
Payer: MEDICARE

## 2025-05-04 VITALS
HEIGHT: 63 IN | WEIGHT: 108 LBS | SYSTOLIC BLOOD PRESSURE: 178 MMHG | TEMPERATURE: 98.3 F | OXYGEN SATURATION: 100 % | RESPIRATION RATE: 18 BRPM | BODY MASS INDEX: 19.14 KG/M2 | DIASTOLIC BLOOD PRESSURE: 74 MMHG | HEART RATE: 61 BPM

## 2025-05-04 DIAGNOSIS — J06.9 VIRAL URI WITH COUGH: Primary | ICD-10-CM

## 2025-05-04 PROCEDURE — 6370000000 HC RX 637 (ALT 250 FOR IP): Performed by: STUDENT IN AN ORGANIZED HEALTH CARE EDUCATION/TRAINING PROGRAM

## 2025-05-04 PROCEDURE — 99283 EMERGENCY DEPT VISIT LOW MDM: CPT

## 2025-05-04 PROCEDURE — 71046 X-RAY EXAM CHEST 2 VIEWS: CPT

## 2025-05-04 RX ORDER — BENZONATATE 100 MG/1
100 CAPSULE ORAL ONCE
Status: COMPLETED | OUTPATIENT
Start: 2025-05-04 | End: 2025-05-04

## 2025-05-04 RX ORDER — OXYMETAZOLINE HYDROCHLORIDE 0.05 G/100ML
2 SPRAY NASAL 2 TIMES DAILY
Qty: 2 ML | Refills: 0 | Status: SHIPPED | OUTPATIENT
Start: 2025-05-04 | End: 2025-05-07

## 2025-05-04 RX ORDER — OXYMETAZOLINE HYDROCHLORIDE 0.05 G/100ML
2 SPRAY NASAL ONCE
Status: COMPLETED | OUTPATIENT
Start: 2025-05-04 | End: 2025-05-04

## 2025-05-04 RX ORDER — BENZONATATE 100 MG/1
100 CAPSULE ORAL 3 TIMES DAILY PRN
Qty: 30 CAPSULE | Refills: 0 | Status: SHIPPED | OUTPATIENT
Start: 2025-05-04 | End: 2025-05-14

## 2025-05-04 RX ADMIN — OXYMETAZOLINE HYDROCHLORIDE 2 SPRAY: 0.5 SPRAY NASAL at 15:16

## 2025-05-04 RX ADMIN — BENZONATATE 100 MG: 100 CAPSULE ORAL at 15:16

## 2025-05-04 ASSESSMENT — LIFESTYLE VARIABLES
HOW MANY STANDARD DRINKS CONTAINING ALCOHOL DO YOU HAVE ON A TYPICAL DAY: PATIENT DOES NOT DRINK
HOW OFTEN DO YOU HAVE A DRINK CONTAINING ALCOHOL: NEVER

## 2025-05-04 ASSESSMENT — PAIN - FUNCTIONAL ASSESSMENT: PAIN_FUNCTIONAL_ASSESSMENT: 0-10

## 2025-05-04 ASSESSMENT — PAIN DESCRIPTION - LOCATION: LOCATION: EAR

## 2025-05-04 ASSESSMENT — PAIN SCALES - GENERAL: PAINLEVEL_OUTOF10: 7

## 2025-05-04 ASSESSMENT — PAIN DESCRIPTION - DESCRIPTORS: DESCRIPTORS: SHARP

## 2025-05-04 ASSESSMENT — PAIN DESCRIPTION - ORIENTATION: ORIENTATION: RIGHT;LEFT

## 2025-05-04 NOTE — ED PROVIDER NOTES
THE [facility]  EMERGENCY DEPARTMENT ENCOUNTER          ATTENDING PHYSICIAN NOTE       Date of evaluation: 5/4/2025    Chief Complaint     Cough (Patient presents with cough, bilateral ear pain and nasal congestion started couple of days ago)      History of Present Illness     Myranda Quiroga is a 70 y.o. female who presents to the emergency department for nasal congestion, cough, and ear congestion/pressure.  Patient has a 10-month-old grandchild who was diagnosed with RSV and admitted to the hospital 6 days ago.  Patient reports that about the same time she then developed a cough and is now slowly progressed to where the cough is constant and does have some production intermittently of greenish colored sputum.  In addition to this she has significant nasal congestion and sinus pressure and congestion and a sensation of ear fullness and ear pressure.  Denies fevers or chills.  Has no sore throat and has been eating and drinking without difficulty.  Patient has a history of COPD but does not take any inhalers other than rescue inhaler which she has not had to use for several years.    ASSESSMENT / PLAN  (MEDICAL DECISION MAKING)     INITIAL VITALS: BP: (!) 178/74, Temp: 98.3 °F (36.8 °C), Pulse: 61, Respirations: 18, SpO2: 100 %      Myranda Quiroga is a 70 y.o. female with past medical history of COPD and smoking and asplenia who presents emergency department after known feeling member exposure to RSV with URI symptoms and a cough.  Patient reports 1 week of cough intermittently productive of greenish colored sputum that has not been improving by despite taking Robitussin, nasal congestion, sinus congestion and pressure, and ear pressure and pain concerning for ear infection.  Patient reports no fever and her vital signs are notable for excellent oxygen saturation and no increased work of breathing.  Her cough was observed in the emergency department and is consistent with upper respiratory infection  HISTORY OF PRESENT ILLNESS:  Francisco Campbell is a 66 year old male who comes in today for follow-up on his new diagnosis of hypertension.  I started him on benazepril 10 mg daily and he reports that overall he is tolerating this well.  States a few times he's had a mild headache after taking this which goes away within 30 min, but not every time, so he is unsure if this is related.    He does state that he is no longer waking up with sweats at night.  Has cut back to 2 cups of coffee and drinks water the rest of his day.    He is exercising regularly and his exercise tolerance is good and unchanged.    Quit smoking about 9 years ago, no new supplements.  He is adherent to a low-salt diet.    Blood pressure has improved at home, but still elevated.  His readings are averaging upper mid-low 140's/upper 70's- mid 80's.  Cardiac symptoms:  none.    He does report that he snores and at times does wake his wife up.      Patient denies chest pain, chest pressure, claudication, dyspnea, exertional chest pressure/discomfort, fatigue, irregular heart beat, lower extremity edema -  Bilateral, near-syncope, palpitations, tachypnea.  Cardiovascular risk factors:  family history, age, and lipids.     Patient is otherwise per his usual state of health and has no additional concerns to report today.      -Negative COVID Universal Screen.      Patient Active Problem List   Diagnosis   • Hypercholesterolemia   • Healthcare maintenance   • Elevated blood-pressure reading without diagnosis of hypertension       No outpatient medications have been marked as taking for the 12/16/21 encounter (Office Visit) with Cari Fernández PA-C.       ALLERGIES:  No Known Allergies      Alcohol Use: Yes           .6 oz/week       Comment: 5 beers per week      Social History     Tobacco Use   Smoking Status Former Smoker   • Packs/day: 0.00   • Types: Cigarettes   Smokeless Tobacco Never Used       Current Outpatient Medications   Medication Sig  Dispense Refill   • benazepril (LOTENSIN) 10 MG tablet Take 1 tablet by mouth daily. 30 tablet 1   • Multiple Vitamins-Minerals (MULTIVITAMIN PO)      • Omega-3 Fatty Acids (FISH OIL) 1000 MG CAPSULE DELAYED RELEASE        No current facility-administered medications for this visit.       ALLERGIES:  No Known Allergies    Past Medical History:   Diagnosis Date   • No known problems        REVIEW OF SYSTEMS  GENERAL: denies fever, chills, fatigue and weight loss.   SKIN: denies  rash, dry skin, scaling, itching, bruising and lumps or bumps  EYES: denies  visual blurring, double vision, eye pain, discharge from the eyes and itching in the eyes  EARS: denies  ringing or tinnitus in the ears, discharge from the ears and vertigo  NOSE: denies  nose bleed, discharge from the nose, sneezing, rhinorrhea, post nasal drainage and sinus pressure/pain  MOUTH: denies  soreness of the mouth or tongue, lumps or bumps in the mouth, abnormality of the teeth or gums and hoarse voice, ADMITS TO SNORING  NECK: denies  swelling or masses in the neck, stiffness in the neck, limited range of motion in the neck, goiter, pain in the neck, dysphagia and reflux   CARDIORESP: denies  chest pain, palpitations, fast heart rate, edema, hemoptysis, wheeze, shortness of breath, sputum, PND, orthopnea and claudication  GI: denies  abdominal pain, nausea, vomiting, constipation, diarrhea, black tarry stools, blood in the stools and change in bowel habits  VASCULAR: denies  claudication with walking and discoloration of the feet  NEURO: denies weakness, numbness, loss of sensation, speech disturbance, trouble with balance or coordination, loss of memory and confusion  PSYCH: denies  symptoms of depression, feeling sad or blue, insomnia, anxiety and lack of motivation  MUSCULOSKEL: denies  joint pain, joint swelling and stiff neck       PHYSICAL EXAMINATION:  Visit Vitals  /74 (BP Location: RUE - Right upper extremity, Patient Position: Sitting,  Cuff Size: Large Adult)       General:  Alert, cooperative, conversive. No acute distress.  Skin:  warm, dry no evidence of rash or other lesions  EENT:  The sclerae and conjunctivae are normal bilaterally.    Neck:   The trachea is midline.  No cervical or supraclavicular lymphadenopathy.  No thyroid mass or thyromegaly.   Respiratory:   Bilaterally clear to auscultation   Non-labored respirations.  Normal respiratory effort.  Cardiovascular: regular rate and rhythm, no murmurs and no clicks or gallops  Extremities:   No edema.  No deformity.  No tenderness.  Neuro:   Alert, oriented x4.  Speech intact.  No dysphasia or dysarthria.  Psych:   Affect is  appropriate with normal social interaction. speech is of normal affect and speed    ASSESSMENT:  1. Hypertension, unspecified type    2. Loud snoring          PLAN:  I took his readings today on both arms and am happy with the results.  I will have him continue to monitor another week and follow up with me for another recheck next week with all his readings.    I have recommended that we bring his cuff in so I can compare the readings with mine.    Will plan to get a BMP at that appointment as well.    Recommended DASH diet and daily exercise as tolerated.  Discussed a goal BP of <140/90, will target 130/80 and see how he tolerates a lower pressure.   I am recommending that he discuss a sleep study with his new PCP to rule out sleep apnea as a contributing problem.      Will plan to see him back next week for follow up.    Patient given education materials.   The patient indicated understanding of the diagnosis and agreed with the plan of care.    No orders of the defined types were placed in this encounter.      Cari Fernández PA-C  Supervising Physician: Jessica Greene DO

## 2025-05-04 NOTE — DISCHARGE INSTRUCTIONS
You were seen in the emergency department for a cough.  Your chest x-ray does not show pneumonia.  You can take Tessalon Perles to help with your cough up to 3 times a day as needed.  I also want you to use this nasal spray to help with your nasal congestion.  You can also take Sudafed over-the-counter to help treat your cough as well.    Return the emergency department if you develop fevers, cough that is productive of more sputum, difficulty breathing, inability tolerate food or water or any other concerning symptoms

## 2025-05-05 ENCOUNTER — OFFICE VISIT (OUTPATIENT)
Age: 70
End: 2025-05-05

## 2025-05-05 DIAGNOSIS — F43.0 STRESS REACTION: Primary | ICD-10-CM

## 2025-05-05 ASSESSMENT — PROMIS GLOBAL HEALTH SCALE
SUM OF RESPONSES TO QUESTIONS 3, 6, 7, & 8: 14
SUM OF RESPONSES TO QUESTIONS 2, 4, 5, & 10: 10
IN GENERAL, PLEASE RATE HOW WELL YOU CARRY OUT YOUR USUAL SOCIAL ACTIVITIES (INCLUDES ACTIVITIES AT HOME, AT WORK, AND IN YOUR COMMUNITY, AND RESPONSIBILITIES AS A PARENT, CHILD, SPOUSE, EMPLOYEE, FRIEND, ETC) [ON A SCALE OF 1 (POOR) TO 5 (EXCELLENT)]?: VERY GOOD
TO WHAT EXTENT ARE YOU ABLE TO CARRY OUT YOUR EVERYDAY PHYSICAL ACTIVITIES SUCH AS WALKING, CLIMBING STAIRS, CARRYING GROCERIES, OR MOVING A CHAIR [ON A SCALE OF 1 (NOT AT ALL) TO 5 (COMPLETELY)]?: COMPLETELY
IN GENERAL, WOULD YOU SAY YOUR HEALTH IS...[ON A SCALE OF 1 (POOR) TO 5 (EXCELLENT)]: FAIR
IN GENERAL, HOW WOULD YOU RATE YOUR SATISFACTION WITH YOUR SOCIAL ACTIVITIES AND RELATIONSHIPS [ON A SCALE OF 1 (POOR) TO 5 (EXCELLENT)]?: GOOD
IN GENERAL, HOW WOULD YOU RATE YOUR PHYSICAL HEALTH [ON A SCALE OF 1 (POOR) TO 5 (EXCELLENT)]?: POOR
IN GENERAL, WOULD YOU SAY YOUR QUALITY OF LIFE IS...[ON A SCALE OF 1 (POOR) TO 5 (EXCELLENT)]: FAIR
IN GENERAL, HOW WOULD YOU RATE YOUR MENTAL HEALTH, INCLUDING YOUR MOOD AND YOUR ABILITY TO THINK [ON A SCALE OF 1 (POOR) TO 5 (EXCELLENT)]?: GOOD
IN THE PAST 7 DAYS, HOW OFTEN HAVE YOU BEEN BOTHERED BY EMOTIONAL PROBLEMS, SUCH AS FEELING ANXIOUS, DEPRESSED, OR IRRITABLE [ON A SCALE FROM 1 (NEVER) TO 5 (ALWAYS)]?: OFTEN
IN THE PAST 7 DAYS, HOW WOULD YOU RATE YOUR FATIGUE ON AVERAGE [ON A SCALE FROM 1 (NONE) TO 5 (VERY SEVERE)]?: MODERATE
IN THE PAST 7 DAYS, HOW WOULD YOU RATE YOUR PAIN ON AVERAGE [ON A SCALE FROM 0 (NO PAIN) TO 10 (WORST IMAGINABLE PAIN)]?: 5

## 2025-05-05 NOTE — PROGRESS NOTES
Stressors have significantly reduced as a result. Her great grand daughter is doing well, is in classes and thriving. Visit spent discussing recent events, her mood is bright and she is future focused.     Strengths: pt is articulate      Recommendations to Patient:   1. Continue with current coping strategies - time with Anitha,    Recommendations to PCP:   1. Reinforce recommendations to pt.    Follow-up plan: to see in 6 weeks    Referrals: None    Crystal Ballard, PhD     This note was generated completely or in part utilizing Dragon dictation speech recognition software.  Occasionally, words are mistranscribed and despite editing, the text may contain inaccuracies due to incorrect word recognition.  If further clarification is needed please contact the office at [phone]

## 2025-05-06 ENCOUNTER — CARE COORDINATION (OUTPATIENT)
Dept: CARE COORDINATION | Age: 70
End: 2025-05-06

## 2025-05-06 NOTE — CARE COORDINATION
Ambulatory Care Coordination Note     5/6/2025 10:55 AM     Patient outreach attempt by this ACM today to offer care management services. ACM was unable to reach the patient by telephone today;   left voice message requesting a return phone call to this ACM.     ACM: Arlyn Tran RN     Care Summary Note:     PCP/Specialist follow up:   Future Appointments         Provider Specialty Dept Phone    6/9/2025 11:00 AM Crystal Ballard, PhD Psychology [phone]            Follow Up:   Plan for next ACM outreach in approximately 1-2 days  to complete:  - outreach attempt to offer care management services.

## 2025-05-08 ENCOUNTER — CARE COORDINATION (OUTPATIENT)
Dept: CARE COORDINATION | Age: 70
End: 2025-05-08

## 2025-05-08 NOTE — CARE COORDINATION
Ambulatory Care Coordination Note     5/8/2025 11:40 AM     Patient outreach attempt by this ACM today to offer care management services. ACM was unable to reach the patient by telephone today;   left voice message requesting a return phone call to this ACM.     ACM: Arlyn Tran RN     Care Summary Note:     PCP/Specialist follow up:   Future Appointments         Provider Specialty Dept Phone    6/9/2025 11:00 AM Crystal Ballard, PhD Psychology [phone]            Follow Up:   Plan for next ACM outreach in approximately 1 week to complete:  - outreach attempt to offer care management services.

## 2025-05-15 ENCOUNTER — CARE COORDINATION (OUTPATIENT)
Dept: CARE COORDINATION | Age: 70
End: 2025-05-15

## 2025-05-15 NOTE — CARE COORDINATION
Ambulatory Care Coordination Note     5/15/2025 9:13 AM     patient outreach attempt by this ACM today to offer care management services. ACM was unable to reach the patient by telephone today;   left voice message requesting a return phone call to this ACM.     Patient closed (unable to reach patient) from the High Risk Care Management program on 5/15/2025.No further Ambulatory Care Manager follow up scheduled.

## 2025-06-09 ENCOUNTER — OFFICE VISIT (OUTPATIENT)
Age: 70
End: 2025-06-09
Payer: MEDICARE

## 2025-06-09 DIAGNOSIS — F43.0 STRESS REACTION: Primary | ICD-10-CM

## 2025-06-09 PROCEDURE — 1123F ACP DISCUSS/DSCN MKR DOCD: CPT | Performed by: PSYCHOLOGIST

## 2025-06-09 PROCEDURE — 90832 PSYTX W PT 30 MINUTES: CPT | Performed by: PSYCHOLOGIST

## 2025-06-09 ASSESSMENT — PROMIS GLOBAL HEALTH SCALE
IN THE PAST 7 DAYS, HOW WOULD YOU RATE YOUR FATIGUE ON AVERAGE [ON A SCALE FROM 1 (NONE) TO 5 (VERY SEVERE)]?: MODERATE
TO WHAT EXTENT ARE YOU ABLE TO CARRY OUT YOUR EVERYDAY PHYSICAL ACTIVITIES SUCH AS WALKING, CLIMBING STAIRS, CARRYING GROCERIES, OR MOVING A CHAIR [ON A SCALE OF 1 (NOT AT ALL) TO 5 (COMPLETELY)]?: COMPLETELY
IN THE PAST 7 DAYS, HOW WOULD YOU RATE YOUR PAIN ON AVERAGE [ON A SCALE FROM 0 (NO PAIN) TO 10 (WORST IMAGINABLE PAIN)]?: 4
IN GENERAL, WOULD YOU SAY YOUR QUALITY OF LIFE IS...[ON A SCALE OF 1 (POOR) TO 5 (EXCELLENT)]: FAIR
SUM OF RESPONSES TO QUESTIONS 2, 4, 5, & 10: 10
IN GENERAL, HOW WOULD YOU RATE YOUR PHYSICAL HEALTH [ON A SCALE OF 1 (POOR) TO 5 (EXCELLENT)]?: GOOD
IN GENERAL, PLEASE RATE HOW WELL YOU CARRY OUT YOUR USUAL SOCIAL ACTIVITIES (INCLUDES ACTIVITIES AT HOME, AT WORK, AND IN YOUR COMMUNITY, AND RESPONSIBILITIES AS A PARENT, CHILD, SPOUSE, EMPLOYEE, FRIEND, ETC) [ON A SCALE OF 1 (POOR) TO 5 (EXCELLENT)]?: VERY GOOD
SUM OF RESPONSES TO QUESTIONS 3, 6, 7, & 8: 15
IN GENERAL, HOW WOULD YOU RATE YOUR SATISFACTION WITH YOUR SOCIAL ACTIVITIES AND RELATIONSHIPS [ON A SCALE OF 1 (POOR) TO 5 (EXCELLENT)]?: VERY GOOD
IN GENERAL, HOW WOULD YOU RATE YOUR MENTAL HEALTH, INCLUDING YOUR MOOD AND YOUR ABILITY TO THINK [ON A SCALE OF 1 (POOR) TO 5 (EXCELLENT)]?: FAIR
IN THE PAST 7 DAYS, HOW OFTEN HAVE YOU BEEN BOTHERED BY EMOTIONAL PROBLEMS, SUCH AS FEELING ANXIOUS, DEPRESSED, OR IRRITABLE [ON A SCALE FROM 1 (NEVER) TO 5 (ALWAYS)]?: OFTEN
IN GENERAL, WOULD YOU SAY YOUR HEALTH IS...[ON A SCALE OF 1 (POOR) TO 5 (EXCELLENT)]: FAIR
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
## 2025-06-12 ENCOUNTER — OFFICE VISIT (OUTPATIENT)
Dept: INTERNAL MEDICINE CLINIC | Age: 70
End: 2025-06-12
Payer: MEDICARE

## 2025-06-12 VITALS
SYSTOLIC BLOOD PRESSURE: 138 MMHG | WEIGHT: 106 LBS | HEIGHT: 63 IN | BODY MASS INDEX: 18.78 KG/M2 | DIASTOLIC BLOOD PRESSURE: 74 MMHG

## 2025-06-12 DIAGNOSIS — R22.2 CHEST WALL MASS: ICD-10-CM

## 2025-06-12 DIAGNOSIS — R19.01 RIGHT UPPER QUADRANT ABDOMINAL MASS: Primary | ICD-10-CM

## 2025-06-12 DIAGNOSIS — R63.4 WEIGHT LOSS: ICD-10-CM

## 2025-06-12 PROCEDURE — 1159F MED LIST DOCD IN RCRD: CPT | Performed by: INTERNAL MEDICINE

## 2025-06-12 PROCEDURE — 1123F ACP DISCUSS/DSCN MKR DOCD: CPT | Performed by: INTERNAL MEDICINE

## 2025-06-12 PROCEDURE — 3078F DIAST BP <80 MM HG: CPT | Performed by: INTERNAL MEDICINE

## 2025-06-12 PROCEDURE — 99213 OFFICE O/P EST LOW 20 MIN: CPT | Performed by: INTERNAL MEDICINE

## 2025-06-12 PROCEDURE — 3075F SYST BP GE 130 - 139MM HG: CPT | Performed by: INTERNAL MEDICINE

## 2025-06-12 ASSESSMENT — PATIENT HEALTH QUESTIONNAIRE - PHQ9
SUM OF ALL RESPONSES TO PHQ QUESTIONS 1-9: 0
SUM OF ALL RESPONSES TO PHQ QUESTIONS 1-9: 0
1. LITTLE INTEREST OR PLEASURE IN DOING THINGS: NOT AT ALL
SUM OF ALL RESPONSES TO PHQ QUESTIONS 1-9: 0
2. FEELING DOWN, DEPRESSED OR HOPELESS: NOT AT ALL
SUM OF ALL RESPONSES TO PHQ QUESTIONS 1-9: 0

## 2025-06-12 NOTE — PROGRESS NOTES
mass  Get CT scan and if this is abnormal may need biopsy  - CT ABDOMEN PELVIS W WO CONTRAST Additional Contrast? Oral; Future    2. Chest wall mass  Probably secondary to hematoma rule out lymphadenitis will see what CT of abdomen is first

## 2025-06-23 ENCOUNTER — APPOINTMENT (OUTPATIENT)
Dept: CT IMAGING | Age: 70
End: 2025-06-23
Payer: MEDICARE

## 2025-06-23 ENCOUNTER — HOSPITAL ENCOUNTER (EMERGENCY)
Age: 70
Discharge: HOME | End: 2025-06-23
Attending: EMERGENCY MEDICINE
Payer: MEDICARE

## 2025-06-23 ENCOUNTER — OFFICE VISIT (OUTPATIENT)
Dept: FAMILY MEDICINE CLINIC | Age: 70
End: 2025-06-23
Payer: MEDICARE

## 2025-06-23 VITALS
BODY MASS INDEX: 19 KG/M2 | HEART RATE: 81 BPM | SYSTOLIC BLOOD PRESSURE: 196 MMHG | WEIGHT: 107.2 LBS | TEMPERATURE: 98.8 F | OXYGEN SATURATION: 98 % | DIASTOLIC BLOOD PRESSURE: 104 MMHG | HEIGHT: 63 IN

## 2025-06-23 VITALS
TEMPERATURE: 97.9 F | BODY MASS INDEX: 18.89 KG/M2 | HEART RATE: 64 BPM | OXYGEN SATURATION: 96 % | DIASTOLIC BLOOD PRESSURE: 70 MMHG | SYSTOLIC BLOOD PRESSURE: 159 MMHG | RESPIRATION RATE: 18 BRPM | HEIGHT: 63 IN | WEIGHT: 106.6 LBS

## 2025-06-23 DIAGNOSIS — I10 PRIMARY HYPERTENSION: Primary | ICD-10-CM

## 2025-06-23 DIAGNOSIS — I10 ESSENTIAL HYPERTENSION: ICD-10-CM

## 2025-06-23 DIAGNOSIS — I10 HYPERTENSION, UNSPECIFIED TYPE: Primary | ICD-10-CM

## 2025-06-23 DIAGNOSIS — R51.9 NONINTRACTABLE EPISODIC HEADACHE, UNSPECIFIED HEADACHE TYPE: ICD-10-CM

## 2025-06-23 DIAGNOSIS — R93.89 ABNORMAL CT SCAN: ICD-10-CM

## 2025-06-23 DIAGNOSIS — E53.8 B12 DEFICIENCY: ICD-10-CM

## 2025-06-23 LAB
ALBUMIN SERPL-MCNC: 3.5 G/DL (ref 3.4–5)
ALP SERPL-CCNC: 134 U/L (ref 40–129)
ALT SERPL-CCNC: <5 U/L (ref 10–40)
ANION GAP SERPL CALCULATED.3IONS-SCNC: 11 MMOL/L (ref 3–16)
AST SERPL-CCNC: 18 U/L (ref 15–37)
BASOPHILS # BLD: 0.1 K/UL (ref 0–0.2)
BASOPHILS NFR BLD: 0.7 %
BILIRUB DIRECT SERPL-MCNC: <0.1 MG/DL (ref 0–0.3)
BILIRUB INDIRECT SERPL-MCNC: 0.2 MG/DL (ref 0–1)
BILIRUB SERPL-MCNC: 0.3 MG/DL (ref 0–1)
BUN SERPL-MCNC: 8 MG/DL (ref 7–20)
CALCIUM SERPL-MCNC: 9 MG/DL (ref 8.3–10.6)
CHLORIDE SERPL-SCNC: 106 MMOL/L (ref 99–110)
CO2 SERPL-SCNC: 24 MMOL/L (ref 21–32)
CREAT SERPL-MCNC: 0.6 MG/DL (ref 0.6–1.2)
DEPRECATED RDW RBC AUTO: 16.2 % (ref 12.4–15.4)
EOSINOPHIL # BLD: 0.3 K/UL (ref 0–0.6)
EOSINOPHIL NFR BLD: 2.7 %
GFR SERPLBLD CREATININE-BSD FMLA CKD-EPI: >90 ML/MIN/{1.73_M2}
GLUCOSE SERPL-MCNC: 119 MG/DL (ref 70–99)
HCT VFR BLD AUTO: 37 % (ref 36–48)
HGB BLD-MCNC: 12.1 G/DL (ref 12–16)
LIPASE SERPL-CCNC: 43 U/L (ref 13–60)
LYMPHOCYTES # BLD: 2.7 K/UL (ref 1–5.1)
LYMPHOCYTES NFR BLD: 26.8 %
MCH RBC QN AUTO: 27.3 PG (ref 26–34)
MCHC RBC AUTO-ENTMCNC: 32.6 G/DL (ref 31–36)
MCV RBC AUTO: 83.8 FL (ref 80–100)
MONOCYTES # BLD: 0.9 K/UL (ref 0–1.3)
MONOCYTES NFR BLD: 9.2 %
NEUTROPHILS # BLD: 6 K/UL (ref 1.7–7.7)
NEUTROPHILS NFR BLD: 60.6 %
PLATELET # BLD AUTO: 533 K/UL (ref 135–450)
PMV BLD AUTO: 8.4 FL (ref 5–10.5)
POTASSIUM SERPL-SCNC: 3.7 MMOL/L (ref 3.5–5.1)
PROT SERPL-MCNC: 7.7 G/DL (ref 6.4–8.2)
RBC # BLD AUTO: 4.42 M/UL (ref 4–5.2)
SODIUM SERPL-SCNC: 141 MMOL/L (ref 136–145)
WBC # BLD AUTO: 10 K/UL (ref 4–11)

## 2025-06-23 PROCEDURE — 85025 COMPLETE CBC W/AUTO DIFF WBC: CPT

## 2025-06-23 PROCEDURE — 70498 CT ANGIOGRAPHY NECK: CPT

## 2025-06-23 PROCEDURE — 99285 EMERGENCY DEPT VISIT HI MDM: CPT

## 2025-06-23 PROCEDURE — 1159F MED LIST DOCD IN RCRD: CPT | Performed by: STUDENT IN AN ORGANIZED HEALTH CARE EDUCATION/TRAINING PROGRAM

## 2025-06-23 PROCEDURE — 83690 ASSAY OF LIPASE: CPT

## 2025-06-23 PROCEDURE — 6360000004 HC RX CONTRAST MEDICATION: Performed by: EMERGENCY MEDICINE

## 2025-06-23 PROCEDURE — 3077F SYST BP >= 140 MM HG: CPT | Performed by: STUDENT IN AN ORGANIZED HEALTH CARE EDUCATION/TRAINING PROGRAM

## 2025-06-23 PROCEDURE — 80076 HEPATIC FUNCTION PANEL: CPT

## 2025-06-23 PROCEDURE — 6370000000 HC RX 637 (ALT 250 FOR IP): Performed by: EMERGENCY MEDICINE

## 2025-06-23 PROCEDURE — 99205 OFFICE O/P NEW HI 60 MIN: CPT | Performed by: STUDENT IN AN ORGANIZED HEALTH CARE EDUCATION/TRAINING PROGRAM

## 2025-06-23 PROCEDURE — 3080F DIAST BP >= 90 MM HG: CPT | Performed by: STUDENT IN AN ORGANIZED HEALTH CARE EDUCATION/TRAINING PROGRAM

## 2025-06-23 PROCEDURE — 1123F ACP DISCUSS/DSCN MKR DOCD: CPT | Performed by: STUDENT IN AN ORGANIZED HEALTH CARE EDUCATION/TRAINING PROGRAM

## 2025-06-23 PROCEDURE — 80048 BASIC METABOLIC PNL TOTAL CA: CPT

## 2025-06-23 PROCEDURE — 70450 CT HEAD/BRAIN W/O DYE: CPT

## 2025-06-23 PROCEDURE — 1160F RVW MEDS BY RX/DR IN RCRD: CPT | Performed by: STUDENT IN AN ORGANIZED HEALTH CARE EDUCATION/TRAINING PROGRAM

## 2025-06-23 RX ORDER — ACETAMINOPHEN 500 MG
1000 TABLET ORAL ONCE
Status: COMPLETED | OUTPATIENT
Start: 2025-06-23 | End: 2025-06-23

## 2025-06-23 RX ORDER — TRIAMTERENE AND HYDROCHLOROTHIAZIDE 37.5; 25 MG/1; MG/1
1 TABLET ORAL ONCE
Status: COMPLETED | OUTPATIENT
Start: 2025-06-23 | End: 2025-06-23

## 2025-06-23 RX ORDER — TRIAMTERENE AND HYDROCHLOROTHIAZIDE 37.5; 25 MG/1; MG/1
1 TABLET ORAL DAILY
Status: DISCONTINUED | OUTPATIENT
Start: 2025-06-23 | End: 2025-06-23

## 2025-06-23 RX ORDER — IOPAMIDOL 755 MG/ML
75 INJECTION, SOLUTION INTRAVASCULAR
Status: COMPLETED | OUTPATIENT
Start: 2025-06-23 | End: 2025-06-23

## 2025-06-23 RX ADMIN — TRIAMTERENE AND HYDROCHLOROTHIAZIDE 1 TABLET: 37.5; 25 TABLET ORAL at 15:23

## 2025-06-23 RX ADMIN — IOPAMIDOL 75 ML: 755 INJECTION, SOLUTION INTRAVENOUS at 15:57

## 2025-06-23 RX ADMIN — ACETAMINOPHEN 1000 MG: 500 TABLET ORAL at 15:06

## 2025-06-23 ASSESSMENT — PAIN SCALES - GENERAL
PAINLEVEL_OUTOF10: 4
PAINLEVEL_OUTOF10: 7
PAINLEVEL_OUTOF10: 7

## 2025-06-23 ASSESSMENT — PAIN DESCRIPTION - FREQUENCY: FREQUENCY: CONTINUOUS

## 2025-06-23 ASSESSMENT — PAIN - FUNCTIONAL ASSESSMENT: PAIN_FUNCTIONAL_ASSESSMENT: 0-10

## 2025-06-23 ASSESSMENT — PAIN DESCRIPTION - LOCATION
LOCATION: HEAD
LOCATION: HEAD

## 2025-06-23 ASSESSMENT — PAIN DESCRIPTION - DESCRIPTORS
DESCRIPTORS: PRESSURE
DESCRIPTORS: PRESSURE

## 2025-06-23 ASSESSMENT — PAIN DESCRIPTION - ORIENTATION: ORIENTATION: RIGHT

## 2025-06-23 ASSESSMENT — PAIN DESCRIPTION - PAIN TYPE: TYPE: ACUTE PAIN

## 2025-06-23 NOTE — ED TRIAGE NOTES
Chest pain started early this morning  Body weakness nausea and headache last night Pt reports headache for a few weeks. Pt reports being seen by PCP today and SBP was noted to 186 and PCP recommended to go to ED. Pt reports not taking prescription BP meds yet today. Pt also endorses a nagging cough the last few days as well.

## 2025-06-23 NOTE — PROGRESS NOTES
Chief Complaint   Patient presents with    Follow-up     Patient could not drink the contrast needed for the CT. What is next?          HPI: Myranda Quiroga is a 70 y.o. female who presents for HTN    #HTN  - Blood pressure significantly elevated today, patient is on triamterene/hydrochlorothiazide 37.5/25 mg daily, unable to tolerate p.o. contrast, pt did not take her BP meds today, does have headache currently       Lab Results   Component Value Date    CREATININE 0.7 09/07/2024    BUN 9 09/07/2024     09/07/2024    K 4.1 09/07/2024     09/07/2024    CO2 28 09/07/2024        Past Medical History:   Diagnosis Date    Asthma     Breast cyst 04/11/2014    Dizziness     Dry skin dermatitis 12/15/2022    Eczema     H/O splenectomy 08/27/2012    Headache     Hearing loss     Hypertension     Lumbar disc disease 04/11/2014    Other B-complex deficiencies     Panic attacks     Pneumonia 11/15/2014    Thoracic or lumbosacral neuritis or radiculitis, unspecified     Tinnitus     Vertigo     Vitamin D deficiency 04/28/2015    Wears dentures        Past Surgical History:   Procedure Laterality Date    APPENDECTOMY      ARM SURGERY      Plate in upper right arm    HYSTERECTOMY, TOTAL ABDOMINAL (CERVIX REMOVED)      MYRINGOTOMY Bilateral 06/14/2023    BILATERAL MYRINGOTOMY WITH T-TUBE performed by Angely Witt MD at [facility] OR    SPLENECTOMY      H/O    TYMPANOSTOMY TUBE PLACEMENT        Current Outpatient Medications   Medication Sig Dispense Refill    triamterene-hydroCHLOROthiazide (MAXZIDE-25) 37.5-25 MG per tablet Take 1 tablet by mouth daily NEEDS AN APPOINTMENT 30 tablet 0    pantoprazole (PROTONIX) 40 MG tablet Take 1 tablet by mouth daily (before dinner) 30 tablet 5    vitamin D (ERGOCALCIFEROL) 1.25 MG (52793 UT) CAPS capsule TAKE 1 CAPSULE BY MOUTH 1 TIME A WEEK 12 capsule 0    clotrimazole-betamethasone (LOTRISONE) 1-0.05 % cream Apply topically 2 times daily as needed for Rash PRN

## 2025-06-23 NOTE — ED PROVIDER NOTES
THE [facility]  EMERGENCY DEPARTMENT ENCOUNTER          ATTENDING PHYSICIAN NOTE       Date of evaluation: 6/23/2025      Assessment/ Medical Decion Making     MDM: Myranda Quiroga is a 70 y.o. female with history of hypertension who presents for evaluation of elevated blood pressure and multiple subacute complaints including headache for 2 weeks, cough for 6 months, nausea for about 3 weeks.  While her blood pressure is elevated today she did not take her home antihypertensives this morning so we will start by administering this.  Based on her constellation of symptoms I have ordered CT/CTA of the head as well as labs.  Since she has no chest pain, shortness of breath, peripheral edema do not feel that workup is indicated at this time.  Her headache history is overall benign and she does have migraines once yearly but with more prolonged course, do think imaging is reasonable today.  With regards to her diplopia since she has already been evaluated by an optometrist, does not have any difficulty and no believe this can be followed up as outpatient if CT is reassuring.    She is established with a PCP and is well-appearing and eager for discharge so if the above testing is reassuring do think she will be appropriate for discharge to home.  She was sleeping comfortably on my initial reassessment following acetaminophen and home antihypertensive..         Medical Decision Making  Amount and/or Complexity of Data Reviewed  Labs: ordered.  Radiology: ordered.    Risk  OTC drugs.  Prescription drug management.        Good Sepulveda MD  9:32 AM    Clinical Impression     Headache  Elevated blood pressure    Disposition     DISPOSITION     Signed out to oncoming ED provider    Chief Complaint     Headache and Hypertension      History of Present Illness     Myranda Quiroga is a 70 y.o. female with a PMH inclusive of lumbar disc disease, vertigo, HA, HTN who presents for evaluation of elevated blood pressure. 
   Creatinine 0.6 0.6 - 1.2 mg/dL    Est, Glom Filt Rate >90 >60    Calcium 9.0 8.3 - 10.6 mg/dL   Hepatic Function Panel   Result Value Ref Range    Total Protein 7.7 6.4 - 8.2 g/dL    Albumin 3.5 3.4 - 5.0 g/dL    Alkaline Phosphatase 134 (H) 40 - 129 U/L    ALT <5 (L) 10 - 40 U/L    AST 18 15 - 37 U/L    Total Bilirubin 0.3 0.0 - 1.0 mg/dL    Bilirubin, Direct <0.1 0.0 - 0.3 mg/dL    Bilirubin, Indirect 0.2 0.0 - 1.0 mg/dL   Lipase   Result Value Ref Range    Lipase 43.0 13.0 - 60.0 U/L       MOST RECENT VITALS:  BP: (!) 159/70, Temp: 97.9 °F (36.6 °C), Pulse: 64, Respirations: 18, SpO2: 96 %     ED Course          The patient was given the following medications:  Orders Placed This Encounter   Medications    DISCONTD: triamterene-hydroCHLOROthiazide (MAXZIDE-25) 37.5-25 MG per tablet 1 tablet    acetaminophen (TYLENOL) tablet 1,000 mg    triamterene-hydroCHLOROthiazide (MAXZIDE-25) 37.5-25 MG per tablet 1 tablet    iopamidol (ISOVUE-370) 76 % injection 75 mL       CONSULTS:  None       Candelario Travis MD  06/23/25 0137

## 2025-06-24 ENCOUNTER — HOSPITAL ENCOUNTER (OUTPATIENT)
Age: 70
Discharge: HOME | End: 2025-06-24
Payer: MEDICARE

## 2025-06-24 ENCOUNTER — CARE COORDINATION (OUTPATIENT)
Dept: CARE COORDINATION | Age: 70
End: 2025-06-24

## 2025-06-24 ENCOUNTER — RESULTS FOLLOW-UP (OUTPATIENT)
Dept: FAMILY MEDICINE CLINIC | Age: 70
End: 2025-06-24

## 2025-06-24 DIAGNOSIS — R93.89 ABNORMAL CT SCAN: Primary | ICD-10-CM

## 2025-06-24 DIAGNOSIS — R93.89 ABNORMAL CT SCAN: ICD-10-CM

## 2025-06-24 DIAGNOSIS — J40 BRONCHITIS: Primary | ICD-10-CM

## 2025-06-24 PROCEDURE — 74176 CT ABD & PELVIS W/O CONTRAST: CPT

## 2025-06-24 RX ORDER — PANTOPRAZOLE SODIUM 40 MG/1
40 TABLET, DELAYED RELEASE ORAL
Qty: 30 TABLET | Refills: 5 | OUTPATIENT
Start: 2025-06-24

## 2025-06-24 RX ORDER — CYANOCOBALAMIN 1000 UG/ML
INJECTION, SOLUTION INTRAMUSCULAR; SUBCUTANEOUS
Qty: 10 ML | Refills: 0 | Status: SHIPPED | OUTPATIENT
Start: 2025-06-24

## 2025-06-24 RX ORDER — ERGOCALCIFEROL 1.25 MG/1
50000 CAPSULE, LIQUID FILLED ORAL WEEKLY
Qty: 12 CAPSULE | Refills: 0 | Status: SHIPPED | OUTPATIENT
Start: 2025-06-24

## 2025-06-24 RX ORDER — TRIAMTERENE AND HYDROCHLOROTHIAZIDE 37.5; 25 MG/1; MG/1
1 TABLET ORAL DAILY
Qty: 90 TABLET | Refills: 0 | Status: SHIPPED | OUTPATIENT
Start: 2025-06-24

## 2025-06-24 NOTE — TELEPHONE ENCOUNTER
Last appointment: 6/12/2025  Next appointment: Visit date not found    (6/8/2023) by Fernando Sung MD

## 2025-06-24 NOTE — CARE COORDINATION
Ambulatory Care Coordination Note     6/24/2025 11:59 AM     Patient outreach attempt by this ACM today to offer care management services. ACM was unable to reach the patient by telephone today;   left voice message requesting a return phone call to this ACM. [id number]     ACM: Nena Schafer, RN     Care Summary Note: E/D visit at [facility] on 6/23/25 for HA and HTN. PMHx: HTN, Bronchitis. HRCM program identified at this time, ACM added to Tx team.    PCP/Specialist follow up:   Future Appointments         Provider Specialty Dept Phone    6/24/2025 12:00 PM (Arrive by 11:30 AM) [facility] CT 1 Radiology [phone]    7/7/2025 10:30 AM Crystal Ballard, PhD Psychology [phone]            Follow Up:   Plan for next ACM outreach in approximately 1 week to complete:  - outreach attempt to offer care management services.        Nena ELLIS, RN  Respecting Choices® Advanced Steps ACP Facilitator  Ambulatory Care Manager  [facility]  [phone]

## 2025-06-30 DIAGNOSIS — K80.50 BILIARY COLIC: Primary | ICD-10-CM

## 2025-07-02 ENCOUNTER — CARE COORDINATION (OUTPATIENT)
Dept: CARE COORDINATION | Age: 70
End: 2025-07-02

## 2025-07-02 ENCOUNTER — OFFICE VISIT (OUTPATIENT)
Dept: SURGERY | Age: 70
End: 2025-07-02

## 2025-07-02 VITALS
SYSTOLIC BLOOD PRESSURE: 145 MMHG | OXYGEN SATURATION: 98 % | HEART RATE: 90 BPM | WEIGHT: 104 LBS | BODY MASS INDEX: 18.43 KG/M2 | DIASTOLIC BLOOD PRESSURE: 79 MMHG | HEIGHT: 63 IN | TEMPERATURE: 97.5 F

## 2025-07-02 DIAGNOSIS — K80.20 GALLSTONES: Primary | ICD-10-CM

## 2025-07-02 NOTE — PROGRESS NOTES
Subjective:     Patient is a 70 y.o. woman with gallstones     HPI: Gallstones seen on recent CT. She reports a several month history of nausea with eating and right upper quadrant pain that radiates to back.     Patient Active Problem List    Diagnosis Date Noted    Dry skin dermatitis 12/15/2022    Skin rash 11/16/2022    Bronchitis 10/18/2023    Retraction pocket of tympanic membrane, left 06/14/2023    Fluid level behind tympanic membrane of right ear 06/14/2023    Vertigo 03/29/2023    RBC microcytosis 02/16/2022    Nephrolithiasis     Increased pressure in the eye, right 12/04/2018    Smoker 11/17/2018    Other headache syndrome 11/17/2018    Primary hypertension 07/21/2014    Lumbar disc disease 04/11/2014    Breast cyst 04/11/2014    H/O splenectomy 08/27/2012    B12 deficiency 06/01/2012    Vitamin D deficiency 07/26/2010     Past Medical History:   Diagnosis Date    Asthma     Breast cyst 04/11/2014    Dizziness     Dry skin dermatitis 12/15/2022    Eczema     H/O splenectomy 08/27/2012    Headache     Hearing loss     Hypertension     Lumbar disc disease 04/11/2014    Other B-complex deficiencies     Panic attacks     Pneumonia 11/15/2014    Thoracic or lumbosacral neuritis or radiculitis, unspecified     Tinnitus     Vertigo     Vitamin D deficiency 04/28/2015    Wears dentures       Past Surgical History:   Procedure Laterality Date    APPENDECTOMY      ARM SURGERY      Plate in upper right arm    HYSTERECTOMY, TOTAL ABDOMINAL (CERVIX REMOVED)      MYRINGOTOMY Bilateral 06/14/2023    BILATERAL MYRINGOTOMY WITH T-TUBE performed by Angely Witt MD at [facility] OR    SPLENECTOMY      H/O    TYMPANOSTOMY TUBE PLACEMENT        Not in a hospital admission.  Allergies   Allergen Reactions    Erythromycin Hives    Sulfa Antibiotics Hives      Social History     Tobacco Use    Smoking status: Every Day     Current packs/day: 0.00     Average packs/day: 0.5 packs/day for 30.0 years (15.0 ttl pk-yrs)     Types:

## 2025-07-02 NOTE — CARE COORDINATION
Ambulatory Care Coordination Note     7/2/2025 9:37 AM     Patient outreach attempt by this AC today to offer care management services. AC was unable to reach the patient by telephone today;   left voice message requesting a return phone call to this ACM. Attempt x 2      ACM: Arlyn Tran, RN     Care Summary Note:     PCP/Specialist follow up:   Future Appointments         Provider Specialty Dept Phone    7/2/2025 10:30 AM Abel Webb MD Colon and Rectal Surgery [phone]    7/7/2025 10:30 AM Crystal Ballard, PhD Psychology [phone]            Follow Up:   Plan for next ACM outreach in approximately 1 week to complete:  - outreach attempt to offer care management services.

## 2025-07-03 ENCOUNTER — HOSPITAL ENCOUNTER (OUTPATIENT)
Age: 70
Discharge: HOME | End: 2025-07-03
Attending: SURGERY
Payer: MEDICARE

## 2025-07-03 DIAGNOSIS — K80.20 GALLSTONES: ICD-10-CM

## 2025-07-03 PROCEDURE — 76705 ECHO EXAM OF ABDOMEN: CPT

## 2025-07-07 ENCOUNTER — OFFICE VISIT (OUTPATIENT)
Age: 70
End: 2025-07-07
Payer: MEDICARE

## 2025-07-07 ENCOUNTER — TELEPHONE (OUTPATIENT)
Dept: SURGERY | Age: 70
End: 2025-07-07

## 2025-07-07 DIAGNOSIS — F43.0 STRESS REACTION: Primary | ICD-10-CM

## 2025-07-07 DIAGNOSIS — K80.20 GALLSTONES: Primary | ICD-10-CM

## 2025-07-07 PROCEDURE — 90832 PSYTX W PT 30 MINUTES: CPT | Performed by: PSYCHOLOGIST

## 2025-07-07 PROCEDURE — 1123F ACP DISCUSS/DSCN MKR DOCD: CPT | Performed by: PSYCHOLOGIST

## 2025-07-07 ASSESSMENT — PROMIS GLOBAL HEALTH SCALE
IN GENERAL, WOULD YOU SAY YOUR HEALTH IS...[ON A SCALE OF 1 (POOR) TO 5 (EXCELLENT)]: FAIR
IN THE PAST 7 DAYS, HOW OFTEN HAVE YOU BEEN BOTHERED BY EMOTIONAL PROBLEMS, SUCH AS FEELING ANXIOUS, DEPRESSED, OR IRRITABLE [ON A SCALE FROM 1 (NEVER) TO 5 (ALWAYS)]?: SOMETIMES
IN GENERAL, HOW WOULD YOU RATE YOUR PHYSICAL HEALTH [ON A SCALE OF 1 (POOR) TO 5 (EXCELLENT)]?: FAIR
SUM OF RESPONSES TO QUESTIONS 3, 6, 7, & 8: 13
IN GENERAL, HOW WOULD YOU RATE YOUR SATISFACTION WITH YOUR SOCIAL ACTIVITIES AND RELATIONSHIPS [ON A SCALE OF 1 (POOR) TO 5 (EXCELLENT)]?: GOOD
SUM OF RESPONSES TO QUESTIONS 2, 4, 5, & 10: 11
TO WHAT EXTENT ARE YOU ABLE TO CARRY OUT YOUR EVERYDAY PHYSICAL ACTIVITIES SUCH AS WALKING, CLIMBING STAIRS, CARRYING GROCERIES, OR MOVING A CHAIR [ON A SCALE OF 1 (NOT AT ALL) TO 5 (COMPLETELY)]?: MOSTLY
IN GENERAL, PLEASE RATE HOW WELL YOU CARRY OUT YOUR USUAL SOCIAL ACTIVITIES (INCLUDES ACTIVITIES AT HOME, AT WORK, AND IN YOUR COMMUNITY, AND RESPONSIBILITIES AS A PARENT, CHILD, SPOUSE, EMPLOYEE, FRIEND, ETC) [ON A SCALE OF 1 (POOR) TO 5 (EXCELLENT)]?: VERY GOOD
IN GENERAL, HOW WOULD YOU RATE YOUR MENTAL HEALTH, INCLUDING YOUR MOOD AND YOUR ABILITY TO THINK [ON A SCALE OF 1 (POOR) TO 5 (EXCELLENT)]?: FAIR
IN GENERAL, WOULD YOU SAY YOUR QUALITY OF LIFE IS...[ON A SCALE OF 1 (POOR) TO 5 (EXCELLENT)]: GOOD
IN THE PAST 7 DAYS, HOW WOULD YOU RATE YOUR PAIN ON AVERAGE [ON A SCALE FROM 0 (NO PAIN) TO 10 (WORST IMAGINABLE PAIN)]?: 4
IN THE PAST 7 DAYS, HOW WOULD YOU RATE YOUR FATIGUE ON AVERAGE [ON A SCALE FROM 1 (NONE) TO 5 (VERY SEVERE)]?: MODERATE

## 2025-07-07 NOTE — PROGRESS NOTES
Behavioral Health Consultation   Crystal Ballard, PhD  Clinical Psychologist  7/7/2025      Time spent with Patient: 28 minutes 10:32 -11:00  This is patient's 10th  [facility] appointment.    Reason for Consult:   Chief Complaint   Patient presents with    Stress       Referring Provider: Rakesh Stewart MD    Subjective  PT Improvement Questions  How is (target problem) going for you? Same, better, or worse? same    What specifically has changed (if anything)? Has been ill - gallstones. May be having surgery. Sole is having difficulty scheduling knee surgery Dorothea broke her foot Running everyone around with MD appointments. Santa' MD called to talk about concerns with his care    Has anyone else noticed any change(s)?  If so, what? N/a    What do you think is causing the change or keeping it from getting worse? Everyone is out of the house, Anitha is doing well    Plan Implementation Questions    Recommendations to Patient:   1. Give yourself permission to take time for you - I.e during gymnastics      What part were you able to do? None of it  Did it help/what were the results?  n/a    FUNCTIONAL ANALYSIS (if applicable)     Risk Assessment: Patient is judged to be at  low risk for harm to self/others due to  no current thoughts/reports of suicide and homicide.     Objective  Level of distress: Low  Orientation: Person, Place, Time, and Situation  Appearance: Well-groomed, Awake, Alert, and Good eye contact  Other: mood is euthymic      7/7/25 PROMIS-10 Scores: Physical: 13 Mental: 11    PROMIS Raw Score   WNL Symptoms /Impairment     Mild Moderate Severe   Global Physical Health 15-20 13-14 9-12 4-8   Global Mental Health 14-20 11-13 7-10 4-6     Assessment and Plan  Stress reaction. Pt reports ongoing stressors, her health issues have increased with a recent gallstones dx. She has had some of her adult children leave her home which has been helpful, but she remains active in their care as there are several health

## 2025-07-07 NOTE — TELEPHONE ENCOUNTER
Patient completed Ultrasound 0703/2025 and would like to discuss next steps and possibly schedule surgery.     Patient can be reached at [phone].

## 2025-07-07 NOTE — TELEPHONE ENCOUNTER
[facility] for patient informing her Dr. Webb is out of office and will be back 7/10. She can call the office on 7/10 if she has not heard back from Dr. Webb.

## 2025-07-09 ENCOUNTER — CARE COORDINATION (OUTPATIENT)
Dept: CARE COORDINATION | Age: 70
End: 2025-07-09

## 2025-07-09 NOTE — CARE COORDINATION
Ambulatory Care Coordination Note     7/9/2025 9:06 AM     patient outreach attempt by this ACM today to offer care management services. ACM was unable to reach the patient by telephone today;   left voice message requesting a return phone call to this ACM. Attempt x 3      Patient closed (unable to reach patient) from the High Risk Care Management program on 7/9/2025.  No further Ambulatory Care Manager follow up scheduled.

## 2025-07-10 NOTE — TELEPHONE ENCOUNTER
LMOM to call office  Ultrasound showed gallstones    OK to schedule surgery  Letter complete    Abel Webb M.D.  7/10/25   9:31 AM

## 2025-07-14 ENCOUNTER — TELEPHONE (OUTPATIENT)
Dept: SURGERY | Age: 70
End: 2025-07-14

## 2025-07-14 ENCOUNTER — PATIENT MESSAGE (OUTPATIENT)
Dept: SURGERY | Age: 70
End: 2025-07-14

## 2025-07-14 PROBLEM — K80.20 GALLSTONES: Status: ACTIVE | Noted: 2025-07-10

## 2025-07-14 NOTE — TELEPHONE ENCOUNTER
Patient has been scheduled for:    Procedure: Lap katalina w/ poss IOC  Date: 7/29  Time: 7:30  Arrival: 5:30  Hospital: [facility]    ASA or blood thinning medications?:  no  Any injectable medications for diabetes or weight loss GLP1 (\"tide\" medications)?  no  Any SGLT-2 meds (\"flozin\" meds)? no    Prep? none    Pre-op? PCP    Post-op Appt? 8/13 at  9:00    Patient advised they will need a . Y    Case request sent and prep for proc orders done  Y    Medication sent to Pharmacy: N    Stents or ostomy marking?   N    Instructions have been mailed/emailed to: My Chart    Added to outlook calendar   Y

## 2025-07-23 ENCOUNTER — TELEPHONE (OUTPATIENT)
Dept: SURGERY | Age: 70
End: 2025-07-23

## 2025-07-23 NOTE — TELEPHONE ENCOUNTER
Lvm for patient to call office to reschedule surgery on 7/29/25, per her request due to personal issues.

## 2025-08-13 ENCOUNTER — CARE COORDINATION (OUTPATIENT)
Dept: CARE COORDINATION | Age: 70
End: 2025-08-13

## 2025-08-14 ENCOUNTER — CARE COORDINATION (OUTPATIENT)
Dept: CARE COORDINATION | Age: 70
End: 2025-08-14